# Patient Record
Sex: FEMALE | Race: WHITE | NOT HISPANIC OR LATINO | Employment: OTHER | URBAN - NONMETROPOLITAN AREA
[De-identification: names, ages, dates, MRNs, and addresses within clinical notes are randomized per-mention and may not be internally consistent; named-entity substitution may affect disease eponyms.]

---

## 2017-01-20 DIAGNOSIS — Z12.31 ENCOUNTER FOR SCREENING MAMMOGRAM FOR MALIGNANT NEOPLASM OF BREAST: Primary | ICD-10-CM

## 2017-02-02 ENCOUNTER — LAB (OUTPATIENT)
Dept: LAB | Facility: OTHER | Age: 62
End: 2017-02-02

## 2017-02-02 DIAGNOSIS — I10 ESSENTIAL HYPERTENSION: ICD-10-CM

## 2017-02-02 DIAGNOSIS — R73.01 IMPAIRED FASTING GLUCOSE: ICD-10-CM

## 2017-02-02 DIAGNOSIS — I10 ESSENTIAL HYPERTENSION: Primary | ICD-10-CM

## 2017-02-02 DIAGNOSIS — E78.5 HYPERLIPIDEMIA, UNSPECIFIED HYPERLIPIDEMIA TYPE: ICD-10-CM

## 2017-02-02 LAB
ALBUMIN SERPL-MCNC: 4.2 G/DL (ref 3.2–5.5)
ALBUMIN/GLOB SERPL: 1.9 G/DL (ref 1–3)
ALP SERPL-CCNC: 68 U/L (ref 15–121)
ALT SERPL W P-5'-P-CCNC: 12 U/L (ref 10–60)
ANION GAP SERPL CALCULATED.3IONS-SCNC: 4 MMOL/L (ref 5–15)
ARTICHOKE IGE QN: 85 MG/DL (ref 0–129)
AST SERPL-CCNC: 16 U/L (ref 10–60)
BASOPHILS # BLD AUTO: 0.02 10*3/MM3 (ref 0–0.2)
BASOPHILS NFR BLD AUTO: 0.4 % (ref 0–2)
BILIRUB SERPL-MCNC: 0.7 MG/DL (ref 0.2–1)
BUN BLD-MCNC: 19 MG/DL (ref 8–25)
BUN/CREAT SERPL: 23.8 (ref 7–25)
CALCIUM SPEC-SCNC: 9.9 MG/DL (ref 8.4–10.8)
CHLORIDE SERPL-SCNC: 105 MMOL/L (ref 100–112)
CO2 SERPL-SCNC: 33 MMOL/L (ref 20–32)
CREAT BLD-MCNC: 0.8 MG/DL (ref 0.4–1.3)
DEPRECATED RDW RBC AUTO: 41.5 FL (ref 36.4–46.3)
EOSINOPHIL # BLD AUTO: 0.13 10*3/MM3 (ref 0–0.7)
EOSINOPHIL NFR BLD AUTO: 2.4 % (ref 0–7)
ERYTHROCYTE [DISTWIDTH] IN BLOOD BY AUTOMATED COUNT: 13.4 % (ref 11.5–14.5)
GFR SERPL CREATININE-BSD FRML MDRD: 73 ML/MIN/1.73 (ref 45–104)
GLOBULIN UR ELPH-MCNC: 2.2 GM/DL (ref 2.5–4.6)
GLUCOSE BLD-MCNC: 98 MG/DL (ref 70–100)
HCT VFR BLD AUTO: 39.5 % (ref 35–45)
HGB BLD-MCNC: 12.8 G/DL (ref 12–15.5)
LYMPHOCYTES # BLD AUTO: 1.48 10*3/MM3 (ref 0.6–4.2)
LYMPHOCYTES NFR BLD AUTO: 27.9 % (ref 10–50)
MCH RBC QN AUTO: 28 PG (ref 26.5–34)
MCHC RBC AUTO-ENTMCNC: 32.4 G/DL (ref 31.4–36)
MCV RBC AUTO: 86.4 FL (ref 80–98)
MONOCYTES # BLD AUTO: 0.34 10*3/MM3 (ref 0–0.9)
MONOCYTES NFR BLD AUTO: 6.4 % (ref 0–12)
NEUTROPHILS # BLD AUTO: 3.34 10*3/MM3 (ref 2–8.6)
NEUTROPHILS NFR BLD AUTO: 62.9 % (ref 37–80)
PLATELET # BLD AUTO: 166 10*3/MM3 (ref 150–450)
PMV BLD AUTO: 12.1 FL (ref 8–12)
POTASSIUM BLD-SCNC: 4.7 MMOL/L (ref 3.4–5.4)
PROT SERPL-MCNC: 6.4 G/DL (ref 6.7–8.2)
RBC # BLD AUTO: 4.57 10*6/MM3 (ref 3.77–5.16)
SODIUM BLD-SCNC: 142 MMOL/L (ref 134–146)
WBC NRBC COR # BLD: 5.31 10*3/MM3 (ref 3.2–9.8)

## 2017-02-02 PROCEDURE — 83721 ASSAY OF BLOOD LIPOPROTEIN: CPT | Performed by: INTERNAL MEDICINE

## 2017-02-02 PROCEDURE — 83036 HEMOGLOBIN GLYCOSYLATED A1C: CPT | Performed by: INTERNAL MEDICINE

## 2017-02-02 PROCEDURE — 36415 COLL VENOUS BLD VENIPUNCTURE: CPT | Performed by: INTERNAL MEDICINE

## 2017-02-02 PROCEDURE — 80053 COMPREHEN METABOLIC PANEL: CPT | Performed by: INTERNAL MEDICINE

## 2017-02-02 PROCEDURE — 85025 COMPLETE CBC W/AUTO DIFF WBC: CPT | Performed by: INTERNAL MEDICINE

## 2017-02-03 LAB — HBA1C MFR BLD: 5.49 % (ref 4–5.6)

## 2017-02-09 ENCOUNTER — OFFICE VISIT (OUTPATIENT)
Dept: FAMILY MEDICINE CLINIC | Facility: CLINIC | Age: 62
End: 2017-02-09

## 2017-02-09 VITALS
BODY MASS INDEX: 33.57 KG/M2 | SYSTOLIC BLOOD PRESSURE: 130 MMHG | HEIGHT: 60 IN | TEMPERATURE: 97.3 F | DIASTOLIC BLOOD PRESSURE: 80 MMHG | WEIGHT: 171 LBS | HEART RATE: 68 BPM

## 2017-02-09 DIAGNOSIS — F02.818 EARLY ONSET ALZHEIMER'S DISEASE WITH BEHAVIORAL DISTURBANCE (HCC): ICD-10-CM

## 2017-02-09 DIAGNOSIS — R19.7 DIARRHEA, UNSPECIFIED TYPE: ICD-10-CM

## 2017-02-09 DIAGNOSIS — G30.0 EARLY ONSET ALZHEIMER'S DISEASE WITH BEHAVIORAL DISTURBANCE (HCC): ICD-10-CM

## 2017-02-09 DIAGNOSIS — I10 ESSENTIAL HYPERTENSION: ICD-10-CM

## 2017-02-09 DIAGNOSIS — E78.5 HYPERLIPIDEMIA, UNSPECIFIED HYPERLIPIDEMIA TYPE: Primary | ICD-10-CM

## 2017-02-09 DIAGNOSIS — D53.1 MEGALOBLASTIC ANEMIA DUE TO VITAMIN B12 DEFICIENCY: ICD-10-CM

## 2017-02-09 DIAGNOSIS — F32.A DEPRESSIVE DISORDER: ICD-10-CM

## 2017-02-09 DIAGNOSIS — R73.01 IMPAIRED FASTING GLUCOSE: ICD-10-CM

## 2017-02-09 PROCEDURE — 99214 OFFICE O/P EST MOD 30 MIN: CPT | Performed by: INTERNAL MEDICINE

## 2017-02-09 RX ORDER — DONEPEZIL HYDROCHLORIDE 5 MG/1
5 TABLET, FILM COATED ORAL NIGHTLY
Qty: 30 TABLET | Refills: 6 | Status: SHIPPED | OUTPATIENT
Start: 2017-02-09 | End: 2018-09-25

## 2017-02-09 NOTE — PROGRESS NOTES
Subjective     Andria Goldstein is a 61 y.o. female.     History of Present Illness   Andria Goldstein is here for 6-month follow up on hyperlipidemia, hypertension, cerebrovascular disease, apparent Alzheimer's dementia, chronic diarrhea of undetermined etiology, impaired fasting glucose and vitamin B-12 deficiency among other issues.      In Spring 2016, she was found to have positive C-diff and unexplained weight loss, for which she was seen by Ruben Lynn and underwent evaluation.  She was treated with Flagyl.  Symptoms seemed to improve.  Patient underwent colonoscopy on 06/20/16, which showed internal/external hemorrhoids and a diverticulum. Stool for C-diff was negative at that time. Repeat colonoscopy recommended in 5 years, 2021.  The weight loss and mental status changes resolved, but the patient reports she continues to have daily loose stool or diarrhea.  She estimates 5-8 bowel movements daily.  She reports this diarrhea has been present all of her adult life.  She had a cholecystectomy when her daughter was only 1 years old.  She reports Dr. Vargas tried cholestyramine, but it did not help.  I offered referral back to the GI service for further evaluation, but she declines.     We started Aricept 05/2016 for early onset of symptoms consistent with Alzheimer's disease. However, after her MMSE score improved to 29/30, she decided against the Aricept. She feels like her symptoms have improved/stablized.  Noncontrast CT scan of the head in 2016 was unremarkable.  The patient's daughter has noticed a decline in memory and related issues through the past several months.  Discussed this and restarted Aricept today.  I explained that Aricept can have GI side effects including diarrhea.  They will notify me if her diarrhea worsens.  I recommend a referral to Dr. Dee at [facility] for neuropsychiatric testing.    Her labs are all reviewed with results listed below.  Everything looks  "good.      Review of Systems   Constitutional: Negative for chills, fatigue and fever.   HENT: Negative for congestion, ear pain, postnasal drip, sinus pressure and sore throat.    Respiratory: Negative for cough, shortness of breath and wheezing.    Cardiovascular: Negative for chest pain, palpitations and leg swelling.   Gastrointestinal: Positive for diarrhea. Negative for abdominal pain, blood in stool, constipation, nausea and vomiting.   Endocrine: Negative for cold intolerance, heat intolerance, polydipsia and polyuria.   Genitourinary: Negative for dysuria, frequency, hematuria and urgency.   Skin: Negative for rash.   Neurological: Negative for syncope and weakness.        Memory loss   Psychiatric/Behavioral: Positive for decreased concentration.        Becomes irritable more easily.       Objective     Visit Vitals   • /80   • Pulse 68   • Temp 97.3 °F (36.3 °C) (Oral)   • Ht 60\" (152.4 cm)   • Wt 171 lb (77.6 kg)   • BMI 33.4 kg/m2       Physical Exam   Constitutional: She is oriented to person, place, and time. She appears well-developed and well-nourished. No distress.   Accompanied by her daughter.  Some mild cognitive deficits are noted.  3 minute recall is 2 of 3 items   HENT:   Nose: Right sinus exhibits no maxillary sinus tenderness and no frontal sinus tenderness. Left sinus exhibits no maxillary sinus tenderness and no frontal sinus tenderness.   Mouth/Throat: Uvula is midline, oropharynx is clear and moist and mucous membranes are normal. No oral lesions. No tonsillar exudate.   Eyes: Conjunctivae and EOM are normal. Pupils are equal, round, and reactive to light.   Neck: Trachea normal. Neck supple. No JVD present. Carotid bruit is not present. No tracheal deviation present. No thyroid mass and no thyromegaly present.   Cardiovascular: Normal rate, regular rhythm and normal heart sounds.   No extrasystoles are present. PMI is not displaced.    No murmur heard.  Pulmonary/Chest: Effort " normal and breath sounds normal. No accessory muscle usage. No respiratory distress. She has no decreased breath sounds. She has no wheezes. She has no rhonchi. She has no rales.   Abdominal: Soft. Bowel sounds are normal. She exhibits no distension. There is no hepatosplenomegaly. There is no tenderness.       Vascular Status -  Her exam exhibits right foot vasculature abnormal and no right foot edema. Her exam exhibits left foot vasculature abnormal and no left foot edema.  Lymphadenopathy:     She has no cervical adenopathy.   Neurological: She is alert and oriented to person, place, and time. No cranial nerve deficit. Coordination normal.   Skin: Skin is warm, dry and intact. No rash noted. No cyanosis. Nails show no clubbing.   Psychiatric: She has a normal mood and affect. Her speech is normal and behavior is normal.   Vitals reviewed.      Assessment/Plan   Restart Aricept 5 mg daily at bedtime and reevaluate in 6 weeks.  We will increase the Aricept at that time if that has not aggravated the diarrhea or produced other GI symptoms.  Repeat a MMSE with her visit in 6 weeks.  Refer to Dr. Dee for neuropsychiatric evaluation.  Continue the Celexa 20 mg daily.    Continue to monitor the diarrhea issues.  She denies fecal incontinence.  That her amount of loose stool/diarrhea is at baseline and has been present all of her adult life.  She declines further evaluation at this time.  She failed cholestyramine, which had been attempted by Dr. Vargas.  If her symptoms worsen we would want to repeat a stool for C. difficile toxin    I her other current medications.  I recommended calorie reduction and weight loss.    Continue the home exercise program for her arthritic knees and lumbar DJD.    I will see her back in 6 weeks as above and also back in 6 months with fasting labs prior.      Diagnoses and all orders for this visit:    Hyperlipidemia, unspecified hyperlipidemia type  -     Lipid Panel;  Future    Essential hypertension  -     CBC Auto Differential; Future  -     Comprehensive Metabolic Panel; Future    Impaired fasting glucose  -     Hemoglobin A1c; Future    Early onset Alzheimer's disease with behavioral disturbance    Megaloblastic anemia due to vitamin B12 deficiency  -     Vitamin B12; Future    Depressive disorder    Other orders  -     donepezil (ARICEPT) 5 MG tablet; Take 1 tablet by mouth Every Night.        Lab on 02/02/2017   Component Date Value Ref Range Status   • WBC 02/02/2017 5.31  3.20 - 9.80 10*3/mm3 Final   • RBC 02/02/2017 4.57  3.77 - 5.16 10*6/mm3 Final   • Hemoglobin 02/02/2017 12.8  12.0 - 15.5 g/dL Final   • Hematocrit 02/02/2017 39.5  35.0 - 45.0 % Final   • MCV 02/02/2017 86.4  80.0 - 98.0 fL Final   • MCH 02/02/2017 28.0  26.5 - 34.0 pg Final   • MCHC 02/02/2017 32.4  31.4 - 36.0 g/dL Final   • RDW 02/02/2017 13.4  11.5 - 14.5 % Final   • RDW-SD 02/02/2017 41.5  36.4 - 46.3 fl Final   • MPV 02/02/2017 12.1* 8.0 - 12.0 fL Final   • Platelets 02/02/2017 166  150 - 450 10*3/mm3 Final   • Neutrophil % 02/02/2017 62.9  37.0 - 80.0 % Final   • Lymphocyte % 02/02/2017 27.9  10.0 - 50.0 % Final   • Monocyte % 02/02/2017 6.4  0.0 - 12.0 % Final   • Eosinophil % 02/02/2017 2.4  0.0 - 7.0 % Final   • Basophil % 02/02/2017 0.4  0.0 - 2.0 % Final   • Neutrophils, Absolute 02/02/2017 3.34  2.00 - 8.60 10*3/mm3 Final   • Lymphocytes, Absolute 02/02/2017 1.48  0.60 - 4.20 10*3/mm3 Final   • Monocytes, Absolute 02/02/2017 0.34  0.00 - 0.90 10*3/mm3 Final   • Eosinophils, Absolute 02/02/2017 0.13  0.00 - 0.70 10*3/mm3 Final   • Basophils, Absolute 02/02/2017 0.02  0.00 - 0.20 10*3/mm3 Final   • LDL Cholesterol  02/02/2017 85  0 - 129 mg/dL Final   • Hemoglobin A1C 02/02/2017 5.49  4 - 5.6 % Final   • Glucose 02/02/2017 98  70 - 100 mg/dL Final   • BUN 02/02/2017 19  8 - 25 mg/dL Final   • Creatinine 02/02/2017 0.80  0.40 - 1.30 mg/dL Final   • Sodium 02/02/2017 142  134 - 146 mmol/L  Final   • Potassium 02/02/2017 4.7  3.4 - 5.4 mmol/L Final   • Chloride 02/02/2017 105  100 - 112 mmol/L Final   • CO2 02/02/2017 33.0* 20.0 - 32.0 mmol/L Final   • Calcium 02/02/2017 9.9  8.4 - 10.8 mg/dL Final   • Total Protein 02/02/2017 6.4* 6.7 - 8.2 g/dL Final   • Albumin 02/02/2017 4.20  3.20 - 5.50 g/dL Final   • ALT (SGPT) 02/02/2017 12  10 - 60 U/L Final   • AST (SGOT) 02/02/2017 16  10 - 60 U/L Final   • Alkaline Phosphatase 02/02/2017 68  15 - 121 U/L Final   • Total Bilirubin 02/02/2017 0.7  0.2 - 1.0 mg/dL Final   • eGFR Non African Amer 02/02/2017 73  45 - 104 mL/min/1.73 Final   • Globulin 02/02/2017 2.2* 2.5 - 4.6 gm/dL Final   • A/G Ratio 02/02/2017 1.9  1.0 - 3.0 g/dL Final   • BUN/Creatinine Ratio 02/02/2017 23.8  7.0 - 25.0 Final   • Anion Gap 02/02/2017 4.0* 5.0 - 15.0 mmol/L Final   ]

## 2017-02-21 RX ORDER — TIZANIDINE 4 MG/1
TABLET ORAL
Qty: 30 TABLET | Refills: 2 | Status: SHIPPED | OUTPATIENT
Start: 2017-02-21 | End: 2017-05-30 | Stop reason: SDUPTHER

## 2017-03-07 ENCOUNTER — OFFICE VISIT (OUTPATIENT)
Dept: FAMILY MEDICINE CLINIC | Facility: CLINIC | Age: 62
End: 2017-03-07

## 2017-03-07 VITALS
HEIGHT: 60 IN | TEMPERATURE: 97.6 F | BODY MASS INDEX: 33.85 KG/M2 | SYSTOLIC BLOOD PRESSURE: 120 MMHG | HEART RATE: 60 BPM | DIASTOLIC BLOOD PRESSURE: 70 MMHG | WEIGHT: 172.4 LBS

## 2017-03-07 DIAGNOSIS — I10 ESSENTIAL HYPERTENSION: Primary | ICD-10-CM

## 2017-03-07 DIAGNOSIS — G47.62 NOCTURNAL LEG CRAMPS: ICD-10-CM

## 2017-03-07 DIAGNOSIS — R51.9 HEADACHE, UNSPECIFIED HEADACHE TYPE: ICD-10-CM

## 2017-03-07 PROCEDURE — 99213 OFFICE O/P EST LOW 20 MIN: CPT | Performed by: INTERNAL MEDICINE

## 2017-03-07 RX ORDER — LISINOPRIL 10 MG/1
10 TABLET ORAL 2 TIMES DAILY
Qty: 60 TABLET | Refills: 11 | Status: SHIPPED | OUTPATIENT
Start: 2017-03-07 | End: 2017-03-23

## 2017-03-07 NOTE — PROGRESS NOTES
"Subjective     History of Present Illness     Andria Goldstein is a 61 y.o. female here reporting elevated blood pressure with accompanying constant headache on her current lisinopril 10 mg daily.  She brings in a blood pressure diary with systolic range 140-160 with elevations approximately 50% of the time.  She has been taking about six Excedrin daily.  I recommended she add Tylenol to the Excedrin for the headaches.  I am going to increase her lisinopril to twice daily dosing, but discussed the need to monitor for orthostatic symptoms.      She has been having some nighttime muscle cramps in the lower extremities for the past few nights.  Review of her labs last month reveal normal electrolytes.  I recommended she hold her Zocor for a week.      She is also reporting bilateral earache, right greater than left.  Exam of the ear reveals no abnormality.  We discussed symptoms could be related to a viral illness.      Blood pressure 120/70, pulse 60, temperature 97.6 °F (36.4 °C), temperature source Oral, height 60\" (152.4 cm), weight 172 lb 6.4 oz (78.2 kg).       Review of Systems   Constitutional: Negative for chills, fatigue and fever.   HENT: Negative for congestion, ear pain, postnasal drip, sinus pressure and sore throat.    Respiratory: Negative for cough, shortness of breath and wheezing.    Cardiovascular: Negative for chest pain, palpitations and leg swelling.   Gastrointestinal: Negative for abdominal pain, blood in stool, constipation, diarrhea, nausea and vomiting.   Endocrine: Negative for cold intolerance, heat intolerance, polydipsia and polyuria.   Genitourinary: Negative for dysuria, frequency, hematuria and urgency.   Musculoskeletal: Positive for myalgias.   Skin: Negative for rash.   Neurological: Positive for headaches. Negative for syncope and weakness.        Objective     Physical Exam   Constitutional: She is oriented to person, place, and time. She appears well-developed and " well-nourished. No distress.   HENT:   Head: Normocephalic and atraumatic.   Right Ear: External ear normal.   Left Ear: External ear normal.   Nose: Right sinus exhibits no maxillary sinus tenderness and no frontal sinus tenderness. Left sinus exhibits no maxillary sinus tenderness and no frontal sinus tenderness.   Mouth/Throat: Uvula is midline, oropharynx is clear and moist and mucous membranes are normal. No oral lesions. No tonsillar exudate.   Eyes: Conjunctivae and EOM are normal. Pupils are equal, round, and reactive to light.   Neck: Trachea normal. Neck supple. No JVD present. Carotid bruit is not present. No tracheal deviation present. No thyroid mass and no thyromegaly present.   Cardiovascular: Normal rate, regular rhythm and normal heart sounds.   No extrasystoles are present. PMI is not displaced.    No murmur heard.  Pulmonary/Chest: Effort normal and breath sounds normal. No accessory muscle usage. No respiratory distress. She has no decreased breath sounds. She has no wheezes. She has no rhonchi. She has no rales.   Abdominal: Soft. Bowel sounds are normal. She exhibits no distension. There is no hepatosplenomegaly. There is no tenderness.       Vascular Status -  Her exam exhibits right foot vasculature normal. Her exam exhibits no right foot edema. Her exam exhibits left foot vasculature normal. Her exam exhibits no left foot edema.  Lymphadenopathy:     She has no cervical adenopathy.   Neurological: She is alert and oriented to person, place, and time. No cranial nerve deficit. Coordination normal.   Skin: Skin is warm, dry and intact. No rash noted. No cyanosis. Nails show no clubbing.   Psychiatric: She has a normal mood and affect. Her speech is normal and behavior is normal. Thought content normal.   Vitals reviewed.     Future Appointments  Date Time Provider Department Center   3/23/2017 9:45 AM Christofer Mckeon MD [facility] None   8/9/2017 10:00 AM Christofer Mckeon MD [facility] None        Assessment/Plan      Increase lisinopril to 10 mg b.i.d.  Continue to monitor blood pressure.  She may have to hold the second dose if she experiences orthostatic symptoms or blood pressure below goal.      Continue with the Excedrin and add Tylenol for breakthrough pain.       She will hold the Zocor for a week and restart.  Notify me if the muscle cramps do not resolve.      Scribed for Dr. Mckeon by Cesilia Gutierrez [facility].     Diagnoses and all orders for this visit:    Essential hypertension    Headache, unspecified headache type    Nocturnal leg cramps    Other orders  -     lisinopril (PRINIVIL,ZESTRIL) 10 MG tablet; Take 1 tablet by mouth 2 (Two) Times a Day.      No visits with results within 3 Week(s) from this visit.  Latest known visit with results is:    Lab on 02/02/2017   Component Date Value Ref Range Status   • WBC 02/02/2017 5.31  3.20 - 9.80 10*3/mm3 Final   • RBC 02/02/2017 4.57  3.77 - 5.16 10*6/mm3 Final   • Hemoglobin 02/02/2017 12.8  12.0 - 15.5 g/dL Final   • Hematocrit 02/02/2017 39.5  35.0 - 45.0 % Final   • MCV 02/02/2017 86.4  80.0 - 98.0 fL Final   • MCH 02/02/2017 28.0  26.5 - 34.0 pg Final   • MCHC 02/02/2017 32.4  31.4 - 36.0 g/dL Final   • RDW 02/02/2017 13.4  11.5 - 14.5 % Final   • RDW-SD 02/02/2017 41.5  36.4 - 46.3 fl Final   • MPV 02/02/2017 12.1* 8.0 - 12.0 fL Final   • Platelets 02/02/2017 166  150 - 450 10*3/mm3 Final   • Neutrophil % 02/02/2017 62.9  37.0 - 80.0 % Final   • Lymphocyte % 02/02/2017 27.9  10.0 - 50.0 % Final   • Monocyte % 02/02/2017 6.4  0.0 - 12.0 % Final   • Eosinophil % 02/02/2017 2.4  0.0 - 7.0 % Final   • Basophil % 02/02/2017 0.4  0.0 - 2.0 % Final   • Neutrophils, Absolute 02/02/2017 3.34  2.00 - 8.60 10*3/mm3 Final   • Lymphocytes, Absolute 02/02/2017 1.48  0.60 - 4.20 10*3/mm3 Final   • Monocytes, Absolute 02/02/2017 0.34  0.00 - 0.90 10*3/mm3 Final   • Eosinophils, Absolute 02/02/2017 0.13  0.00 - 0.70 10*3/mm3 Final   • Basophils, Absolute  02/02/2017 0.02  0.00 - 0.20 10*3/mm3 Final   • LDL Cholesterol  02/02/2017 85  0 - 129 mg/dL Final   • Hemoglobin A1C 02/02/2017 5.49  4 - 5.6 % Final   • Glucose 02/02/2017 98  70 - 100 mg/dL Final   • BUN 02/02/2017 19  8 - 25 mg/dL Final   • Creatinine 02/02/2017 0.80  0.40 - 1.30 mg/dL Final   • Sodium 02/02/2017 142  134 - 146 mmol/L Final   • Potassium 02/02/2017 4.7  3.4 - 5.4 mmol/L Final   • Chloride 02/02/2017 105  100 - 112 mmol/L Final   • CO2 02/02/2017 33.0* 20.0 - 32.0 mmol/L Final   • Calcium 02/02/2017 9.9  8.4 - 10.8 mg/dL Final   • Total Protein 02/02/2017 6.4* 6.7 - 8.2 g/dL Final   • Albumin 02/02/2017 4.20  3.20 - 5.50 g/dL Final   • ALT (SGPT) 02/02/2017 12  10 - 60 U/L Final   • AST (SGOT) 02/02/2017 16  10 - 60 U/L Final   • Alkaline Phosphatase 02/02/2017 68  15 - 121 U/L Final   • Total Bilirubin 02/02/2017 0.7  0.2 - 1.0 mg/dL Final   • eGFR Non African Amer 02/02/2017 73  45 - 104 mL/min/1.73 Final   • Globulin 02/02/2017 2.2* 2.5 - 4.6 gm/dL Final   • A/G Ratio 02/02/2017 1.9  1.0 - 3.0 g/dL Final   • BUN/Creatinine Ratio 02/02/2017 23.8  7.0 - 25.0 Final   • Anion Gap 02/02/2017 4.0* 5.0 - 15.0 mmol/L Final   ]

## 2017-03-23 ENCOUNTER — OFFICE VISIT (OUTPATIENT)
Dept: FAMILY MEDICINE CLINIC | Facility: CLINIC | Age: 62
End: 2017-03-23

## 2017-03-23 VITALS
WEIGHT: 175.4 LBS | SYSTOLIC BLOOD PRESSURE: 160 MMHG | HEART RATE: 52 BPM | DIASTOLIC BLOOD PRESSURE: 90 MMHG | BODY MASS INDEX: 34.44 KG/M2 | TEMPERATURE: 97.3 F | HEIGHT: 60 IN

## 2017-03-23 DIAGNOSIS — F02.80 EARLY ONSET ALZHEIMER'S DEMENTIA WITHOUT BEHAVIORAL DISTURBANCE (HCC): ICD-10-CM

## 2017-03-23 DIAGNOSIS — I10 ESSENTIAL HYPERTENSION: Primary | ICD-10-CM

## 2017-03-23 DIAGNOSIS — R19.7 DIARRHEA, UNSPECIFIED TYPE: ICD-10-CM

## 2017-03-23 DIAGNOSIS — G30.0 EARLY ONSET ALZHEIMER'S DEMENTIA WITHOUT BEHAVIORAL DISTURBANCE (HCC): ICD-10-CM

## 2017-03-23 DIAGNOSIS — G47.62 NOCTURNAL LEG CRAMPS: ICD-10-CM

## 2017-03-23 PROCEDURE — 99214 OFFICE O/P EST MOD 30 MIN: CPT | Performed by: INTERNAL MEDICINE

## 2017-03-23 RX ORDER — LISINOPRIL AND HYDROCHLOROTHIAZIDE 20; 12.5 MG/1; MG/1
1 TABLET ORAL EVERY MORNING
Qty: 30 TABLET | Refills: 11 | Status: SHIPPED | OUTPATIENT
Start: 2017-03-23 | End: 2018-04-05 | Stop reason: SDUPTHER

## 2017-03-23 RX ORDER — LISINOPRIL 10 MG/1
10 TABLET ORAL 2 TIMES DAILY
COMMUNITY
End: 2017-03-23 | Stop reason: DRUGHIGH

## 2017-03-23 NOTE — PROGRESS NOTES
Subjective     Andria Goldstein is a 61 y.o. female.     History of Present Illness   Andria is here for follow-up of hypertension, dementia, chronic diarrhea, and nocturnal leg cramps.    I increased her lisinopril to 20 mg daily with her last visit, but blood pressure is still above goal today.  She reports compliance with the lisinopril.  I recommend changing to lisinopril HCT.  The review of her blood pressure diary reveals systolics usually in the 150s.    She continues Aricept 5 mg daily at bedtime.  Her MMSE score today is 26/30, which is decreased from prior score of 29/30.  She scored poorly last year when she was struggling a great deal with anxiety, but then her score improved as we treated her anxiety and depression symptoms.  She has an appointment with Dr. Dee for neuropsychiatric testing in May 2017 at [facility].  I had hoped to increase her Aricept and/or start Namenda today.  She is still reporting experiencing chronic diarrhea.  She reports at least 4 loose stools or diarrhea daily.  See prior notes.  She has undergone prior GI workup.  C. difficile toxin was positive on one occasion and treated with Flagyl, but negative with colonoscopy.  She denies any strong odor to her stool.  She does not feel that the low-dose Aricept further aggravated her chronic diarrhea.  The patient is demonstrating cognitive slowing today and I don't believe she can comprehend the necessary instructions to start Namenda today.  We have called her daughter and discussed today's changes.  We have asked her to come with her mother with her next appointment in 1 month.  I hope to start Namenda at that time.    She reports that the recent struggle with nocturnal leg cramps has essentially resolved.  We had her take a short break from statin therapy, but she is back on her simvastatin now.      Review of Systems   Constitutional: Negative for chills, fatigue and fever.   HENT: Negative for congestion, ear pain,  "postnasal drip, sinus pressure and sore throat.    Respiratory: Negative for cough, shortness of breath and wheezing.    Cardiovascular: Negative for chest pain, palpitations and leg swelling.   Gastrointestinal: Negative for abdominal pain, blood in stool, constipation, diarrhea, nausea and vomiting.   Endocrine: Negative for cold intolerance, heat intolerance, polydipsia and polyuria.   Genitourinary: Negative for dysuria, frequency, hematuria and urgency.   Skin: Negative for rash.   Neurological: Negative for syncope and weakness.   Psychiatric/Behavioral: Positive for decreased concentration. Negative for suicidal ideas. The patient is not nervous/anxious.        Objective     /90  Pulse 52  Temp 97.3 °F (36.3 °C) (Oral)   Ht 60\" (152.4 cm)  Wt 175 lb 6.4 oz (79.6 kg)  BMI 34.26 kg/m2    Physical Exam   Constitutional: She is oriented to person, place, and time. She appears well-developed and well-nourished. No distress.   Dementia without agitation.   HENT:   Head: Normocephalic and atraumatic.   Right Ear: External ear normal.   Left Ear: External ear normal.   Nose: Right sinus exhibits no maxillary sinus tenderness and no frontal sinus tenderness. Left sinus exhibits no maxillary sinus tenderness and no frontal sinus tenderness.   Mouth/Throat: Uvula is midline, oropharynx is clear and moist and mucous membranes are normal. No oral lesions. No tonsillar exudate.   Eyes: Conjunctivae and EOM are normal. Pupils are equal, round, and reactive to light.   Neck: Trachea normal. Neck supple. No JVD present. Carotid bruit is not present. No tracheal deviation present. No thyroid mass and no thyromegaly present.   Cardiovascular: Normal rate, regular rhythm and normal heart sounds.   No extrasystoles are present. PMI is not displaced.    No murmur heard.  Pulmonary/Chest: Effort normal and breath sounds normal. No accessory muscle usage. No respiratory distress. She has no decreased breath sounds. She " has no wheezes. She has no rhonchi. She has no rales.   Abdominal: Soft. Bowel sounds are normal. She exhibits no distension. There is no hepatosplenomegaly. There is no tenderness.   Obese abdomen       Vascular Status -  Her exam exhibits right foot vasculature normal. Her exam exhibits no right foot edema. Her exam exhibits left foot vasculature normal. Her exam exhibits no left foot edema.  Lymphadenopathy:     She has no cervical adenopathy.   Neurological: She is alert and oriented to person, place, and time. No cranial nerve deficit. Coordination normal.   MMSE score is 26/30.  Responses to questions are slow and uncertain.   Skin: Skin is warm, dry and intact. No rash noted. No cyanosis. Nails show no clubbing.   Psychiatric: She has a normal mood and affect. Her speech is normal and behavior is normal. Thought content normal.   Vitals reviewed.      PHQ-9 Depression Screening 3/23/2017   Little interest or pleasure in doing things 2   Feeling down, depressed, or hopeless 2   Trouble falling or staying asleep, or sleeping too much 0   Feeling tired or having little energy 2   Poor appetite or overeating 3   Feeling bad about yourself - or that you are a failure or have let yourself or your family down 2   Trouble concentrating on things, such as reading the newspaper or watching television 3   Moving or speaking so slowly that other people could have noticed. Or the opposite - being so fidgety or restless that you have been moving around a lot more than usual 2   Thoughts that you would be better off dead, or of hurting yourself in some way 0   PHQ-9 Total Score 16   If you checked off any problems, how difficult have these problems made it for you to do your work, take care of things at home, or get along with other people? Somewhat difficult       Assessment/Plan   Change lisinopril to lisinopril HCT 20/12.5 every morning.  And I monitoring blood pressure at home and keep a blood pressure diary.  Bring  that in with next appointment.    Continue Aricept 5 mg daily at bedtime for now.  He did not increase the Aricept due to her chronic diarrhea, which she feels is not any worse since we started Aricept.  Keep the referral with Dr. Dee at [facility] in May.  Return in 1 month a family member and we will likely start Namenda.    Her score on depression screening is elevated despite Celexa.  I may want to intensify antidepressant therapy soon, but would like to hear input from family.    The chronic diarrhea has been a problem since gallbladder was removed by Dr. Vargas many years ago.  She reports that he tried cholestyramine without any improvement.  She declines a repeat evaluation by the GI service, as she had extensive evaluation by them last year.    Return to clinic in 1 month to reevaluate these issues.    Diagnoses and all orders for this visit:    Essential hypertension    Diarrhea, unspecified type    Early onset Alzheimer's dementia without behavioral disturbance    Other orders  -     Discontinue: lisinopril (PRINIVIL,ZESTRIL) 10 MG tablet; Take 10 mg by mouth 2 (Two) Times a Day.  -     lisinopril-hydrochlorothiazide (PRINZIDE,ZESTORETIC) 20-12.5 MG per tablet; Take 1 tablet by mouth Every Morning. For blood pressure.        No visits with results within 3 Week(s) from this visit.  Latest known visit with results is:    Lab on 02/02/2017   Component Date Value Ref Range Status   • WBC 02/02/2017 5.31  3.20 - 9.80 10*3/mm3 Final   • RBC 02/02/2017 4.57  3.77 - 5.16 10*6/mm3 Final   • Hemoglobin 02/02/2017 12.8  12.0 - 15.5 g/dL Final   • Hematocrit 02/02/2017 39.5  35.0 - 45.0 % Final   • MCV 02/02/2017 86.4  80.0 - 98.0 fL Final   • MCH 02/02/2017 28.0  26.5 - 34.0 pg Final   • MCHC 02/02/2017 32.4  31.4 - 36.0 g/dL Final   • RDW 02/02/2017 13.4  11.5 - 14.5 % Final   • RDW-SD 02/02/2017 41.5  36.4 - 46.3 fl Final   • MPV 02/02/2017 12.1* 8.0 - 12.0 fL Final   • Platelets 02/02/2017 166  150 -  450 10*3/mm3 Final   • Neutrophil % 02/02/2017 62.9  37.0 - 80.0 % Final   • Lymphocyte % 02/02/2017 27.9  10.0 - 50.0 % Final   • Monocyte % 02/02/2017 6.4  0.0 - 12.0 % Final   • Eosinophil % 02/02/2017 2.4  0.0 - 7.0 % Final   • Basophil % 02/02/2017 0.4  0.0 - 2.0 % Final   • Neutrophils, Absolute 02/02/2017 3.34  2.00 - 8.60 10*3/mm3 Final   • Lymphocytes, Absolute 02/02/2017 1.48  0.60 - 4.20 10*3/mm3 Final   • Monocytes, Absolute 02/02/2017 0.34  0.00 - 0.90 10*3/mm3 Final   • Eosinophils, Absolute 02/02/2017 0.13  0.00 - 0.70 10*3/mm3 Final   • Basophils, Absolute 02/02/2017 0.02  0.00 - 0.20 10*3/mm3 Final   • LDL Cholesterol  02/02/2017 85  0 - 129 mg/dL Final   • Hemoglobin A1C 02/02/2017 5.49  4 - 5.6 % Final   • Glucose 02/02/2017 98  70 - 100 mg/dL Final   • BUN 02/02/2017 19  8 - 25 mg/dL Final   • Creatinine 02/02/2017 0.80  0.40 - 1.30 mg/dL Final   • Sodium 02/02/2017 142  134 - 146 mmol/L Final   • Potassium 02/02/2017 4.7  3.4 - 5.4 mmol/L Final   • Chloride 02/02/2017 105  100 - 112 mmol/L Final   • CO2 02/02/2017 33.0* 20.0 - 32.0 mmol/L Final   • Calcium 02/02/2017 9.9  8.4 - 10.8 mg/dL Final   • Total Protein 02/02/2017 6.4* 6.7 - 8.2 g/dL Final   • Albumin 02/02/2017 4.20  3.20 - 5.50 g/dL Final   • ALT (SGPT) 02/02/2017 12  10 - 60 U/L Final   • AST (SGOT) 02/02/2017 16  10 - 60 U/L Final   • Alkaline Phosphatase 02/02/2017 68  15 - 121 U/L Final   • Total Bilirubin 02/02/2017 0.7  0.2 - 1.0 mg/dL Final   • eGFR Non African Amer 02/02/2017 73  45 - 104 mL/min/1.73 Final   • Globulin 02/02/2017 2.2* 2.5 - 4.6 gm/dL Final   • A/G Ratio 02/02/2017 1.9  1.0 - 3.0 g/dL Final   • BUN/Creatinine Ratio 02/02/2017 23.8  7.0 - 25.0 Final   • Anion Gap 02/02/2017 4.0* 5.0 - 15.0 mmol/L Final   ]

## 2017-04-18 RX ORDER — OMEPRAZOLE 40 MG/1
CAPSULE, DELAYED RELEASE ORAL
Qty: 30 CAPSULE | Refills: 12 | Status: SHIPPED | OUTPATIENT
Start: 2017-04-18 | End: 2018-04-23 | Stop reason: SDUPTHER

## 2017-04-26 ENCOUNTER — OFFICE VISIT (OUTPATIENT)
Dept: FAMILY MEDICINE CLINIC | Facility: CLINIC | Age: 62
End: 2017-04-26

## 2017-04-26 VITALS
DIASTOLIC BLOOD PRESSURE: 68 MMHG | BODY MASS INDEX: 34.08 KG/M2 | HEIGHT: 60 IN | HEART RATE: 64 BPM | TEMPERATURE: 97.7 F | SYSTOLIC BLOOD PRESSURE: 110 MMHG | WEIGHT: 173.6 LBS

## 2017-04-26 DIAGNOSIS — K91.89 POSTCHOLECYSTECTOMY DIARRHEA: ICD-10-CM

## 2017-04-26 DIAGNOSIS — R19.7 DIARRHEA, UNSPECIFIED TYPE: ICD-10-CM

## 2017-04-26 DIAGNOSIS — F41.1 GENERALIZED ANXIETY DISORDER: ICD-10-CM

## 2017-04-26 DIAGNOSIS — F02.818 EARLY ONSET ALZHEIMER'S DISEASE WITH BEHAVIORAL DISTURBANCE (HCC): Primary | ICD-10-CM

## 2017-04-26 DIAGNOSIS — R19.7 POSTCHOLECYSTECTOMY DIARRHEA: ICD-10-CM

## 2017-04-26 DIAGNOSIS — A04.72 CLOSTRIDIUM DIFFICILE DIARRHEA: ICD-10-CM

## 2017-04-26 DIAGNOSIS — F33.41 RECURRENT MAJOR DEPRESSIVE DISORDER, IN PARTIAL REMISSION (HCC): ICD-10-CM

## 2017-04-26 DIAGNOSIS — G30.0 EARLY ONSET ALZHEIMER'S DISEASE WITH BEHAVIORAL DISTURBANCE (HCC): Primary | ICD-10-CM

## 2017-04-26 DIAGNOSIS — M65.311 TRIGGER FINGER OF RIGHT THUMB: ICD-10-CM

## 2017-04-26 PROCEDURE — 99214 OFFICE O/P EST MOD 30 MIN: CPT | Performed by: INTERNAL MEDICINE

## 2017-04-26 RX ORDER — MEMANTINE HYDROCHLORIDE 10 MG/1
10 TABLET ORAL 2 TIMES DAILY
Qty: 60 TABLET | Refills: 11 | Status: SHIPPED | OUTPATIENT
Start: 2017-04-26 | End: 2017-10-18 | Stop reason: SDUPTHER

## 2017-04-26 NOTE — PROGRESS NOTES
Subjective     Andria Goldstein is a 62 y.o. female.     History of Present Illness   Andria is here for one-month follow-up of multiple issues.    Her dementia symptoms and short-term memory worsened this year.  See last note for more details.  He continues to take Aricept 5 mg daily at bedtime.  She struggles with chronic diarrhea, but feels that the current dose of Aricept has not worsened those symptoms.  Her most recent MMSE score was 26/30.  Her sister is with her today.  We have described how to start Namenda and titrate upward over the next 3 weeks.  They voice understanding.  I provided written instructions.  She will have extensive neuropsychiatric evaluation by Dr. Dee in [place] next month.    See last notes for more details on her chronic diarrhea of undetermined etiology.  At one time, she tested positive for Clostridium difficile and was treated with Flagyl with apparent temporary improvement of her symptoms.  She declines repeat evaluation by the GI service at this time.  I recommend checking a C. difficile toxin as she leaves today.  She averages for loose diarrhea stools daily.  She sometimes expresses some fecal incontinence due to the diarrhea.  She denies abdominal pain or any fevers or chills.  She denies any blood or mucus in the stool.  She reports that her diarrhea symptoms started after cholecystectomy performed by Dr. Vargas.  She reports that Dr. Vargas tried cholestyramine without any improvement in symptoms, but I'm considering trying that again if she would be willing.    She is experiencing spring allergy symptoms including rhinitis, itching, and sneezing.  I recommended that she start Claritin.    She fell 2 weeks ago and injured her right thumb.  Exam reveals evidence of a trigger thumb on the right.      Review of Systems   Constitutional: Negative for chills, fatigue and fever.   HENT: Positive for postnasal drip, rhinorrhea and sneezing. Negative for  "congestion, ear pain, sinus pressure and sore throat.    Respiratory: Negative for cough, shortness of breath and wheezing.    Cardiovascular: Negative for chest pain, palpitations and leg swelling.   Gastrointestinal: Negative for abdominal pain, blood in stool, constipation, diarrhea, nausea and vomiting.   Endocrine: Negative for cold intolerance, heat intolerance, polydipsia and polyuria.   Genitourinary: Negative for dysuria, frequency, hematuria and urgency.   Skin: Negative for rash.   Neurological: Negative for syncope and weakness.   Psychiatric/Behavioral: Positive for decreased concentration. Negative for suicidal ideas. The patient is not nervous/anxious.        Objective     /68  Pulse 64  Temp 97.7 °F (36.5 °C) (Oral)   Ht 60\" (152.4 cm)  Wt 173 lb 9.6 oz (78.7 kg)  BMI 33.9 kg/m2    Physical Exam   Constitutional: She is oriented to person, place, and time. She appears well-developed and well-nourished. No distress.   Accompanied by her sister today.  Dementia without agitation.   HENT:   Head: Normocephalic and atraumatic.   Right Ear: External ear normal.   Left Ear: External ear normal.   Nose: Right sinus exhibits no maxillary sinus tenderness and no frontal sinus tenderness. Left sinus exhibits no maxillary sinus tenderness and no frontal sinus tenderness.   Mouth/Throat: Uvula is midline, oropharynx is clear and moist and mucous membranes are normal. No oral lesions. No tonsillar exudate.   Eyes: Conjunctivae and EOM are normal. Pupils are equal, round, and reactive to light.   Neck: Trachea normal. Neck supple. No JVD present. Carotid bruit is not present. No tracheal deviation present. No thyroid mass and no thyromegaly present.   Cardiovascular: Normal rate, regular rhythm and normal heart sounds.   No extrasystoles are present. PMI is not displaced.    No murmur heard.  Pulmonary/Chest: Effort normal and breath sounds normal. No accessory muscle usage. No respiratory distress. She " has no decreased breath sounds. She has no wheezes. She has no rhonchi. She has no rales.   Abdominal: Soft. Bowel sounds are normal. She exhibits no distension. There is no hepatosplenomegaly. There is no tenderness.   Obese abdomen   Musculoskeletal:   Exam of the right thumb reveals trigger finger       Vascular Status -  Her exam exhibits right foot vasculature normal. Her exam exhibits no right foot edema. Her exam exhibits left foot vasculature normal. Her exam exhibits no left foot edema.  Lymphadenopathy:     She has no cervical adenopathy.   Neurological: She is alert and oriented to person, place, and time. No cranial nerve deficit. Coordination normal.   Skin: Skin is warm, dry and intact. No rash noted. No cyanosis. Nails show no clubbing.   Psychiatric: She has a normal mood and affect. Her speech is normal and behavior is normal. Thought content normal.   Vitals reviewed.      PHQ-9 Depression Screening 3/23/2017   Little interest or pleasure in doing things 2   Feeling down, depressed, or hopeless 2   Trouble falling or staying asleep, or sleeping too much 0   Feeling tired or having little energy 2   Poor appetite or overeating 3   Feeling bad about yourself - or that you are a failure or have let yourself or your family down 2   Trouble concentrating on things, such as reading the newspaper or watching television 3   Moving or speaking so slowly that other people could have noticed. Or the opposite - being so fidgety or restless that you have been moving around a lot more than usual 2   Thoughts that you would be better off dead, or of hurting yourself in some way 0   PHQ-9 Total Score 16   If you checked off any problems, how difficult have these problems made it for you to do your work, take care of things at home, or get along with other people? Somewhat difficult       Assessment/Plan   Continue Aricept 5 mg daily at bedtime.  I'm reluctant to increase Aricept until her diarrhea improves.  Start  Namenda, gradually working up to 10 mg twice a day.  Keep the appointment with Dr. Dee next month.  Notify me of any tolerability issues.  Continue current precautions.    Collect a C. difficile toxin.  I want to consider a repeat trial of cholestyramine in the future if the patient would be willing.  I will be happy to refer her back to the GI service again when/if she is willing.    Use Claritin 10 mg daily when necessary.    We will give the trigger finger of the right thumb a few weeks to see if it improves without intervention.  Notify me if it does not.    Continue the current dose of Celexa.  It is possible that she would benefit from stronger antidepressant therapy, but he seems to be doing fairly well currently.  Her depression screening was rather high at 16.    Return to clinic this summer as previously scheduled, sooner if needed.          Diagnoses and all orders for this visit:    Early onset Alzheimer's disease with behavioral disturbance    Diarrhea, unspecified type  -     Clostridium Difficile Toxin; Future    Generalized anxiety disorder    Postcholecystectomy diarrhea    Clostridium difficile diarrhea- h/o    Trigger finger of right thumb    Recurrent major depressive disorder, in partial remission    Other orders  -     memantine (NAMENDA) 10 MG tablet; Take 1 tablet by mouth 2 (Two) Times a Day.        No visits with results within 3 Week(s) from this visit.  Latest known visit with results is:    Lab on 02/02/2017   Component Date Value Ref Range Status   • WBC 02/02/2017 5.31  3.20 - 9.80 10*3/mm3 Final   • RBC 02/02/2017 4.57  3.77 - 5.16 10*6/mm3 Final   • Hemoglobin 02/02/2017 12.8  12.0 - 15.5 g/dL Final   • Hematocrit 02/02/2017 39.5  35.0 - 45.0 % Final   • MCV 02/02/2017 86.4  80.0 - 98.0 fL Final   • MCH 02/02/2017 28.0  26.5 - 34.0 pg Final   • MCHC 02/02/2017 32.4  31.4 - 36.0 g/dL Final   • RDW 02/02/2017 13.4  11.5 - 14.5 % Final   • RDW-SD 02/02/2017 41.5  36.4 - 46.3 fl  Final   • MPV 02/02/2017 12.1* 8.0 - 12.0 fL Final   • Platelets 02/02/2017 166  150 - 450 10*3/mm3 Final   • Neutrophil % 02/02/2017 62.9  37.0 - 80.0 % Final   • Lymphocyte % 02/02/2017 27.9  10.0 - 50.0 % Final   • Monocyte % 02/02/2017 6.4  0.0 - 12.0 % Final   • Eosinophil % 02/02/2017 2.4  0.0 - 7.0 % Final   • Basophil % 02/02/2017 0.4  0.0 - 2.0 % Final   • Neutrophils, Absolute 02/02/2017 3.34  2.00 - 8.60 10*3/mm3 Final   • Lymphocytes, Absolute 02/02/2017 1.48  0.60 - 4.20 10*3/mm3 Final   • Monocytes, Absolute 02/02/2017 0.34  0.00 - 0.90 10*3/mm3 Final   • Eosinophils, Absolute 02/02/2017 0.13  0.00 - 0.70 10*3/mm3 Final   • Basophils, Absolute 02/02/2017 0.02  0.00 - 0.20 10*3/mm3 Final   • LDL Cholesterol  02/02/2017 85  0 - 129 mg/dL Final   • Hemoglobin A1C 02/02/2017 5.49  4 - 5.6 % Final   • Glucose 02/02/2017 98  70 - 100 mg/dL Final   • BUN 02/02/2017 19  8 - 25 mg/dL Final   • Creatinine 02/02/2017 0.80  0.40 - 1.30 mg/dL Final   • Sodium 02/02/2017 142  134 - 146 mmol/L Final   • Potassium 02/02/2017 4.7  3.4 - 5.4 mmol/L Final   • Chloride 02/02/2017 105  100 - 112 mmol/L Final   • CO2 02/02/2017 33.0* 20.0 - 32.0 mmol/L Final   • Calcium 02/02/2017 9.9  8.4 - 10.8 mg/dL Final   • Total Protein 02/02/2017 6.4* 6.7 - 8.2 g/dL Final   • Albumin 02/02/2017 4.20  3.20 - 5.50 g/dL Final   • ALT (SGPT) 02/02/2017 12  10 - 60 U/L Final   • AST (SGOT) 02/02/2017 16  10 - 60 U/L Final   • Alkaline Phosphatase 02/02/2017 68  15 - 121 U/L Final   • Total Bilirubin 02/02/2017 0.7  0.2 - 1.0 mg/dL Final   • eGFR Non African Amer 02/02/2017 73  45 - 104 mL/min/1.73 Final   • Globulin 02/02/2017 2.2* 2.5 - 4.6 gm/dL Final   • A/G Ratio 02/02/2017 1.9  1.0 - 3.0 g/dL Final   • BUN/Creatinine Ratio 02/02/2017 23.8  7.0 - 25.0 Final   • Anion Gap 02/02/2017 4.0* 5.0 - 15.0 mmol/L Final   ]

## 2017-05-01 ENCOUNTER — LAB (OUTPATIENT)
Dept: LAB | Facility: OTHER | Age: 62
End: 2017-05-01

## 2017-05-01 DIAGNOSIS — R19.7 DIARRHEA, UNSPECIFIED TYPE: ICD-10-CM

## 2017-05-01 PROCEDURE — 87493 C DIFF AMPLIFIED PROBE: CPT | Performed by: INTERNAL MEDICINE

## 2017-05-02 LAB — C DIFF TOX GENS STL QL NAA+PROBE: NEGATIVE

## 2017-05-16 ENCOUNTER — OFFICE VISIT (OUTPATIENT)
Dept: FAMILY MEDICINE CLINIC | Facility: CLINIC | Age: 62
End: 2017-05-16

## 2017-05-16 VITALS
HEART RATE: 72 BPM | BODY MASS INDEX: 34.16 KG/M2 | WEIGHT: 174 LBS | TEMPERATURE: 97.9 F | HEIGHT: 60 IN | SYSTOLIC BLOOD PRESSURE: 120 MMHG | DIASTOLIC BLOOD PRESSURE: 74 MMHG

## 2017-05-16 DIAGNOSIS — R19.7 POSTCHOLECYSTECTOMY DIARRHEA: ICD-10-CM

## 2017-05-16 DIAGNOSIS — F02.818 EARLY ONSET ALZHEIMER'S DISEASE WITH BEHAVIORAL DISTURBANCE (HCC): ICD-10-CM

## 2017-05-16 DIAGNOSIS — G30.0 EARLY ONSET ALZHEIMER'S DISEASE WITH BEHAVIORAL DISTURBANCE (HCC): ICD-10-CM

## 2017-05-16 DIAGNOSIS — F33.41 RECURRENT MAJOR DEPRESSIVE DISORDER, IN PARTIAL REMISSION (HCC): Primary | ICD-10-CM

## 2017-05-16 DIAGNOSIS — K91.89 POSTCHOLECYSTECTOMY DIARRHEA: ICD-10-CM

## 2017-05-16 DIAGNOSIS — G47.9 SLEEP DISORDER: ICD-10-CM

## 2017-05-16 DIAGNOSIS — F41.1 GENERALIZED ANXIETY DISORDER: ICD-10-CM

## 2017-05-16 PROCEDURE — 99214 OFFICE O/P EST MOD 30 MIN: CPT | Performed by: INTERNAL MEDICINE

## 2017-05-16 RX ORDER — VENLAFAXINE HYDROCHLORIDE 150 MG/1
150 CAPSULE, EXTENDED RELEASE ORAL DAILY
Qty: 30 CAPSULE | Refills: 11 | Status: SHIPPED | OUTPATIENT
Start: 2017-05-16 | End: 2017-11-16 | Stop reason: SDUPTHER

## 2017-05-31 RX ORDER — TIZANIDINE 4 MG/1
TABLET ORAL
Qty: 30 TABLET | Refills: 2 | Status: SHIPPED | OUTPATIENT
Start: 2017-05-31 | End: 2017-09-11 | Stop reason: SDUPTHER

## 2017-06-02 ENCOUNTER — OFFICE VISIT (OUTPATIENT)
Dept: FAMILY MEDICINE CLINIC | Facility: CLINIC | Age: 62
End: 2017-06-02

## 2017-06-02 VITALS
WEIGHT: 175.2 LBS | BODY MASS INDEX: 34.4 KG/M2 | HEIGHT: 60 IN | DIASTOLIC BLOOD PRESSURE: 88 MMHG | TEMPERATURE: 98.2 F | HEART RATE: 64 BPM | SYSTOLIC BLOOD PRESSURE: 126 MMHG

## 2017-06-02 DIAGNOSIS — F33.41 RECURRENT MAJOR DEPRESSIVE DISORDER, IN PARTIAL REMISSION (HCC): ICD-10-CM

## 2017-06-02 DIAGNOSIS — K58.0 IRRITABLE BOWEL SYNDROME WITH DIARRHEA: ICD-10-CM

## 2017-06-02 DIAGNOSIS — K62.5 RECTAL BLEEDING: Primary | ICD-10-CM

## 2017-06-02 DIAGNOSIS — K21.9 GASTROESOPHAGEAL REFLUX DISEASE WITHOUT ESOPHAGITIS: ICD-10-CM

## 2017-06-02 DIAGNOSIS — F02.80 EARLY ONSET ALZHEIMER'S DEMENTIA WITHOUT BEHAVIORAL DISTURBANCE (HCC): ICD-10-CM

## 2017-06-02 DIAGNOSIS — G30.0 EARLY ONSET ALZHEIMER'S DEMENTIA WITHOUT BEHAVIORAL DISTURBANCE (HCC): ICD-10-CM

## 2017-06-02 DIAGNOSIS — F41.1 GENERALIZED ANXIETY DISORDER: ICD-10-CM

## 2017-06-02 DIAGNOSIS — R19.7 DIARRHEA, UNSPECIFIED TYPE: ICD-10-CM

## 2017-06-02 PROCEDURE — 99214 OFFICE O/P EST MOD 30 MIN: CPT | Performed by: INTERNAL MEDICINE

## 2017-06-02 NOTE — PROGRESS NOTES
Subjective        History of Present Illness     Andria Goldstein is a 62 y.o. female here today reporting rectal bleeding.  She reports a 1-2 week history of bright red blood after a bowel movement.  She denies melena.  She saw blood on the toilet tissue yesterday, but none today.  She was here a couple of weeks ago with continued complaints of chronic diarrhea.  She denies constipation.  GERD symptoms adequately controlled with Prevacid.  She reports she has been experiencing diarrhea approximately every other day.  See prior notes for more details on her chronic diarrhea of undetermined etiology. At one time, she tested positive for Clostridium difficile and was treated with Flagyl with apparent temporary improvement of her symptoms. We checked a C. difficile toxin, which was negative. She sometimes expresses some fecal incontinence due to the diarrhea.  Most recent colonoscopy was 06/2016.  I recommended referral to Dr. Perkins, GI     Results of MRI obtained 05/25/17 due to concerns of memory loss revealed no evidence intracranial abnormality, but did reveal small vessel ischemic changes, which could explain some of her recent concerns with mild memory loss.  I referred her to Dr. Jack Dee for concerns of memory loss.  He felt she met criteria for dementia, he was not convinced this was Alzheimer's as the pattern noted by testing raisied the possibility for cerebrovascular disease with associated depression.        Review of Systems   Constitutional: Negative for chills, fatigue and fever.   HENT: Negative for congestion, ear pain, postnasal drip, sinus pressure and sore throat.    Respiratory: Negative for cough, shortness of breath and wheezing.    Cardiovascular: Negative for chest pain, palpitations and leg swelling.   Gastrointestinal: Positive for diarrhea. Negative for abdominal pain, blood in stool, constipation, nausea and vomiting.        Rectal bleeding   Endocrine: Negative for cold  "intolerance, heat intolerance, polydipsia and polyuria.   Genitourinary: Negative for dysuria, frequency, hematuria and urgency.   Skin: Negative for rash.   Neurological: Negative for syncope and weakness.      PHQ-9 Depression Screening 6/2/2017   Little interest or pleasure in doing things 0   Feeling down, depressed, or hopeless 0   Trouble falling or staying asleep, or sleeping too much 1   Feeling tired or having little energy 0   Poor appetite or overeating 0   Feeling bad about yourself - or that you are a failure or have let yourself or your family down 0   Trouble concentrating on things, such as reading the newspaper or watching television 0   Moving or speaking so slowly that other people could have noticed. Or the opposite - being so fidgety or restless that you have been moving around a lot more than usual 0   Thoughts that you would be better off dead, or of hurting yourself in some way 0   PHQ-9 Total Score 1   If you checked off any problems, how difficult have these problems made it for you to do your work, take care of things at home, or get along with other people? Not difficult at all         Objective     Vitals:    06/02/17 0849   BP: 126/88   Pulse: 64   Temp: 98.2 °F (36.8 °C)   TempSrc: Oral   Weight: 175 lb 3.2 oz (79.5 kg)   Height: 60\" (152.4 cm)       Physical Exam   Constitutional: She is oriented to person, place, and time. She appears well-developed and well-nourished. No distress.   HENT:   Head: Normocephalic and atraumatic.   Nose: Right sinus exhibits no maxillary sinus tenderness and no frontal sinus tenderness. Left sinus exhibits no maxillary sinus tenderness and no frontal sinus tenderness.   Mouth/Throat: Uvula is midline, oropharynx is clear and moist and mucous membranes are normal. No oral lesions. No tonsillar exudate.   Eyes: Conjunctivae and EOM are normal. Pupils are equal, round, and reactive to light.   Neck: Trachea normal. Neck supple. No JVD present. Carotid " bruit is not present. No tracheal deviation present. No thyroid mass and no thyromegaly present.   Cardiovascular: Normal rate, regular rhythm and normal heart sounds.   No extrasystoles are present. PMI is not displaced.    No murmur heard.  Pulmonary/Chest: Effort normal and breath sounds normal. No accessory muscle usage. No respiratory distress. She has no decreased breath sounds. She has no wheezes. She has no rhonchi. She has no rales.   Abdominal: Soft. Bowel sounds are normal. She exhibits no distension. There is no hepatosplenomegaly. There is no tenderness.       Vascular Status -  Her exam exhibits right foot vasculature normal. Her exam exhibits no right foot edema. Her exam exhibits left foot vasculature normal. Her exam exhibits no left foot edema.  Lymphadenopathy:     She has no cervical adenopathy.   Neurological: She is alert and oriented to person, place, and time. No cranial nerve deficit. Coordination normal.   Skin: Skin is warm, dry and intact. No rash noted. No cyanosis. Nails show no clubbing.   Psychiatric: She has a normal mood and affect. Her speech is normal and behavior is normal. Thought content normal.   Vitals reviewed.      Future Appointments  Date Time Provider Department Center   8/9/2017 10:00 AM Christofer Mckeon MD [facility] None       Assessment/Plan      Refer to Dr. Perkins, GI, for chronic diarrhea with new onset of rectal bleeding.       She was given reassurance on the MRI results. Continue the Aricept and Namenda.  Adequately managing her anxiety and depression is an essential part of managing her dementia issues.  Continue the current dose of Effexor XR.  Continue daily aspirin and statin therapy.  Continue current blood pressure meds.    Scribed for Dr. Mckeon by Cesilia Gutierrez [facility].     Diagnoses and all orders for this visit:    Rectal bleeding  -     Ambulatory Referral to Gastroenterology    Irritable bowel syndrome with diarrhea    Gastroesophageal reflux  disease without esophagitis    Diarrhea, unspecified type  -     Ambulatory Referral to Gastroenterology    Early onset Alzheimer's dementia without behavioral disturbance    Recurrent major depressive disorder, in partial remission    Generalized anxiety disorder        No visits with results within 3 Week(s) from this visit.  Latest known visit with results is:    Lab on 05/01/2017   Component Date Value Ref Range Status   • C. Difficile Toxins by PCR 05/01/2017 Negative  Negative Final   ]

## 2017-06-09 RX ORDER — GABAPENTIN 800 MG/1
TABLET ORAL
Qty: 60 TABLET | Refills: 2 | Status: SHIPPED | OUTPATIENT
Start: 2017-06-09 | End: 2017-10-18 | Stop reason: SDUPTHER

## 2017-06-16 ENCOUNTER — OFFICE VISIT (OUTPATIENT)
Dept: FAMILY MEDICINE CLINIC | Facility: CLINIC | Age: 62
End: 2017-06-16

## 2017-06-16 VITALS
BODY MASS INDEX: 34.36 KG/M2 | WEIGHT: 175 LBS | SYSTOLIC BLOOD PRESSURE: 128 MMHG | HEART RATE: 80 BPM | TEMPERATURE: 97.9 F | HEIGHT: 60 IN | DIASTOLIC BLOOD PRESSURE: 80 MMHG

## 2017-06-16 DIAGNOSIS — K21.9 GASTROESOPHAGEAL REFLUX DISEASE WITHOUT ESOPHAGITIS: Chronic | ICD-10-CM

## 2017-06-16 DIAGNOSIS — M25.362 INSTABILITY OF LEFT KNEE JOINT: ICD-10-CM

## 2017-06-16 DIAGNOSIS — G89.29 CHRONIC PAIN OF LEFT KNEE: Primary | ICD-10-CM

## 2017-06-16 DIAGNOSIS — M25.562 CHRONIC PAIN OF LEFT KNEE: Primary | ICD-10-CM

## 2017-06-16 DIAGNOSIS — E66.09 NON MORBID OBESITY DUE TO EXCESS CALORIES: ICD-10-CM

## 2017-06-16 DIAGNOSIS — M12.562 TRAUMATIC ARTHROPATHY, LEFT KNEE: ICD-10-CM

## 2017-06-16 DIAGNOSIS — M25.462 EFFUSION, LEFT KNEE: ICD-10-CM

## 2017-06-16 DIAGNOSIS — K29.30 CHRONIC SUPERFICIAL GASTRITIS WITHOUT BLEEDING: Chronic | ICD-10-CM

## 2017-06-16 PROBLEM — G30.0 EARLY ONSET ALZHEIMER'S DEMENTIA WITHOUT BEHAVIORAL DISTURBANCE: Chronic | Status: ACTIVE | Noted: 2017-03-23

## 2017-06-16 PROBLEM — F02.80 EARLY ONSET ALZHEIMER'S DEMENTIA WITHOUT BEHAVIORAL DISTURBANCE: Chronic | Status: ACTIVE | Noted: 2017-03-23

## 2017-06-16 PROCEDURE — 99213 OFFICE O/P EST LOW 20 MIN: CPT | Performed by: INTERNAL MEDICINE

## 2017-06-16 RX ORDER — MELOXICAM 15 MG/1
15 TABLET ORAL DAILY PRN
Qty: 30 TABLET | Refills: 0 | Status: SHIPPED | OUTPATIENT
Start: 2017-06-16 | End: 2019-12-11

## 2017-06-16 NOTE — PROGRESS NOTES
Subjective        History of Present Illness     Andria Goldstein is a 62 y.o. female here today reporting onset of left medial knee pain about three months ago. She reports a couple of falls outside on her steps landing on her knees causing trauma to the left knee prior to the onset of knee pain.  She reports instability of the left knee causing the knee to give away at times due to pain.   She has limited range of motion.  She takes Excedrin as well as Ultram she has at home for chronic back pain.  We will get x-rays of the left knee today and I recommended getting an MRI as soon as possible and referral to orthopedist.  She wishes to be referred to Dr. Pritchard.  She normally ambulates with a cane.  I recommended she avoid weight bearing on the left knee.      Review of Systems   Constitutional: Negative for chills, fatigue and fever.   HENT: Negative for congestion, ear pain, postnasal drip, sinus pressure and sore throat.    Respiratory: Negative for cough, shortness of breath and wheezing.    Cardiovascular: Negative for chest pain, palpitations and leg swelling.   Gastrointestinal: Negative for abdominal pain, blood in stool, constipation, diarrhea, nausea and vomiting.   Endocrine: Negative for cold intolerance, heat intolerance, polydipsia and polyuria.   Genitourinary: Negative for dysuria, frequency, hematuria and urgency.   Musculoskeletal:        Left knee pain.    Skin: Negative for rash.   Neurological: Negative for syncope and weakness.      PHQ-9 Depression Screening 6/2/2017   Little interest or pleasure in doing things 0   Feeling down, depressed, or hopeless 0   Trouble falling or staying asleep, or sleeping too much 1   Feeling tired or having little energy 0   Poor appetite or overeating 0   Feeling bad about yourself - or that you are a failure or have let yourself or your family down 0   Trouble concentrating on things, such as reading the newspaper or watching television 0   Moving or  "speaking so slowly that other people could have noticed. Or the opposite - being so fidgety or restless that you have been moving around a lot more than usual 0   Thoughts that you would be better off dead, or of hurting yourself in some way 0   PHQ-9 Total Score 1   If you checked off any problems, how difficult have these problems made it for you to do your work, take care of things at home, or get along with other people? Not difficult at all       Objective      Vitals:    06/16/17 1417   BP: 128/80   Pulse: 80   Temp: 97.9 °F (36.6 °C)   TempSrc: Oral   Weight: 175 lb (79.4 kg)   Height: 60\" (152.4 cm)         Physical Exam   Constitutional: She is oriented to person, place, and time. She appears well-developed and well-nourished. No distress.   Neck: Neck supple. No JVD present. No thyromegaly present.   Cardiovascular: Normal rate, regular rhythm and normal heart sounds.    Pulmonary/Chest: Effort normal and breath sounds normal. No accessory muscle usage. No respiratory distress. She has no wheezes. She has no rales.   Abdominal: Soft. Bowel sounds are normal. She exhibits no distension. There is no tenderness.   Musculoskeletal: She exhibits no edema.   Exam of left knee pain reveals range of motion is 15 degrees away from full extension and 10 degrees away from full flexion due to pain.  Small effusion anterior around patella, but no evidence of patella fracture. Quadriceps weakness.  Medial instability.       Lymphadenopathy:     She has no cervical adenopathy.   Neurological: She is alert and oriented to person, place, and time. No cranial nerve deficit.   Psychiatric: She has a normal mood and affect. Her speech is normal and behavior is normal.     Future Appointments  Date Time Provider Department Center   8/1/2017 9:15 AM Jaylen Cota MD [facility] None   8/9/2017 10:00 AM Christofer Mckeon MD [facility] None       Assessment/Plan      Prescription is given for a knee brace.  I recommended she avoid " weight bearingAs much as possible, using the cane for ambulation.  Prescription is sent for Mobic 15 mg q.d. with food, #30.  Continue with omeprazole and monitor closely for GI upset.  She can continue with Ultram for pain.  Weight loss will ultimately be helpful.    X-ray of the left knee today.  Refer for MRI of the left knee without contrast and refer to Dr. Pritchard, orthopedist, for evaluation and treatment.       Scribed for Dr. Mckeon by Cesilia Gutierrez [facility].     Diagnoses and all orders for this visit:    Chronic pain of left knee  -     XR Knee 3 View Left  -     MRI Knee Left Without Contrast; Future  -     Ambulatory Referral to Orthopedic Surgery    Instability of left knee joint  -     XR Knee 3 View Left  -     MRI Knee Left Without Contrast; Future  -     Ambulatory Referral to Orthopedic Surgery    Effusion, left knee  -     XR Knee 3 View Left  -     MRI Knee Left Without Contrast; Future  -     Ambulatory Referral to Orthopedic Surgery    Traumatic arthropathy, left knee  -     XR Knee 3 View Left  -     MRI Knee Left Without Contrast; Future  -     Ambulatory Referral to Orthopedic Surgery    Non morbid obesity due to excess calories    Other orders  -     meloxicam (MOBIC) 15 MG tablet; Take 1 tablet by mouth Daily As Needed (leg, back, or knee pain). Take with food      No visits with results within 3 Week(s) from this visit.  Latest known visit with results is:    Lab on 05/01/2017   Component Date Value Ref Range Status   • C. Difficile Toxins by PCR 05/01/2017 Negative  Negative Final   ]

## 2017-08-01 ENCOUNTER — OFFICE VISIT (OUTPATIENT)
Dept: GASTROENTEROLOGY | Facility: CLINIC | Age: 62
End: 2017-08-01

## 2017-08-01 VITALS
BODY MASS INDEX: 34.95 KG/M2 | WEIGHT: 178 LBS | HEIGHT: 60 IN | DIASTOLIC BLOOD PRESSURE: 82 MMHG | HEART RATE: 75 BPM | SYSTOLIC BLOOD PRESSURE: 130 MMHG

## 2017-08-01 DIAGNOSIS — K58.0 IRRITABLE BOWEL SYNDROME WITH DIARRHEA: Primary | ICD-10-CM

## 2017-08-01 PROCEDURE — 99214 OFFICE O/P EST MOD 30 MIN: CPT | Performed by: INTERNAL MEDICINE

## 2017-08-01 NOTE — PROGRESS NOTES
[facility]      Chief Complaint:   Chief Complaint   Patient presents with   • Diarrhea   • Rectal Bleeding     Ref Linda       Subjective     HPI:   She with irritable bowel syndrome with diarrhea.  Patient states that she continues to have diarrhea and occasionally some small amounts of blood in stool.  Patient a colonoscopy last year which didn't show any anatomical problems in the colon.    Plan; we'll start patient on Xifaxan 550 mg 3 times a day for 2 weeks the patient follow up in 2 months see if any improvement in symptoms.    Past Medical History:   Past Medical History:   Diagnosis Date   • Actinic keratosis     h/o, Bilateral temples   • Cerebrovascular disease     S/P lacunar infarct 2012. Sm. vessel ischemic maldonado      • Chronic headache disorder     Chronic daily headache. Started Topamax, 1/2016. Stopped Topamax, 4/2016      • Degeneration of cervical intervertebral disc     Mild. Left upper extremity radiculopathy      • Degeneration of thoracic intervertebral disc     Osteophytes most predominant T9/T10      • Depressive disorder     Started Celexa 8/2014   • Diarrhea     H/O C. difficle diarrhea Spring 2016   • Disc disorder of lumbar region    • Disorder of lumbar spine     Lumbar DJD. Prior lumbar fusion with hardware 2008    • Essential hypertension     Changed to lisinopril HCT to lower dose lisinopril, 5/2016      • Gastritis     Symptoms aggravated by NSAIDs      • Gastroesophageal reflux disease    • History of colon polyps     X 3  1/2010   • History of colonoscopy 06/20/2016    (90498). External and internal hemorrhoids. Several biopsies obtained in the entire colon.   • History of mammogram 01/25/2016    Encounter for screening mammogram for malignant neoplasm of breast   • Hyperlipidemia     On Zocor   • Impaired fasting glucose    • Irritable bowel syndrome     diarrhea predominant    • Low back pain    • Lumbar radiculopathy     Left lower extremity   • Malaise  and fatigue    • Megaloblastic anemia due to vitamin B12 deficiency     On oral B-12 500 µg twice weekly      • Melena     h/o. Dark stools reported and referral to GI service, 3/2016      • Mild intermittent asthma with acute exacerbation    • Obesity    • Osteoarthritis    • Osteoarthritis of knees, bilateral     right more than left. Referred to Dr. Harman, 1/2016   • Spasm of back muscles     Predominantly lumbar   • Umbilical hernia without obstruction or gangrene     fat only   • Vaginitis and vulvovaginitis     Unspecified       Family History:  Family History   Problem Relation Age of Onset   • Cancer Cousin    • Colon cancer Cousin      Colorectal cancer   • Diabetes Cousin    • Heart disease Cousin    • Hypertension Cousin    • Breast cancer Cousin        Social History:   reports that she has never smoked. She has never used smokeless tobacco. She reports that she does not drink alcohol or use illicit drugs.    Medications:   Current Outpatient Prescriptions   Medication Sig Dispense Refill   • aspirin 81 MG tablet Take 81 mg by mouth daily.     • donepezil (ARICEPT) 5 MG tablet Take 1 tablet by mouth Every Night. 30 tablet 6   • gabapentin (NEURONTIN) 800 MG tablet TAKE 1/2 TABLET IN THE MORNING AND AT NOON AND 1 BY MOUTH EVERY NIGHT AT BEDTIME FOR CHRONIC BACK PAIN 60 tablet 2   • lansoprazole (PREVACID) 30 MG capsule Take  by mouth Daily.     • lisinopril-hydrochlorothiazide (PRINZIDE,ZESTORETIC) 20-12.5 MG per tablet Take 1 tablet by mouth Every Morning. For blood pressure. 30 tablet 11   • meloxicam (MOBIC) 15 MG tablet Take 1 tablet by mouth Daily As Needed (leg, back, or knee pain). Take with food 30 tablet 0   • memantine (NAMENDA) 10 MG tablet Take 1 tablet by mouth 2 (Two) Times a Day. 60 tablet 11   • omeprazole (priLOSEC) 40 MG capsule TAKE 1 CAPSULE BY MOUTH EVERY MORNING 30 capsule 12   • PROAIR  (90 BASE) MCG/ACT inhaler 2 PUFF(S) 4 TIMES PER DAY AS NEEDED 8.5 g 0   • simvastatin  "(ZOCOR) 20 MG tablet TAKE ONE TABLET BY MOUTH EVERY NIGHT AT BEDTIME FOR CHOLESTEROL 30 tablet 11   • tiZANidine (ZANAFLEX) 4 MG tablet TAKE ONE TABLET BY MOUTH EVERY NIGHT AT BEDTIME FOR MUSCLE SPASM 30 tablet 2   • traMADol (ULTRAM) 50 MG tablet Take  by mouth. Take 1 tablet 3 times a day as needed.     • venlafaxine XR (EFFEXOR-XR) 150 MG 24 hr capsule Take 1 capsule by mouth Daily. 30 capsule 11   • vitamin B-12 (CYANOCOBALAMIN) 500 MCG tablet Take  by mouth. Take 1 tablet every other day.     • rifaximin (XIFAXAN) 550 MG tablet Take 1 tablet by mouth Every 8 (Eight) Hours. 42 tablet 0     No current facility-administered medications for this visit.        Allergies:  Iodinated diagnostic agents and Other    ROS:    Review of Systems   Constitutional: Negative for activity change, appetite change, chills, diaphoresis, fatigue, fever and unexpected weight change.   HENT: Negative for sore throat and trouble swallowing.    Respiratory: Negative for shortness of breath.    Gastrointestinal: Negative for abdominal distention, abdominal pain, anal bleeding, blood in stool, constipation, diarrhea, nausea, rectal pain and vomiting.   Musculoskeletal: Negative for arthralgias.   Skin: Negative for pallor.   Neurological: Negative for light-headedness.     Objective     Blood pressure 130/82, pulse 75, height 60\" (152.4 cm), weight 178 lb (80.7 kg).    Physical Exam   Constitutional: She is oriented to person, place, and time. She appears well-developed and well-nourished. No distress.   HENT:   Head: Normocephalic and atraumatic.   Cardiovascular: Normal rate, regular rhythm, normal heart sounds and intact distal pulses.  Exam reveals no gallop and no friction rub.    No murmur heard.  Pulmonary/Chest: Breath sounds normal. No respiratory distress. She has no wheezes. She has no rales. She exhibits no tenderness.   Abdominal: Soft. Bowel sounds are normal. She exhibits no distension and no mass. There is no tenderness. " There is no rebound and no guarding. No hernia.   Musculoskeletal: Normal range of motion. She exhibits no edema.   Neurological: She is alert and oriented to person, place, and time.   Skin: Skin is warm and dry. No rash noted. She is not diaphoretic. No erythema. No pallor.   Psychiatric: She has a normal mood and affect. Her behavior is normal. Judgment and thought content normal.        Assessment/Plan   Andria was seen today for diarrhea and rectal bleeding.    Diagnoses and all orders for this visit:    Irritable bowel syndrome with diarrhea    Other orders  -     rifaximin (XIFAXAN) 550 MG tablet; Take 1 tablet by mouth Every 8 (Eight) Hours.        * Surgery not found *     Diagnosis Plan   1. Irritable bowel syndrome with diarrhea         Anticipated Surgical Procedure:  No orders of the defined types were placed in this encounter.      The risks, benefits, and alternatives of this procedure have been discussed with the patient or the responsible party- the patient understands and agrees to proceed.

## 2017-08-02 ENCOUNTER — LAB (OUTPATIENT)
Dept: LAB | Facility: OTHER | Age: 62
End: 2017-08-02

## 2017-08-02 DIAGNOSIS — D53.1 MEGALOBLASTIC ANEMIA DUE TO VITAMIN B12 DEFICIENCY: ICD-10-CM

## 2017-08-02 DIAGNOSIS — R73.01 IMPAIRED FASTING GLUCOSE: ICD-10-CM

## 2017-08-02 DIAGNOSIS — E78.5 HYPERLIPIDEMIA, UNSPECIFIED HYPERLIPIDEMIA TYPE: ICD-10-CM

## 2017-08-02 DIAGNOSIS — I10 ESSENTIAL HYPERTENSION: ICD-10-CM

## 2017-08-02 LAB
ALBUMIN SERPL-MCNC: 4.3 G/DL (ref 3.2–5.5)
ALBUMIN/GLOB SERPL: 1.7 G/DL (ref 1–3)
ALP SERPL-CCNC: 77 U/L (ref 15–121)
ALT SERPL W P-5'-P-CCNC: 15 U/L (ref 10–60)
ANION GAP SERPL CALCULATED.3IONS-SCNC: 10 MMOL/L (ref 5–15)
AST SERPL-CCNC: 19 U/L (ref 10–60)
BASOPHILS # BLD AUTO: 0.02 10*3/MM3 (ref 0–0.2)
BASOPHILS NFR BLD AUTO: 0.3 % (ref 0–2)
BILIRUB SERPL-MCNC: 0.6 MG/DL (ref 0.2–1)
BUN BLD-MCNC: 23 MG/DL (ref 8–25)
BUN/CREAT SERPL: 28.8 (ref 7–25)
CALCIUM SPEC-SCNC: 9.8 MG/DL (ref 8.4–10.8)
CHLORIDE SERPL-SCNC: 102 MMOL/L (ref 100–112)
CHOLEST SERPL-MCNC: 192 MG/DL (ref 150–200)
CO2 SERPL-SCNC: 31 MMOL/L (ref 20–32)
CREAT BLD-MCNC: 0.8 MG/DL (ref 0.4–1.3)
DEPRECATED RDW RBC AUTO: 41.9 FL (ref 36.4–46.3)
EOSINOPHIL # BLD AUTO: 0.18 10*3/MM3 (ref 0–0.7)
EOSINOPHIL NFR BLD AUTO: 2.6 % (ref 0–7)
ERYTHROCYTE [DISTWIDTH] IN BLOOD BY AUTOMATED COUNT: 13 % (ref 11.5–14.5)
GFR SERPL CREATININE-BSD FRML MDRD: 72 ML/MIN/1.73 (ref 45–104)
GLOBULIN UR ELPH-MCNC: 2.6 GM/DL (ref 2.5–4.6)
GLUCOSE BLD-MCNC: 116 MG/DL (ref 70–100)
HCT VFR BLD AUTO: 42.8 % (ref 35–45)
HDLC SERPL-MCNC: 73 MG/DL (ref 35–100)
HGB BLD-MCNC: 13.9 G/DL (ref 12–15.5)
LDLC SERPL CALC-MCNC: 98 MG/DL
LDLC/HDLC SERPL: 1.35 {RATIO}
LYMPHOCYTES # BLD AUTO: 1.7 10*3/MM3 (ref 0.6–4.2)
LYMPHOCYTES NFR BLD AUTO: 24.9 % (ref 10–50)
MCH RBC QN AUTO: 29.3 PG (ref 26.5–34)
MCHC RBC AUTO-ENTMCNC: 32.5 G/DL (ref 31.4–36)
MCV RBC AUTO: 90.1 FL (ref 80–98)
MONOCYTES # BLD AUTO: 0.46 10*3/MM3 (ref 0–0.9)
MONOCYTES NFR BLD AUTO: 6.7 % (ref 0–12)
NEUTROPHILS # BLD AUTO: 4.46 10*3/MM3 (ref 2–8.6)
NEUTROPHILS NFR BLD AUTO: 65.5 % (ref 37–80)
PLATELET # BLD AUTO: 177 10*3/MM3 (ref 150–450)
PMV BLD AUTO: 13 FL (ref 8–12)
POTASSIUM BLD-SCNC: 4.4 MMOL/L (ref 3.4–5.4)
PROT SERPL-MCNC: 6.9 G/DL (ref 6.7–8.2)
RBC # BLD AUTO: 4.75 10*6/MM3 (ref 3.77–5.16)
SODIUM BLD-SCNC: 143 MMOL/L (ref 134–146)
TRIGL SERPL-MCNC: 103 MG/DL (ref 35–160)
VLDLC SERPL-MCNC: 20.6 MG/DL
WBC NRBC COR # BLD: 6.82 10*3/MM3 (ref 3.2–9.8)

## 2017-08-02 PROCEDURE — 85025 COMPLETE CBC W/AUTO DIFF WBC: CPT | Performed by: INTERNAL MEDICINE

## 2017-08-02 PROCEDURE — 83036 HEMOGLOBIN GLYCOSYLATED A1C: CPT | Performed by: INTERNAL MEDICINE

## 2017-08-02 PROCEDURE — 80061 LIPID PANEL: CPT | Performed by: INTERNAL MEDICINE

## 2017-08-02 PROCEDURE — 80053 COMPREHEN METABOLIC PANEL: CPT | Performed by: INTERNAL MEDICINE

## 2017-08-02 PROCEDURE — 82607 VITAMIN B-12: CPT | Performed by: INTERNAL MEDICINE

## 2017-08-02 PROCEDURE — 36415 COLL VENOUS BLD VENIPUNCTURE: CPT | Performed by: INTERNAL MEDICINE

## 2017-08-03 LAB
HBA1C MFR BLD: 5.3 % (ref 4–5.6)
VIT B12 BLD-MCNC: 897 PG/ML (ref 239–931)

## 2017-08-09 ENCOUNTER — OFFICE VISIT (OUTPATIENT)
Dept: FAMILY MEDICINE CLINIC | Facility: CLINIC | Age: 62
End: 2017-08-09

## 2017-08-09 VITALS
WEIGHT: 178 LBS | SYSTOLIC BLOOD PRESSURE: 122 MMHG | TEMPERATURE: 97.2 F | HEIGHT: 60 IN | BODY MASS INDEX: 34.95 KG/M2 | HEART RATE: 78 BPM | OXYGEN SATURATION: 96 % | DIASTOLIC BLOOD PRESSURE: 78 MMHG

## 2017-08-09 DIAGNOSIS — K91.89 POSTCHOLECYSTECTOMY DIARRHEA: Chronic | ICD-10-CM

## 2017-08-09 DIAGNOSIS — K29.30 CHRONIC SUPERFICIAL GASTRITIS WITHOUT BLEEDING: Chronic | ICD-10-CM

## 2017-08-09 DIAGNOSIS — K21.9 GASTROESOPHAGEAL REFLUX DISEASE WITHOUT ESOPHAGITIS: Chronic | ICD-10-CM

## 2017-08-09 DIAGNOSIS — I10 ESSENTIAL HYPERTENSION: Chronic | ICD-10-CM

## 2017-08-09 DIAGNOSIS — G30.0 EARLY ONSET ALZHEIMER'S DEMENTIA WITHOUT BEHAVIORAL DISTURBANCE (HCC): Chronic | ICD-10-CM

## 2017-08-09 DIAGNOSIS — E78.2 MIXED HYPERLIPIDEMIA: Primary | Chronic | ICD-10-CM

## 2017-08-09 DIAGNOSIS — E66.09 NON MORBID OBESITY DUE TO EXCESS CALORIES: Chronic | ICD-10-CM

## 2017-08-09 DIAGNOSIS — I67.9 CEREBROVASCULAR DISEASE: Chronic | ICD-10-CM

## 2017-08-09 DIAGNOSIS — R19.7 POSTCHOLECYSTECTOMY DIARRHEA: Chronic | ICD-10-CM

## 2017-08-09 DIAGNOSIS — F02.80 EARLY ONSET ALZHEIMER'S DEMENTIA WITHOUT BEHAVIORAL DISTURBANCE (HCC): Chronic | ICD-10-CM

## 2017-08-09 DIAGNOSIS — D53.1 MEGALOBLASTIC ANEMIA DUE TO VITAMIN B12 DEFICIENCY: Chronic | ICD-10-CM

## 2017-08-09 PROBLEM — F33.41 RECURRENT MAJOR DEPRESSIVE DISORDER, IN PARTIAL REMISSION (HCC): Chronic | Status: ACTIVE | Noted: 2017-04-26

## 2017-08-09 PROCEDURE — 99214 OFFICE O/P EST MOD 30 MIN: CPT | Performed by: INTERNAL MEDICINE

## 2017-08-09 RX ORDER — CHOLESTYRAMINE 4 G/9G
1 POWDER, FOR SUSPENSION ORAL
Qty: 90 PACKET | Refills: 11 | Status: SHIPPED | OUTPATIENT
Start: 2017-08-09 | End: 2018-02-13

## 2017-08-09 NOTE — PATIENT INSTRUCTIONS
Calorie Counting for Weight Loss  Calories are energy you get from the things you eat and drink. Your body uses this energy to keep you going throughout the day. The number of calories you eat affects your weight. When you eat more calories than your body needs, your body stores the extra calories as fat. When you eat fewer calories than your body needs, your body burns fat to get the energy it needs.  Calorie counting means keeping track of how many calories you eat and drink each day. If you make sure to eat fewer calories than your body needs, you should lose weight. In order for calorie counting to work, you will need to eat the number of calories that are right for you in a day to lose a healthy amount of weight per week. A healthy amount of weight to lose per week is usually 1-2 lb (0.5-0.9 kg). A dietitian can determine how many calories you need in a day and give you suggestions on how to reach your calorie goal.   WHAT IS MY MY PLAN?  My goal is to have __________ calories per day.   If I have this many calories per day, I should lose around __________ pounds per week.  WHAT DO I NEED TO KNOW ABOUT CALORIE COUNTING?  In order to meet your daily calorie goal, you will need to:  · Find out how many calories are in each food you would like to eat. Try to do this before you eat.  · Decide how much of the food you can eat.  · Write down what you ate and how many calories it had. Doing this is called keeping a food log.  WHERE DO I FIND CALORIE INFORMATION?  The number of calories in a food can be found on a Nutrition Facts label. Note that all the information on a label is based on a specific serving of the food. If a food does not have a Nutrition Facts label, try to look up the calories online or ask your dietitian for help.  HOW DO I DECIDE HOW MUCH TO EAT?  To decide how much of the food you can eat, you will need to consider both the number of calories in one serving and the size of one serving. This  information can be found on the Nutrition Facts label. If a food does not have a Nutrition Facts label, look up the information online or ask your dietitian for help.  Remember that calories are listed per serving. If you choose to have more than one serving of a food, you will have to multiply the calories per serving by the amount of servings you plan to eat. For example, the label on a package of bread might say that a serving size is 1 slice and that there are 90 calories in a serving. If you eat 1 slice, you will have eaten 90 calories. If you eat 2 slices, you will have eaten 180 calories.  HOW DO I KEEP A FOOD LOG?  After each meal, record the following information in your food log:  · What you ate.  · How much of it you ate.  · How many calories it had.  · Then, add up your calories.  Keep your food log near you, such as in a small notebook in your pocket. Another option is to use a mobile allegra or website. Some programs will calculate calories for you and show you how many calories you have left each time you add an item to the log.  WHAT ARE SOME CALORIE COUNTING TIPS?  · Use your calories on foods and drinks that will fill you up and not leave you hungry. Some examples of this include foods like nuts and nut butters, vegetables, lean proteins, and high-fiber foods (more than 5 g fiber per serving).  · Eat nutritious foods and avoid empty calories. Empty calories are calories you get from foods or beverages that do not have many nutrients, such as candy and soda. It is better to have a nutritious high-calorie food (such as an avocado) than a food with few nutrients (such as a bag of chips).  · Know how many calories are in the foods you eat most often. This way, you do not have to look up how many calories they have each time you eat them.  · Look out for foods that may seem like low-calorie foods but are really high-calorie foods, such as baked goods, soda, and fat-free candy.  · Pay attention to calories  in drinks. Drinks such as sodas, specialty coffee drinks, alcohol, and juices have a lot of calories yet do not fill you up. Choose low-calorie drinks like water and diet drinks.  · Focus your calorie counting efforts on higher calorie items. Logging the calories in a garden salad that contains only vegetables is less important than calculating the calories in a milk shake.  · Find a way of tracking calories that works for you. Get creative. Most people who are successful find ways to keep track of how much they eat in a day, even if they do not count every calorie.  WHAT ARE SOME PORTION CONTROL TIPS?  · Know how many calories are in a serving. This will help you know how many servings of a certain food you can have.  · Use a measuring cup to measure serving sizes. This is helpful when you start out. With time, you will be able to estimate serving sizes for some foods.  · Take some time to put servings of different foods on your favorite plates, bowls, and cups so you know what a serving looks like.  · Try not to eat straight from a bag or box. Doing this can lead to overeating. Put the amount you would like to eat in a cup or on a plate to make sure you are eating the right portion.  · Use smaller plates, glasses, and bowls to prevent overeating. This is a quick and easy way to practice portion control. If your plate is smaller, less food can fit on it.  · Try not to multitask while eating, such as watching TV or using your computer. If it is time to eat, sit down at a table and enjoy your food. Doing this will help you to start recognizing when you are full. It will also make you more aware of what and how much you are eating.  HOW CAN I CALORIE COUNT WHEN EATING OUT?  · Ask for smaller portion sizes or child-sized portions.  · Consider sharing an entree and sides instead of getting your own entree.  · If you get your own entree, eat only half. Ask for a box at the beginning of your meal and put the rest of your  "entree in it so you are not tempted to eat it.  · Look for the calories on the menu. If calories are listed, choose the lower calorie options.  · Choose dishes that include vegetables, fruits, whole grains, low-fat dairy products, and lean protein. Focusing on smart food choices from each of the 5 food groups can help you stay on track at restaurants.  · Choose items that are boiled, broiled, grilled, or steamed.  · Choose water, milk, unsweetened iced tea, or other drinks without added sugars. If you want an alcoholic beverage, choose a lower calorie option. For example, a regular nino can have up to 700 calories and a glass of wine has around 150.  · Stay away from items that are buttered, battered, fried, or served with cream sauce. Items labeled \"crispy\" are usually fried, unless stated otherwise.  · Ask for dressings, sauces, and syrups on the side. These are usually very high in calories, so do not eat much of them.  · Watch out for salads. Many people think salads are a healthy option, but this is often not the case. Many salads come with saleem, fried chicken, lots of cheese, fried chips, and dressing. All of these items have a lot of calories. If you want a salad, choose a garden salad and ask for grilled meats or steak. Ask for the dressing on the side, or ask for olive oil and vinegar or lemon to use as dressing.  · Estimate how many servings of a food you are given. For example, a serving of cooked rice is ½ cup or about the size of half a tennis ball or one cupcake wrapper. Knowing serving sizes will help you be aware of how much food you are eating at restaurants. The list below tells you how big or small some common portion sizes are based on everyday objects.    1 oz--4 stacked dice.    3 oz--1 deck of cards.    1 tsp--1 dice.    1 Tbsp--½ a Ping-Pong ball.    2 Tbsp--1 Ping-Pong ball.    ½ cup--1 tennis ball or 1 cupcake wrapper.    1 cup--1 baseball.     This information is not intended to " replace advice given to you by your health care provider. Make sure you discuss any questions you have with your health care provider.     Document Released: 12/18/2006 Document Revised: 01/08/2016 Document Reviewed: 10/23/2014  [employer] Interactive Patient Education ©2017 [employer]      Exercising to Lose Weight  Exercising can help you to lose weight. In order to lose weight through exercise, you need to do vigorous-intensity exercise. You can tell that you are exercising with vigorous intensity if you are breathing very hard and fast and cannot hold a conversation while exercising.  Moderate-intensity exercise helps to maintain your current weight. You can tell that you are exercising at a moderate level if you have a higher heart rate and faster breathing, but you are still able to hold a conversation.  HOW OFTEN SHOULD I EXERCISE?  Choose an activity that you enjoy and set realistic goals. Your health care provider can help you to make an activity plan that works for you. Exercise regularly as directed by your health care provider. This may include:  · Doing resistance training twice each week, such as:    Push-ups.    Sit-ups.    Lifting weights.    Using resistance bands.  · Doing a given intensity of exercise for a given amount of time. Choose from these options:    150 minutes of moderate-intensity exercise every week.    75 minutes of vigorous-intensity exercise every week.    A mix of moderate-intensity and vigorous-intensity exercise every week.  Children, pregnant women, people who are out of shape, people who are overweight, and older adults may need to consult a health care provider for individual recommendations. If you have any sort of medical condition, be sure to consult your health care provider before starting a new exercise program.  WHAT ARE SOME ACTIVITIES THAT CAN HELP ME TO LOSE WEIGHT?   · Walking at a rate of at least 4.5 miles an hour.  · Jogging or running at a rate of 5 miles per  hour.  · Biking at a rate of at least 10 miles per hour.  · Lap swimming.  · Roller-skating or in-line skating.  · Cross-country skiing.  · Vigorous competitive sports, such as football, basketball, and soccer.  · Jumping rope.  · Aerobic dancing.  HOW CAN I BE MORE ACTIVE IN MY DAY-TO-DAY ACTIVITIES?  · Use the stairs instead of the elevator.  · Take a walk during your lunch break.  · If you drive, park your car farther away from work or school.  · If you take public transportation, get off one stop early and walk the rest of the way.  · Make all of your phone calls while standing up and walking around.  · Get up, stretch, and walk around every 30 minutes throughout the day.  WHAT GUIDELINES SHOULD I FOLLOW WHILE EXERCISING?  · Do not exercise so much that you hurt yourself, feel dizzy, or get very short of breath.  · Consult your health care provider prior to starting a new exercise program.  · Wear comfortable clothes and shoes with good support.  · Drink plenty of water while you exercise to prevent dehydration or heat stroke. Body water is lost during exercise and must be replaced.  · Work out until you breathe faster and your heart beats faster.     This information is not intended to replace advice given to you by your health care provider. Make sure you discuss any questions you have with your health care provider.     Document Released: 01/20/2012 Document Revised: 01/08/2016 Document Reviewed: 05/21/2015  [employer] Interactive Patient Education ©2017 [employer]

## 2017-08-09 NOTE — PROGRESS NOTES
Subjective        History of Present Illness     Andria Goldstein is a 62 y.o. Female here for a 6-month follow up on hyperlipidemia, hypertension, cerebrovascular disease, apparent Alzheimer's dementia, chronic diarrhea of undetermined etiology, impaired fasting glucose and vitamin B-12 deficiency among other issues.     Six months ago, I restarted Aricept 5 mg daily and referred her to Dr. Dee for neuropsychiatric evaluation. I added Namenda in April.  Dr. Dee felt she met criteria for dementia, but he was not convinced this was Alzheimer's as the pattern noted by testing raisied the possibility for cerebrovascular disease with associated depression. Results of MRI obtained 05/25/17 due to concerns of memory loss revealed no evidence intracranial abnormality, but did reveal small vessel ischemic changes, which could explain some of her recent concerns with mild memory loss.     She has been struggling with chronic diarrhea, for which Dr. Perkins started Xifaxan 550 mg t.i.d.  She reports she could not afford the $200 co-pay and did not start the medication.  She has not heard back from his office regarding alternative options.  She continues to have 3-4 episodes of loose stool daily.  I recommended she contact his office and she is given the office number today.  She tried cholestyramine in the past and I have recommended we try this again and she is in agreement.      She reports her left medial knee pain has improved.      Weight is up 7 pounds from six months ago.  Blood pressure is at goal.          The patient's relevant past medical, surgical, and social history was reviewed in Epic.   Lab results are reviewed with the patient today.  CBC unremarkable.  Electrolytes normal.  Renal and liver function normal.  LDL is 78 with simvastatin.  B-12 is at goal with current oral supplement.      Review of Systems   Constitutional: Negative for chills, fatigue and fever.   HENT: Negative for congestion,  "ear pain, postnasal drip, sinus pressure and sore throat.    Respiratory: Negative for cough, shortness of breath and wheezing.    Cardiovascular: Negative for chest pain, palpitations and leg swelling.   Gastrointestinal: Negative for abdominal pain, blood in stool, constipation, diarrhea, nausea and vomiting.   Endocrine: Negative for cold intolerance, heat intolerance, polydipsia and polyuria.   Genitourinary: Negative for dysuria, frequency, hematuria and urgency.   Skin: Negative for rash.   Neurological: Negative for syncope and weakness.      PHQ-9 Depression Screening 8/9/2017   Little interest or pleasure in doing things 1   Feeling down, depressed, or hopeless 0   Trouble falling or staying asleep, or sleeping too much 2   Feeling tired or having little energy 2   Poor appetite or overeating 0   Feeling bad about yourself - or that you are a failure or have let yourself or your family down 0   Trouble concentrating on things, such as reading the newspaper or watching television 2   Moving or speaking so slowly that other people could have noticed. Or the opposite - being so fidgety or restless that you have been moving around a lot more than usual 0   Thoughts that you would be better off dead, or of hurting yourself in some way 0   PHQ-9 Total Score 7   If you checked off any problems, how difficult have these problems made it for you to do your work, take care of things at home, or get along with other people? Not difficult at all         Objective     Vitals:    08/09/17 1006   BP: 122/78   Pulse: 78   Temp: 97.2 °F (36.2 °C)   TempSrc: Oral   SpO2: 96%   Weight: 178 lb (80.7 kg)   Height: 60\" (152.4 cm)     Physical Exam   Constitutional: She is oriented to person, place, and time. She appears well-developed and well-nourished. No distress.   Accompanied by her granddaughter.  Evidence of dementia, but no agitation   HENT:   Head: Normocephalic and atraumatic.   Nose: Right sinus exhibits no maxillary " sinus tenderness and no frontal sinus tenderness. Left sinus exhibits no maxillary sinus tenderness and no frontal sinus tenderness.   Mouth/Throat: Uvula is midline, oropharynx is clear and moist and mucous membranes are normal. No oral lesions. No tonsillar exudate.   Eyes: Conjunctivae and EOM are normal. Pupils are equal, round, and reactive to light.   Neck: Trachea normal. Neck supple. No JVD present. Carotid bruit is not present. No tracheal deviation present. No thyroid mass and no thyromegaly present.   Cardiovascular: Normal rate, regular rhythm and normal heart sounds.   No extrasystoles are present. PMI is not displaced.    No murmur heard.  Pulmonary/Chest: Effort normal and breath sounds normal. No accessory muscle usage. No respiratory distress. She has no decreased breath sounds. She has no wheezes. She has no rhonchi. She has no rales.   Abdominal: Soft. Bowel sounds are normal. She exhibits no distension. There is no hepatosplenomegaly. There is no tenderness.       Vascular Status -  Her exam exhibits right foot vasculature normal. Her exam exhibits no right foot edema. Her exam exhibits left foot vasculature normal. Her exam exhibits no left foot edema.  Lymphadenopathy:     She has no cervical adenopathy.   Neurological: She is alert and oriented to person, place, and time. No cranial nerve deficit. Coordination normal.   Skin: Skin is warm, dry and intact. No rash noted. No cyanosis. Nails show no clubbing.   Psychiatric: She has a normal mood and affect. Her speech is normal and behavior is normal. Thought content normal.   Vitals reviewed.    Future Appointments  Date Time Provider Department Center   10/10/2017 9:15 AM Jaylen Cota MD [facility] None   2/13/2018 9:30 AM Christofer Mckeon MD [facility] None       Assessment/Plan      Start cholestyramine 4 g to take 1 packet by mouth 3 (Three) Times a Day With Meals for the loose stool. She could not afford the  $200 co-pay on the Xifaxan.       Pursue diet, exercise, and weight loss efforts.  Written literature regarding diet and exercise are included in patient's AVS today.       Continue with combination of Aricept and Namenda.  Continue other medications and vitamin and mineral supplements to treat additional medical problems which we addressed today.  She will return in six months for follow up with fasting labs one week prior.      Scribed for Dr. Mckeon by Cesilia Gutierrez [facility].     Diagnoses and all orders for this visit:    Mixed hyperlipidemia    Essential hypertension    Cerebrovascular disease    Non morbid obesity due to excess calories    Gastroesophageal reflux disease without esophagitis    Chronic superficial gastritis without bleeding    Postcholecystectomy diarrhea    Megaloblastic anemia due to vitamin B12 deficiency    Early onset Alzheimer's dementia without behavioral disturbance- possible vascular dementia    Other orders  -     cholestyramine (QUESTRAN) 4 G packet; Take 1 packet by mouth 3 (Three) Times a Day With Meals.      Lab on 08/02/2017   Component Date Value Ref Range Status   • WBC 08/02/2017 6.82  3.20 - 9.80 10*3/mm3 Final   • RBC 08/02/2017 4.75  3.77 - 5.16 10*6/mm3 Final   • Hemoglobin 08/02/2017 13.9  12.0 - 15.5 g/dL Final   • Hematocrit 08/02/2017 42.8  35.0 - 45.0 % Final   • MCV 08/02/2017 90.1  80.0 - 98.0 fL Final   • MCH 08/02/2017 29.3  26.5 - 34.0 pg Final   • MCHC 08/02/2017 32.5  31.4 - 36.0 g/dL Final   • RDW 08/02/2017 13.0  11.5 - 14.5 % Final   • RDW-SD 08/02/2017 41.9  36.4 - 46.3 fl Final   • MPV 08/02/2017 13.0* 8.0 - 12.0 fL Final   • Platelets 08/02/2017 177  150 - 450 10*3/mm3 Final   • Neutrophil % 08/02/2017 65.5  37.0 - 80.0 % Final   • Lymphocyte % 08/02/2017 24.9  10.0 - 50.0 % Final   • Monocyte % 08/02/2017 6.7  0.0 - 12.0 % Final   • Eosinophil % 08/02/2017 2.6  0.0 - 7.0 % Final   • Basophil % 08/02/2017 0.3  0.0 - 2.0 % Final   • Neutrophils, Absolute 08/02/2017 4.46  2.00 - 8.60  10*3/mm3 Final   • Lymphocytes, Absolute 08/02/2017 1.70  0.60 - 4.20 10*3/mm3 Final   • Monocytes, Absolute 08/02/2017 0.46  0.00 - 0.90 10*3/mm3 Final   • Eosinophils, Absolute 08/02/2017 0.18  0.00 - 0.70 10*3/mm3 Final   • Basophils, Absolute 08/02/2017 0.02  0.00 - 0.20 10*3/mm3 Final   • Glucose 08/02/2017 116* 70 - 100 mg/dL Final   • BUN 08/02/2017 23  8 - 25 mg/dL Final   • Creatinine 08/02/2017 0.80  0.40 - 1.30 mg/dL Final   • Sodium 08/02/2017 143  134 - 146 mmol/L Final   • Potassium 08/02/2017 4.4  3.4 - 5.4 mmol/L Final   • Chloride 08/02/2017 102  100 - 112 mmol/L Final   • CO2 08/02/2017 31.0  20.0 - 32.0 mmol/L Final   • Calcium 08/02/2017 9.8  8.4 - 10.8 mg/dL Final   • Total Protein 08/02/2017 6.9  6.7 - 8.2 g/dL Final   • Albumin 08/02/2017 4.30  3.20 - 5.50 g/dL Final   • ALT (SGPT) 08/02/2017 15  10 - 60 U/L Final   • AST (SGOT) 08/02/2017 19  10 - 60 U/L Final   • Alkaline Phosphatase 08/02/2017 77  15 - 121 U/L Final   • Total Bilirubin 08/02/2017 0.6  0.2 - 1.0 mg/dL Final   • eGFR Non  Amer 08/02/2017 72  45 - 104 mL/min/1.73 Final   • Globulin 08/02/2017 2.6  2.5 - 4.6 gm/dL Final   • A/G Ratio 08/02/2017 1.7  1.0 - 3.0 g/dL Final   • BUN/Creatinine Ratio 08/02/2017 28.8* 7.0 - 25.0 Final   • Anion Gap 08/02/2017 10.0  5.0 - 15.0 mmol/L Final   • Hemoglobin A1C 08/02/2017 5.3  4 - 5.6 % Final   • Total Cholesterol 08/02/2017 192  150 - 200 mg/dL Final   • Triglycerides 08/02/2017 103  35 - 160 mg/dL Final   • HDL Cholesterol 08/02/2017 73  35 - 100 mg/dL Final   • LDL Cholesterol  08/02/2017 98  mg/dL Final   • VLDL Cholesterol 08/02/2017 20.6  mg/dL Final   • LDL/HDL Ratio 08/02/2017 1.35   Final   • Vitamin B-12 08/02/2017 897  239 - 931 pg/mL Final   ]

## 2017-09-05 ENCOUNTER — OFFICE VISIT (OUTPATIENT)
Dept: FAMILY MEDICINE CLINIC | Facility: CLINIC | Age: 62
End: 2017-09-05

## 2017-09-05 VITALS
HEIGHT: 60 IN | BODY MASS INDEX: 35.38 KG/M2 | DIASTOLIC BLOOD PRESSURE: 98 MMHG | HEART RATE: 80 BPM | SYSTOLIC BLOOD PRESSURE: 180 MMHG | WEIGHT: 180.2 LBS | TEMPERATURE: 98.2 F

## 2017-09-05 DIAGNOSIS — I10 ESSENTIAL HYPERTENSION: Primary | Chronic | ICD-10-CM

## 2017-09-05 DIAGNOSIS — R51.9 ACUTE NONINTRACTABLE HEADACHE, UNSPECIFIED HEADACHE TYPE: ICD-10-CM

## 2017-09-05 DIAGNOSIS — M17.0 PRIMARY OSTEOARTHRITIS OF BOTH KNEES: Chronic | ICD-10-CM

## 2017-09-05 PROCEDURE — 99213 OFFICE O/P EST LOW 20 MIN: CPT | Performed by: INTERNAL MEDICINE

## 2017-09-05 RX ORDER — AMLODIPINE BESYLATE 5 MG/1
5 TABLET ORAL DAILY
Qty: 30 TABLET | Refills: 11 | Status: SHIPPED | OUTPATIENT
Start: 2017-09-05 | End: 2018-09-25 | Stop reason: SDUPTHER

## 2017-09-05 RX ORDER — ATORVASTATIN CALCIUM 10 MG/1
10 TABLET, FILM COATED ORAL DAILY
Qty: 30 TABLET | Refills: 5 | Status: SHIPPED | OUTPATIENT
Start: 2017-09-05 | End: 2018-04-27 | Stop reason: SDUPTHER

## 2017-09-05 RX ORDER — SIMVASTATIN 20 MG
TABLET ORAL
Qty: 30 TABLET | Refills: 11 | Status: SHIPPED | OUTPATIENT
Start: 2017-09-05 | End: 2017-09-05 | Stop reason: ALTCHOICE

## 2017-09-05 RX ORDER — SIMVASTATIN 20 MG
TABLET ORAL
Qty: 30 TABLET | Refills: 12 | Status: CANCELLED | OUTPATIENT
Start: 2017-09-05

## 2017-09-05 NOTE — PROGRESS NOTES
"Subjective     Andria Goldstein is a 62 y.o. female.     History of Present Illness   Andria walked up to the window with her  today.  They report that her low pressure has been quite elevated for the past 3 or 4 days, with systolic blood pressures occasionally greater than 200 and diastolics occasionally greater than 100.  Her blood pressure has been at goal with her last 2 visits, both within the past month.  She denies sudden excessive anxiety.  She denies uncontrolled pain, but she has been having some arthritic left knee pain.  She denies noncompliance with her lisinopril HCT.  She denies excessive sodium intake.  She is obese, but her weight is stable.    On one occasion 3 or 4 days ago, her  gave her an extra half tablet of lisinopril HCT, and the systolic blood pressure dropped by approximately 20 points in 1-2 hours.    She reports a generalized headache for the past few days.  The headache seems to coincide with the days of increased blood pressure.  She denies any unilateral numbness or weakness.  She denies any mental status changes or visual changes.      Review of Systems   Constitutional: Negative for chills, fatigue and fever.   HENT: Negative for congestion, ear pain, postnasal drip, sinus pressure and sore throat.    Respiratory: Negative for cough, shortness of breath and wheezing.    Cardiovascular: Negative for chest pain, palpitations and leg swelling.   Gastrointestinal: Negative for abdominal pain, blood in stool, constipation, diarrhea, nausea and vomiting.   Endocrine: Negative for cold intolerance, heat intolerance, polydipsia and polyuria.   Genitourinary: Negative for dysuria, frequency, hematuria and urgency.   Skin: Negative for rash.   Neurological: Positive for headaches. Negative for syncope, facial asymmetry, weakness and numbness.       Objective     /98  Pulse 80  Temp 98.2 °F (36.8 °C) (Oral)   Ht 60\" (152.4 cm)  Wt 180 lb 3.2 oz (81.7 kg)  BMI " 35.19 kg/m2    Physical Exam   Constitutional: She is oriented to person, place, and time. She appears well-developed and well-nourished. No distress.   Accompanied by her .  Evidence of dementia, but no agitation   HENT:   Head: Normocephalic and atraumatic.   Nose: Right sinus exhibits no maxillary sinus tenderness and no frontal sinus tenderness. Left sinus exhibits no maxillary sinus tenderness and no frontal sinus tenderness.   Mouth/Throat: Uvula is midline, oropharynx is clear and moist and mucous membranes are normal. No oral lesions. No tonsillar exudate.   Eyes: Conjunctivae and EOM are normal. Pupils are equal, round, and reactive to light.   Neck: Trachea normal. Neck supple. No JVD present. Carotid bruit is not present. No tracheal deviation present. No thyroid mass and no thyromegaly present.   Cardiovascular: Normal rate, regular rhythm and normal heart sounds.   No extrasystoles are present. PMI is not displaced.    No murmur heard.  Pulmonary/Chest: Effort normal and breath sounds normal. No accessory muscle usage. No respiratory distress. She has no decreased breath sounds. She has no wheezes. She has no rhonchi. She has no rales.   Abdominal: Soft. Bowel sounds are normal. She exhibits no distension. There is no hepatosplenomegaly. There is no tenderness.   Obese abdomen limits exam       Vascular Status -  Her exam exhibits right foot vasculature normal. Her exam exhibits no right foot edema. Her exam exhibits left foot vasculature normal. Her exam exhibits no left foot edema.  Lymphadenopathy:     She has no cervical adenopathy.   Neurological: She is alert and oriented to person, place, and time. No cranial nerve deficit. Coordination normal.   No facial droop or slurred speech.   Skin: Skin is warm, dry and intact. No rash noted. No cyanosis. Nails show no clubbing.   Psychiatric: She has a normal mood and affect. Her speech is normal and behavior is normal. Thought content normal.    Vitals reviewed.      PHQ-2/PHQ-9 Depression Screening 8/9/2017   Little interest or pleasure in doing things 1   Feeling down, depressed, or hopeless 0   Trouble falling or staying asleep, or sleeping too much 2   Feeling tired or having little energy 2   Poor appetite or overeating 0   Feeling bad about yourself - or that you are a failure or have let yourself or your family down 0   Trouble concentrating on things, such as reading the newspaper or watching television 2   Moving or speaking so slowly that other people could have noticed. Or the opposite - being so fidgety or restless that you have been moving around a lot more than usual 0   Thoughts that you would be better off dead, or of hurting yourself in some way 0   Total Score 7   If you checked off any problems, how difficult have these problems made it for you to do your work, take care of things at home, or get along with other people? Not difficult at all       Assessment/Plan   Continue the lisinopril HCT every morning.  Start Norvasc 5 mg daily.  Pursue sodium restriction and weight loss.  Monitor blood pressure at home and notify me if not consistently at goal.    She states she thinks her blood pressure is running higher because her  is home currently due to a recent work-related accident.  Not sure if she was joking with that comment.    When the blood pressure is elevated, I would only use Tylenol products for her arthritic knees, avoiding the Mobic or other NSAIDs.  She may use tramadol as needed.    Due to starting Norvasc, we will need to change Zocor to Lipitor 10 mg daily      Diagnoses and all orders for this visit:    Essential hypertension    Acute nonintractable headache, unspecified headache type    Primary osteoarthritis of both knees    Other orders  -     simvastatin (ZOCOR) 20 MG tablet; Take one tab at bedtime for cholesterol  -     amLODIPine (NORVASC) 5 MG tablet; Take 1 tablet by mouth Daily.        No visits with  results within 3 Week(s) from this visit.  Latest known visit with results is:    Lab on 08/02/2017   Component Date Value Ref Range Status   • WBC 08/02/2017 6.82  3.20 - 9.80 10*3/mm3 Final   • RBC 08/02/2017 4.75  3.77 - 5.16 10*6/mm3 Final   • Hemoglobin 08/02/2017 13.9  12.0 - 15.5 g/dL Final   • Hematocrit 08/02/2017 42.8  35.0 - 45.0 % Final   • MCV 08/02/2017 90.1  80.0 - 98.0 fL Final   • MCH 08/02/2017 29.3  26.5 - 34.0 pg Final   • MCHC 08/02/2017 32.5  31.4 - 36.0 g/dL Final   • RDW 08/02/2017 13.0  11.5 - 14.5 % Final   • RDW-SD 08/02/2017 41.9  36.4 - 46.3 fl Final   • MPV 08/02/2017 13.0* 8.0 - 12.0 fL Final   • Platelets 08/02/2017 177  150 - 450 10*3/mm3 Final   • Neutrophil % 08/02/2017 65.5  37.0 - 80.0 % Final   • Lymphocyte % 08/02/2017 24.9  10.0 - 50.0 % Final   • Monocyte % 08/02/2017 6.7  0.0 - 12.0 % Final   • Eosinophil % 08/02/2017 2.6  0.0 - 7.0 % Final   • Basophil % 08/02/2017 0.3  0.0 - 2.0 % Final   • Neutrophils, Absolute 08/02/2017 4.46  2.00 - 8.60 10*3/mm3 Final   • Lymphocytes, Absolute 08/02/2017 1.70  0.60 - 4.20 10*3/mm3 Final   • Monocytes, Absolute 08/02/2017 0.46  0.00 - 0.90 10*3/mm3 Final   • Eosinophils, Absolute 08/02/2017 0.18  0.00 - 0.70 10*3/mm3 Final   • Basophils, Absolute 08/02/2017 0.02  0.00 - 0.20 10*3/mm3 Final   • Glucose 08/02/2017 116* 70 - 100 mg/dL Final   • BUN 08/02/2017 23  8 - 25 mg/dL Final   • Creatinine 08/02/2017 0.80  0.40 - 1.30 mg/dL Final   • Sodium 08/02/2017 143  134 - 146 mmol/L Final   • Potassium 08/02/2017 4.4  3.4 - 5.4 mmol/L Final   • Chloride 08/02/2017 102  100 - 112 mmol/L Final   • CO2 08/02/2017 31.0  20.0 - 32.0 mmol/L Final   • Calcium 08/02/2017 9.8  8.4 - 10.8 mg/dL Final   • Total Protein 08/02/2017 6.9  6.7 - 8.2 g/dL Final   • Albumin 08/02/2017 4.30  3.20 - 5.50 g/dL Final   • ALT (SGPT) 08/02/2017 15  10 - 60 U/L Final   • AST (SGOT) 08/02/2017 19  10 - 60 U/L Final   • Alkaline Phosphatase 08/02/2017 77  15 - 121 U/L  Final   • Total Bilirubin 08/02/2017 0.6  0.2 - 1.0 mg/dL Final   • eGFR Non  Amer 08/02/2017 72  45 - 104 mL/min/1.73 Final   • Globulin 08/02/2017 2.6  2.5 - 4.6 gm/dL Final   • A/G Ratio 08/02/2017 1.7  1.0 - 3.0 g/dL Final   • BUN/Creatinine Ratio 08/02/2017 28.8* 7.0 - 25.0 Final   • Anion Gap 08/02/2017 10.0  5.0 - 15.0 mmol/L Final   • Hemoglobin A1C 08/02/2017 5.3  4 - 5.6 % Final   • Total Cholesterol 08/02/2017 192  150 - 200 mg/dL Final   • Triglycerides 08/02/2017 103  35 - 160 mg/dL Final   • HDL Cholesterol 08/02/2017 73  35 - 100 mg/dL Final   • LDL Cholesterol  08/02/2017 98  mg/dL Final   • VLDL Cholesterol 08/02/2017 20.6  mg/dL Final   • LDL/HDL Ratio 08/02/2017 1.35   Final   • Vitamin B-12 08/02/2017 897  239 - 931 pg/mL Final   ]

## 2017-09-11 RX ORDER — TIZANIDINE 4 MG/1
TABLET ORAL
Qty: 30 TABLET | Refills: 2 | Status: SHIPPED | OUTPATIENT
Start: 2017-09-11 | End: 2018-04-23 | Stop reason: SDUPTHER

## 2017-10-18 RX ORDER — MEMANTINE HYDROCHLORIDE 10 MG/1
10 TABLET ORAL 2 TIMES DAILY
Qty: 60 TABLET | Refills: 11 | Status: SHIPPED | OUTPATIENT
Start: 2017-10-18 | End: 2019-09-30 | Stop reason: SDUPTHER

## 2017-10-18 RX ORDER — GABAPENTIN 800 MG/1
TABLET ORAL
Qty: 60 TABLET | Refills: 0 | Status: SHIPPED | OUTPATIENT
Start: 2017-10-18 | End: 2017-11-14 | Stop reason: SDUPTHER

## 2017-10-18 NOTE — TELEPHONE ENCOUNTER
Refill 1 month supply Gabapentin and has apt this month for [facility] refills[facility] performed[facility]

## 2017-10-23 ENCOUNTER — OFFICE VISIT (OUTPATIENT)
Dept: FAMILY MEDICINE CLINIC | Facility: CLINIC | Age: 62
End: 2017-10-23

## 2017-10-23 VITALS
TEMPERATURE: 98.1 F | WEIGHT: 182.2 LBS | HEIGHT: 60 IN | DIASTOLIC BLOOD PRESSURE: 78 MMHG | BODY MASS INDEX: 35.77 KG/M2 | SYSTOLIC BLOOD PRESSURE: 128 MMHG | HEART RATE: 80 BPM

## 2017-10-23 DIAGNOSIS — M54.16 LUMBAR RADICULOPATHY: Chronic | ICD-10-CM

## 2017-10-23 DIAGNOSIS — I10 ESSENTIAL HYPERTENSION: Chronic | ICD-10-CM

## 2017-10-23 DIAGNOSIS — G89.29 CHRONIC BILATERAL LOW BACK PAIN WITH LEFT-SIDED SCIATICA: Primary | Chronic | ICD-10-CM

## 2017-10-23 DIAGNOSIS — M54.42 CHRONIC BILATERAL LOW BACK PAIN WITH LEFT-SIDED SCIATICA: Primary | Chronic | ICD-10-CM

## 2017-10-23 DIAGNOSIS — M65.311 TRIGGER THUMB OF RIGHT HAND: Chronic | ICD-10-CM

## 2017-10-23 PROCEDURE — 99213 OFFICE O/P EST LOW 20 MIN: CPT | Performed by: INTERNAL MEDICINE

## 2017-10-23 NOTE — PROGRESS NOTES
"Subjective        History of Present Illness     Andria Goldstein is a 62 y.o. female here for evaluation of chronic bilateral lower lumbar back pain with left-sided sciatica and lumbar radiculopathy for which she continues on Gabapentin.  She denies no significant side effects including excessive sedation.  She is tolerating the gabapentin well and  understands the risks associated with this controlled medication, including tolerance and addiction.     She continues to have symptoms of trigger finger of right thumb, for which I offered orthopedic referral.  She declines today.         Review of Systems   Constitutional: Negative for chills, fatigue and fever.   HENT: Negative for congestion, ear pain, postnasal drip, sinus pressure and sore throat.    Respiratory: Negative for cough, shortness of breath and wheezing.    Cardiovascular: Negative for chest pain, palpitations and leg swelling.   Gastrointestinal: Negative for abdominal pain, blood in stool, constipation, diarrhea, nausea and vomiting.   Endocrine: Negative for cold intolerance, heat intolerance, polydipsia and polyuria.   Genitourinary: Negative for dysuria, frequency, hematuria and urgency.   Musculoskeletal: Positive for back pain.   Skin: Negative for rash.   Neurological: Negative for syncope and weakness.        Objective     Vitals:    10/23/17 1506   BP: 128/78   Pulse: 80   Temp: 98.1 °F (36.7 °C)   TempSrc: Oral   Weight: 182 lb 3.2 oz (82.6 kg)   Height: 60\" (152.4 cm)     Physical Exam   Constitutional: She is oriented to person, place, and time. She appears well-developed and well-nourished. No distress.   HENT:   Head: Normocephalic and atraumatic.   Nose: Nose normal.   Mouth/Throat: Oropharynx is clear and moist. No oropharyngeal exudate.   Eyes: EOM are normal. Pupils are equal, round, and reactive to light.   Neck: Neck supple. No JVD present. No thyromegaly present.   Cardiovascular: Normal rate, regular rhythm and normal heart " sounds.    Pulmonary/Chest: Effort normal and breath sounds normal. No accessory muscle usage. No respiratory distress. She has no wheezes. She has no rales.   Abdominal: Soft. Bowel sounds are normal. She exhibits no distension. There is no tenderness.   Musculoskeletal: She exhibits no edema.   Right thumb is wrapped with tape/bandage   Lymphadenopathy:     She has no cervical adenopathy.   Neurological: She is alert and oriented to person, place, and time. No cranial nerve deficit.   Psychiatric: She has a normal mood and affect. Her speech is normal and behavior is normal.       Future Appointments  Date Time Provider Department Center   2/13/2018 9:30 AM Christofer Mckeon MD [facility] None     Assessment/Plan       Her gabapentin 800 mg is refilled.  Patient understands the risks associated with this controlled medication, including tolerance and addiction.  She also agrees to only obtain this medication from me, and not from a another provider, unless that provider is covering for me in my absence.  She also agrees to be compliant in dosing, and not self adjust the dose of medication.  A signed controlled substance agreement is on file, and she has received a controlled substance education sheet at this a previous visit.  She has also signed a consent for treatment with a controlled substance as per [facility] policy. KARO was obtained.    Her blood pressure control is much better than last month.  Continue the lisinopril HCT and Norvasc.    She declines referral for evaluation and management of the trigger thumb.  She will let me know if she changes her mind.      Scribed for Dr. Mckeon by Cesilia Gutierrez [facility].     Diagnoses and all orders for this visit:    Chronic bilateral low back pain with left-sided sciatica    Lumbar radiculopathy    Essential hypertension      No visits with results within 3 Week(s) from this visit.  Latest known visit with results is:    Lab on 08/02/2017   Component Date Value  Ref Range Status   • WBC 08/02/2017 6.82  3.20 - 9.80 10*3/mm3 Final   • RBC 08/02/2017 4.75  3.77 - 5.16 10*6/mm3 Final   • Hemoglobin 08/02/2017 13.9  12.0 - 15.5 g/dL Final   • Hematocrit 08/02/2017 42.8  35.0 - 45.0 % Final   • MCV 08/02/2017 90.1  80.0 - 98.0 fL Final   • MCH 08/02/2017 29.3  26.5 - 34.0 pg Final   • MCHC 08/02/2017 32.5  31.4 - 36.0 g/dL Final   • RDW 08/02/2017 13.0  11.5 - 14.5 % Final   • RDW-SD 08/02/2017 41.9  36.4 - 46.3 fl Final   • MPV 08/02/2017 13.0* 8.0 - 12.0 fL Final   • Platelets 08/02/2017 177  150 - 450 10*3/mm3 Final   • Neutrophil % 08/02/2017 65.5  37.0 - 80.0 % Final   • Lymphocyte % 08/02/2017 24.9  10.0 - 50.0 % Final   • Monocyte % 08/02/2017 6.7  0.0 - 12.0 % Final   • Eosinophil % 08/02/2017 2.6  0.0 - 7.0 % Final   • Basophil % 08/02/2017 0.3  0.0 - 2.0 % Final   • Neutrophils, Absolute 08/02/2017 4.46  2.00 - 8.60 10*3/mm3 Final   • Lymphocytes, Absolute 08/02/2017 1.70  0.60 - 4.20 10*3/mm3 Final   • Monocytes, Absolute 08/02/2017 0.46  0.00 - 0.90 10*3/mm3 Final   • Eosinophils, Absolute 08/02/2017 0.18  0.00 - 0.70 10*3/mm3 Final   • Basophils, Absolute 08/02/2017 0.02  0.00 - 0.20 10*3/mm3 Final   • Glucose 08/02/2017 116* 70 - 100 mg/dL Final   • BUN 08/02/2017 23  8 - 25 mg/dL Final   • Creatinine 08/02/2017 0.80  0.40 - 1.30 mg/dL Final   • Sodium 08/02/2017 143  134 - 146 mmol/L Final   • Potassium 08/02/2017 4.4  3.4 - 5.4 mmol/L Final   • Chloride 08/02/2017 102  100 - 112 mmol/L Final   • CO2 08/02/2017 31.0  20.0 - 32.0 mmol/L Final   • Calcium 08/02/2017 9.8  8.4 - 10.8 mg/dL Final   • Total Protein 08/02/2017 6.9  6.7 - 8.2 g/dL Final   • Albumin 08/02/2017 4.30  3.20 - 5.50 g/dL Final   • ALT (SGPT) 08/02/2017 15  10 - 60 U/L Final   • AST (SGOT) 08/02/2017 19  10 - 60 U/L Final   • Alkaline Phosphatase 08/02/2017 77  15 - 121 U/L Final   • Total Bilirubin 08/02/2017 0.6  0.2 - 1.0 mg/dL Final   • eGFR Non  Amer 08/02/2017 72  45 - 104  mL/min/1.73 Final   • Globulin 08/02/2017 2.6  2.5 - 4.6 gm/dL Final   • A/G Ratio 08/02/2017 1.7  1.0 - 3.0 g/dL Final   • BUN/Creatinine Ratio 08/02/2017 28.8* 7.0 - 25.0 Final   • Anion Gap 08/02/2017 10.0  5.0 - 15.0 mmol/L Final   • Hemoglobin A1C 08/02/2017 5.3  4 - 5.6 % Final   • Total Cholesterol 08/02/2017 192  150 - 200 mg/dL Final   • Triglycerides 08/02/2017 103  35 - 160 mg/dL Final   • HDL Cholesterol 08/02/2017 73  35 - 100 mg/dL Final   • LDL Cholesterol  08/02/2017 98  mg/dL Final   • VLDL Cholesterol 08/02/2017 20.6  mg/dL Final   • LDL/HDL Ratio 08/02/2017 1.35   Final   • Vitamin B-12 08/02/2017 897  239 - 931 pg/mL Final   ]

## 2017-11-14 RX ORDER — GABAPENTIN 800 MG/1
TABLET ORAL
Qty: 60 TABLET | Refills: 4 | Status: SHIPPED | OUTPATIENT
Start: 2017-11-14 | End: 2018-05-31 | Stop reason: SDUPTHER

## 2017-11-14 NOTE — TELEPHONE ENCOUNTER
Gabapentin and Zanaflex refill. She got a month supply last time so will get 4 refills.  Breezy performed. TP

## 2017-11-16 RX ORDER — VENLAFAXINE HYDROCHLORIDE 150 MG/1
150 CAPSULE, EXTENDED RELEASE ORAL DAILY
Qty: 30 CAPSULE | Refills: 11 | Status: SHIPPED | OUTPATIENT
Start: 2017-11-16 | End: 2018-05-30 | Stop reason: SDUPTHER

## 2018-01-15 RX ORDER — TIZANIDINE 4 MG/1
TABLET ORAL
Qty: 30 TABLET | Refills: 2 | Status: SHIPPED | OUTPATIENT
Start: 2018-01-15 | End: 2018-02-13 | Stop reason: SDUPTHER

## 2018-02-06 ENCOUNTER — LAB (OUTPATIENT)
Dept: LAB | Facility: OTHER | Age: 63
End: 2018-02-06

## 2018-02-06 DIAGNOSIS — D53.1 MEGALOBLASTIC ANEMIA DUE TO VITAMIN B12 DEFICIENCY: Primary | Chronic | ICD-10-CM

## 2018-02-06 DIAGNOSIS — R73.01 IMPAIRED FASTING GLUCOSE: ICD-10-CM

## 2018-02-06 DIAGNOSIS — I10 ESSENTIAL HYPERTENSION: Chronic | ICD-10-CM

## 2018-02-06 DIAGNOSIS — E78.2 MIXED HYPERLIPIDEMIA: Chronic | ICD-10-CM

## 2018-02-06 DIAGNOSIS — D53.1 MEGALOBLASTIC ANEMIA DUE TO VITAMIN B12 DEFICIENCY: ICD-10-CM

## 2018-02-06 LAB
ALBUMIN SERPL-MCNC: 4.1 G/DL (ref 3.2–5.5)
ALBUMIN/GLOB SERPL: 1.5 G/DL (ref 1–3)
ALP SERPL-CCNC: 81 U/L (ref 15–121)
ALT SERPL W P-5'-P-CCNC: 16 U/L (ref 10–60)
ANION GAP SERPL CALCULATED.3IONS-SCNC: 8 MMOL/L (ref 5–15)
AST SERPL-CCNC: 21 U/L (ref 10–60)
BASOPHILS # BLD AUTO: 0.03 10*3/MM3 (ref 0–0.2)
BASOPHILS NFR BLD AUTO: 0.5 % (ref 0–2)
BILIRUB SERPL-MCNC: 0.7 MG/DL (ref 0.2–1)
BUN BLD-MCNC: 18 MG/DL (ref 8–25)
BUN/CREAT SERPL: 22.5 (ref 7–25)
CALCIUM SPEC-SCNC: 9.5 MG/DL (ref 8.4–10.8)
CHLORIDE SERPL-SCNC: 107 MMOL/L (ref 100–112)
CHOLEST SERPL-MCNC: 211 MG/DL (ref 150–200)
CO2 SERPL-SCNC: 28 MMOL/L (ref 20–32)
CREAT BLD-MCNC: 0.8 MG/DL (ref 0.4–1.3)
DEPRECATED RDW RBC AUTO: 42 FL (ref 36.4–46.3)
EOSINOPHIL # BLD AUTO: 0.17 10*3/MM3 (ref 0–0.7)
EOSINOPHIL NFR BLD AUTO: 2.9 % (ref 0–7)
ERYTHROCYTE [DISTWIDTH] IN BLOOD BY AUTOMATED COUNT: 13.5 % (ref 11.5–14.5)
GFR SERPL CREATININE-BSD FRML MDRD: 72 ML/MIN/1.73 (ref 45–104)
GLOBULIN UR ELPH-MCNC: 2.7 GM/DL (ref 2.5–4.6)
GLUCOSE BLD-MCNC: 109 MG/DL (ref 70–100)
HBA1C MFR BLD: 5.4 % (ref 4–5.6)
HCT VFR BLD AUTO: 40.5 % (ref 35–45)
HDLC SERPL-MCNC: 73 MG/DL (ref 35–100)
HGB BLD-MCNC: 13.2 G/DL (ref 12–15.5)
LDLC SERPL CALC-MCNC: 111 MG/DL
LDLC/HDLC SERPL: 1.52 {RATIO}
LYMPHOCYTES # BLD AUTO: 1.11 10*3/MM3 (ref 0.6–4.2)
LYMPHOCYTES NFR BLD AUTO: 19.2 % (ref 10–50)
MCH RBC QN AUTO: 28.6 PG (ref 26.5–34)
MCHC RBC AUTO-ENTMCNC: 32.6 G/DL (ref 31.4–36)
MCV RBC AUTO: 87.7 FL (ref 80–98)
MONOCYTES # BLD AUTO: 0.4 10*3/MM3 (ref 0–0.9)
MONOCYTES NFR BLD AUTO: 6.9 % (ref 0–12)
NEUTROPHILS # BLD AUTO: 4.06 10*3/MM3 (ref 2–8.6)
NEUTROPHILS NFR BLD AUTO: 70.5 % (ref 37–80)
PLATELET # BLD AUTO: 187 10*3/MM3 (ref 150–450)
PMV BLD AUTO: 11.7 FL (ref 8–12)
POTASSIUM BLD-SCNC: 3.8 MMOL/L (ref 3.4–5.4)
PROT SERPL-MCNC: 6.8 G/DL (ref 6.7–8.2)
RBC # BLD AUTO: 4.62 10*6/MM3 (ref 3.77–5.16)
SODIUM BLD-SCNC: 143 MMOL/L (ref 134–146)
TRIGL SERPL-MCNC: 135 MG/DL (ref 35–160)
VIT B12 BLD-MCNC: 913 PG/ML (ref 239–931)
VLDLC SERPL-MCNC: 27 MG/DL
WBC NRBC COR # BLD: 5.77 10*3/MM3 (ref 3.2–9.8)

## 2018-02-06 PROCEDURE — 36415 COLL VENOUS BLD VENIPUNCTURE: CPT | Performed by: INTERNAL MEDICINE

## 2018-02-06 PROCEDURE — 80061 LIPID PANEL: CPT | Performed by: INTERNAL MEDICINE

## 2018-02-06 PROCEDURE — 85025 COMPLETE CBC W/AUTO DIFF WBC: CPT | Performed by: INTERNAL MEDICINE

## 2018-02-06 PROCEDURE — 80053 COMPREHEN METABOLIC PANEL: CPT | Performed by: INTERNAL MEDICINE

## 2018-02-06 PROCEDURE — 83036 HEMOGLOBIN GLYCOSYLATED A1C: CPT | Performed by: INTERNAL MEDICINE

## 2018-02-06 PROCEDURE — 82607 VITAMIN B-12: CPT | Performed by: INTERNAL MEDICINE

## 2018-02-12 NOTE — PROGRESS NOTES
Subjective        History of Present Illness     Andria Goldstein is a 62 y.o. female who presents for 6-month follow up on hyperlipidemia, hypertension, cerebrovascular disease, apparent Alzheimer's dementia, chronic diarrhea of undetermined etiology, impaired fasting glucose and vitamin B-12 deficiency among other issues.  She continues on Neurontin for chronic bilateral lower lumbar back pain with left-sided sciatica and lumbar radiculopathy.      Six months ago, I started her on cholestyramine for the frequent episodes of loose stool. She could not afford the  $200 co-pay on the Xifaxan recommended by Dr. Perkins.  She reports that the cholestyramine was not effective, but also reports that the diarrhea has dramatically improved for the past several months.  She now only averages diarrhea 1-2 times weekly, and feels that it is not a problem currently.  It seems that there may be a functional component, as her symptoms are better now that her nerves are better.        We added Norvasc to her lisinopril HCT last fall.  Due to starting Norvasc, we also changed Zocor to Lipitor 10 mg daily.  Her blood pressure is still above goal.  She is compliant with the medications.  We discussed adding Coreg.  Her lipids are a little higher on the low-dose Lipitor, but she reports dietary noncompliance and she has gained 10 pounds in the past 4 months.    Reports episodic right ear pain/pressure.  She has chronic postnasal drip.  She is describing some episodic symptoms of eustachian tube dysfunction, but not vertigo.    She continues on Aricept 5 mg daily and Namenda 10 mg for dementia.  I referred her to Dr. Dee last year for neuropsychiatric evaluation. Nicol Dee felt she met criteria for dementia, but he was not convinced this was Alzheimer's as the pattern noted by testing raisied the possibility for cerebrovascular disease with associated depression. Results of MRI obtained 05/25/17 due to concerns of memory  "loss revealed no evidence intracranial abnormality, but did reveal small vessel ischemic changes, which could explain some of her concerns with mild memory loss.  She and her  feel that her memory symptoms are stable.  They also feel that her anxiety and depression symptoms are stable with the current dose of Effexor XR.     DEXA 09/2016 revealed normal bone density.       The patient's relevant past medical, surgical, and social history was reviewed in Epic.   Lab results are reviewed with the patient today.  CBC unremarkable.   Fasting glucose 109. A1c is 5.4.  Liver and renal function normal.  Vitamin B-12 is 913.  Total cholesterol 211.  .  HDL 73.  Triglycerides 135.      Review of Systems   Constitutional: Negative for chills, fatigue and fever.   HENT: Positive for ear pain. Negative for congestion, postnasal drip, sinus pressure and sore throat.    Respiratory: Negative for cough, shortness of breath and wheezing.    Cardiovascular: Negative for chest pain, palpitations and leg swelling.   Gastrointestinal: Positive for diarrhea. Negative for abdominal pain, blood in stool, constipation, nausea and vomiting.   Endocrine: Negative for cold intolerance, heat intolerance, polydipsia and polyuria.   Genitourinary: Negative for dysuria, frequency, hematuria and urgency.   Skin: Negative for rash.   Neurological: Negative for syncope and weakness.        Objective     /88  Pulse 68  Temp 97.9 °F (36.6 °C) (Oral)   Ht 152.4 cm (60\")  Wt 86.5 kg (190 lb 12.8 oz)  BMI 37.26 kg/m2    Physical Exam   Constitutional: She is oriented to person, place, and time. She appears well-developed and well-nourished. No distress.   HENT:   Head: Normocephalic and atraumatic.   Nose: Right sinus exhibits no maxillary sinus tenderness and no frontal sinus tenderness. Left sinus exhibits no maxillary sinus tenderness and no frontal sinus tenderness.   Mouth/Throat: Uvula is midline, oropharynx is clear and " moist and mucous membranes are normal. No oral lesions. No tonsillar exudate.   Eyes: Conjunctivae and EOM are normal. Pupils are equal, round, and reactive to light.   Neck: Trachea normal. Neck supple. No JVD present. Carotid bruit is not present. No tracheal deviation present. No thyroid mass and no thyromegaly present.   Cardiovascular: Normal rate, regular rhythm, normal heart sounds and intact distal pulses.   No extrasystoles are present. PMI is not displaced.    No murmur heard.  Pulmonary/Chest: Effort normal and breath sounds normal. No accessory muscle usage. No respiratory distress. She has no decreased breath sounds. She has no wheezes. She has no rhonchi. She has no rales.   Abdominal: Soft. Bowel sounds are normal. She exhibits no distension. There is no hepatosplenomegaly. There is no tenderness.   Obese abdomen       Vascular Status -  Her exam exhibits right foot vasculature normal. Her exam exhibits no right foot edema. Her exam exhibits left foot vasculature normal. Her exam exhibits no left foot edema.  Lymphadenopathy:     She has no cervical adenopathy.   Neurological: She is alert and oriented to person, place, and time. No cranial nerve deficit. Coordination normal.   Skin: Skin is warm, dry and intact. No rash noted. No cyanosis. Nails show no clubbing.   Psychiatric: She has a normal mood and affect. Her speech is normal and behavior is normal. Judgment and thought content normal.   Vitals reviewed.          Assessment/Plan      Continue the Norvasc and lisinopril HCT.  Start Coreg 6.25 mg twice a day.  Pursue aggressive sodium restriction and weight loss.  Monitor blood pressure at home and notify me if not at goal.    Continue Lipitor 10 mg daily.  We will consider increasing the dose in the near future if her LDL does not get fully to goal.    Intensify efforts at a diabetic diet, exercise, and weight loss plan in this patient with impaired fasting glucose.    Continue the Namenda and  Aricept.  I have not maxed out the Aricept due to her GI issues with episodic diarrhea.    Continue the vitamin B12 supplement daily.    Continue the current vascular risk factor modifications and daily aspirin.    Pursue diet, exercise, and weight loss efforts.  Written literature regarding diet and exercise are included in patient's AVS today.        Continue with combination of Aricept and Namenda.      Continue other medications and vitamin and mineral supplements to treat additional medical problems which we addressed today.      She will return in six months for follow up with fasting labs one week prior.          Diagnoses and all orders for this visit:    Mixed hyperlipidemia  -     Comprehensive Metabolic Panel; Future  -     CBC Auto Differential; Future  -     Lipid Panel; Future  -     Vitamin B12; Future  -     TSH; Future    Essential hypertension  -     Comprehensive Metabolic Panel; Future  -     CBC Auto Differential; Future    Cerebrovascular disease    Class 2 obesity due to excess calories with serious comorbidity and body mass index (BMI) of 37.0 to 37.9 in adult    Postcholecystectomy diarrhea    Gastroesophageal reflux disease without esophagitis    Impaired fasting glucose  -     Hemoglobin A1c; Future    Early onset Alzheimer's dementia without behavioral disturbance    Megaloblastic anemia due to vitamin B12 deficiency  -     Vitamin B12; Future    Generalized anxiety disorder    Recurrent major depressive disorder, in partial remission    Dysfunction of right eustachian tube    Other orders  -     carvedilol (COREG) 6.25 MG tablet; Take 1 tablet by mouth 2 (Two) Times a Day With Meals.  -     fluticasone (FLONASE) 50 MCG/ACT nasal spray; 1 spray into each nostril 2 (Two) Times a Day. Administer 1 spray in each nostril twice daily.      Lab on 02/06/2018   Component Date Value Ref Range Status   • Glucose 02/06/2018 109* 70 - 100 mg/dL Final   • BUN 02/06/2018 18  8 - 25 mg/dL Final   •  Creatinine 02/06/2018 0.80  0.40 - 1.30 mg/dL Final   • Sodium 02/06/2018 143  134 - 146 mmol/L Final   • Potassium 02/06/2018 3.8  3.4 - 5.4 mmol/L Final   • Chloride 02/06/2018 107  100 - 112 mmol/L Final   • CO2 02/06/2018 28.0  20.0 - 32.0 mmol/L Final   • Calcium 02/06/2018 9.5  8.4 - 10.8 mg/dL Final   • Total Protein 02/06/2018 6.8  6.7 - 8.2 g/dL Final   • Albumin 02/06/2018 4.10  3.20 - 5.50 g/dL Final   • ALT (SGPT) 02/06/2018 16  10 - 60 U/L Final   • AST (SGOT) 02/06/2018 21  10 - 60 U/L Final   • Alkaline Phosphatase 02/06/2018 81  15 - 121 U/L Final   • Total Bilirubin 02/06/2018 0.7  0.2 - 1.0 mg/dL Final   • eGFR Non  Amer 02/06/2018 72  45 - 104 mL/min/1.73 Final   • Globulin 02/06/2018 2.7  2.5 - 4.6 gm/dL Final   • A/G Ratio 02/06/2018 1.5  1.0 - 3.0 g/dL Final   • BUN/Creatinine Ratio 02/06/2018 22.5  7.0 - 25.0 Final   • Anion Gap 02/06/2018 8.0  5.0 - 15.0 mmol/L Final   • Total Cholesterol 02/06/2018 211* 150 - 200 mg/dL Final   • Triglycerides 02/06/2018 135  35 - 160 mg/dL Final   • HDL Cholesterol 02/06/2018 73  35 - 100 mg/dL Final   • LDL Cholesterol  02/06/2018 111  mg/dL Final   • VLDL Cholesterol 02/06/2018 27  mg/dL Final   • LDL/HDL Ratio 02/06/2018 1.52   Final   • Hemoglobin A1C 02/06/2018 5.4  4 - 5.6 % Final   • Vitamin B-12 02/06/2018 913  239 - 931 pg/mL Final   • WBC 02/06/2018 5.77  3.20 - 9.80 10*3/mm3 Final   • RBC 02/06/2018 4.62  3.77 - 5.16 10*6/mm3 Final   • Hemoglobin 02/06/2018 13.2  12.0 - 15.5 g/dL Final   • Hematocrit 02/06/2018 40.5  35.0 - 45.0 % Final   • MCV 02/06/2018 87.7  80.0 - 98.0 fL Final   • MCH 02/06/2018 28.6  26.5 - 34.0 pg Final   • MCHC 02/06/2018 32.6  31.4 - 36.0 g/dL Final   • RDW 02/06/2018 13.5  11.5 - 14.5 % Final   • RDW-SD 02/06/2018 42.0  36.4 - 46.3 fl Final   • MPV 02/06/2018 11.7  8.0 - 12.0 fL Final   • Platelets 02/06/2018 187  150 - 450 10*3/mm3 Final   • Neutrophil % 02/06/2018 70.5  37.0 - 80.0 % Final   • Lymphocyte %  02/06/2018 19.2  10.0 - 50.0 % Final   • Monocyte % 02/06/2018 6.9  0.0 - 12.0 % Final   • Eosinophil % 02/06/2018 2.9  0.0 - 7.0 % Final   • Basophil % 02/06/2018 0.5  0.0 - 2.0 % Final   • Neutrophils, Absolute 02/06/2018 4.06  2.00 - 8.60 10*3/mm3 Final   • Lymphocytes, Absolute 02/06/2018 1.11  0.60 - 4.20 10*3/mm3 Final   • Monocytes, Absolute 02/06/2018 0.40  0.00 - 0.90 10*3/mm3 Final   • Eosinophils, Absolute 02/06/2018 0.17  0.00 - 0.70 10*3/mm3 Final   • Basophils, Absolute 02/06/2018 0.03  0.00 - 0.20 10*3/mm3 Final   ]

## 2018-02-13 ENCOUNTER — OFFICE VISIT (OUTPATIENT)
Dept: FAMILY MEDICINE CLINIC | Facility: CLINIC | Age: 63
End: 2018-02-13

## 2018-02-13 VITALS
BODY MASS INDEX: 37.46 KG/M2 | TEMPERATURE: 97.9 F | DIASTOLIC BLOOD PRESSURE: 88 MMHG | WEIGHT: 190.8 LBS | SYSTOLIC BLOOD PRESSURE: 154 MMHG | HEART RATE: 68 BPM | HEIGHT: 60 IN

## 2018-02-13 DIAGNOSIS — D53.1 MEGALOBLASTIC ANEMIA DUE TO VITAMIN B12 DEFICIENCY: Chronic | ICD-10-CM

## 2018-02-13 DIAGNOSIS — R19.7 POSTCHOLECYSTECTOMY DIARRHEA: Chronic | ICD-10-CM

## 2018-02-13 DIAGNOSIS — IMO0001 CLASS 2 OBESITY DUE TO EXCESS CALORIES WITH SERIOUS COMORBIDITY AND BODY MASS INDEX (BMI) OF 37.0 TO 37.9 IN ADULT: Chronic | ICD-10-CM

## 2018-02-13 DIAGNOSIS — I10 ESSENTIAL HYPERTENSION: Chronic | ICD-10-CM

## 2018-02-13 DIAGNOSIS — K21.9 GASTROESOPHAGEAL REFLUX DISEASE WITHOUT ESOPHAGITIS: Chronic | ICD-10-CM

## 2018-02-13 DIAGNOSIS — H69.81 DYSFUNCTION OF RIGHT EUSTACHIAN TUBE: Chronic | ICD-10-CM

## 2018-02-13 DIAGNOSIS — I67.9 CEREBROVASCULAR DISEASE: Chronic | ICD-10-CM

## 2018-02-13 DIAGNOSIS — G30.0 EARLY ONSET ALZHEIMER'S DEMENTIA WITHOUT BEHAVIORAL DISTURBANCE (HCC): Chronic | ICD-10-CM

## 2018-02-13 DIAGNOSIS — F41.1 GENERALIZED ANXIETY DISORDER: Chronic | ICD-10-CM

## 2018-02-13 DIAGNOSIS — E78.2 MIXED HYPERLIPIDEMIA: Primary | Chronic | ICD-10-CM

## 2018-02-13 DIAGNOSIS — K91.89 POSTCHOLECYSTECTOMY DIARRHEA: Chronic | ICD-10-CM

## 2018-02-13 DIAGNOSIS — R73.01 IMPAIRED FASTING GLUCOSE: Chronic | ICD-10-CM

## 2018-02-13 DIAGNOSIS — F33.41 RECURRENT MAJOR DEPRESSIVE DISORDER, IN PARTIAL REMISSION (HCC): Chronic | ICD-10-CM

## 2018-02-13 DIAGNOSIS — F02.80 EARLY ONSET ALZHEIMER'S DEMENTIA WITHOUT BEHAVIORAL DISTURBANCE (HCC): Chronic | ICD-10-CM

## 2018-02-13 PROCEDURE — 99214 OFFICE O/P EST MOD 30 MIN: CPT | Performed by: INTERNAL MEDICINE

## 2018-02-13 RX ORDER — CARVEDILOL 6.25 MG/1
6.25 TABLET ORAL 2 TIMES DAILY WITH MEALS
Qty: 60 TABLET | Refills: 11 | Status: SHIPPED | OUTPATIENT
Start: 2018-02-13 | End: 2019-02-21 | Stop reason: SDUPTHER

## 2018-02-13 RX ORDER — FLUTICASONE PROPIONATE 50 MCG
1 SPRAY, SUSPENSION (ML) NASAL 2 TIMES DAILY
Qty: 1 BOTTLE | Refills: 11 | Status: SHIPPED | OUTPATIENT
Start: 2018-02-13 | End: 2018-12-18 | Stop reason: SDUPTHER

## 2018-04-09 RX ORDER — LISINOPRIL AND HYDROCHLOROTHIAZIDE 20; 12.5 MG/1; MG/1
1 TABLET ORAL EVERY MORNING
Qty: 30 TABLET | Refills: 11 | Status: SHIPPED | OUTPATIENT
Start: 2018-04-09 | End: 2019-03-20 | Stop reason: SDUPTHER

## 2018-04-20 RX ORDER — OMEPRAZOLE 40 MG/1
CAPSULE, DELAYED RELEASE ORAL
Qty: 30 CAPSULE | Refills: 13 | Status: CANCELLED | OUTPATIENT
Start: 2018-04-20

## 2018-04-23 RX ORDER — OMEPRAZOLE 40 MG/1
40 CAPSULE, DELAYED RELEASE ORAL EVERY MORNING
Qty: 30 CAPSULE | Refills: 11 | Status: SHIPPED | OUTPATIENT
Start: 2018-04-23 | End: 2019-05-01 | Stop reason: SDUPTHER

## 2018-04-23 RX ORDER — TIZANIDINE 4 MG/1
TABLET ORAL
Qty: 30 TABLET | Refills: 2 | Status: SHIPPED | OUTPATIENT
Start: 2018-04-23 | End: 2018-08-02 | Stop reason: SDUPTHER

## 2018-04-23 RX ORDER — TIZANIDINE 4 MG/1
TABLET ORAL
Qty: 30 TABLET | Refills: 2 | OUTPATIENT
Start: 2018-04-23

## 2018-04-27 RX ORDER — ATORVASTATIN CALCIUM 10 MG/1
10 TABLET, FILM COATED ORAL DAILY
Qty: 30 TABLET | Refills: 5 | Status: SHIPPED | OUTPATIENT
Start: 2018-04-27 | End: 2018-07-25 | Stop reason: SDUPTHER

## 2018-05-30 RX ORDER — VENLAFAXINE HYDROCHLORIDE 150 MG/1
150 CAPSULE, EXTENDED RELEASE ORAL DAILY
Qty: 30 CAPSULE | Refills: 11 | Status: SHIPPED | OUTPATIENT
Start: 2018-05-30 | End: 2019-06-18 | Stop reason: SDUPTHER

## 2018-05-31 ENCOUNTER — OFFICE VISIT (OUTPATIENT)
Dept: FAMILY MEDICINE CLINIC | Facility: CLINIC | Age: 63
End: 2018-05-31

## 2018-05-31 VITALS
SYSTOLIC BLOOD PRESSURE: 130 MMHG | BODY MASS INDEX: 36.2 KG/M2 | HEART RATE: 72 BPM | WEIGHT: 184.4 LBS | TEMPERATURE: 97.6 F | DIASTOLIC BLOOD PRESSURE: 80 MMHG | HEIGHT: 60 IN

## 2018-05-31 DIAGNOSIS — F33.41 RECURRENT MAJOR DEPRESSIVE DISORDER, IN PARTIAL REMISSION (HCC): Chronic | ICD-10-CM

## 2018-05-31 DIAGNOSIS — IMO0001 CLASS 2 OBESITY DUE TO EXCESS CALORIES WITH SERIOUS COMORBIDITY AND BODY MASS INDEX (BMI) OF 37.0 TO 37.9 IN ADULT: Chronic | ICD-10-CM

## 2018-05-31 DIAGNOSIS — M54.42 CHRONIC BILATERAL LOW BACK PAIN WITH LEFT-SIDED SCIATICA: Chronic | ICD-10-CM

## 2018-05-31 DIAGNOSIS — G30.0 EARLY ONSET ALZHEIMER'S DEMENTIA WITHOUT BEHAVIORAL DISTURBANCE (HCC): Chronic | ICD-10-CM

## 2018-05-31 DIAGNOSIS — I10 ESSENTIAL HYPERTENSION: Chronic | ICD-10-CM

## 2018-05-31 DIAGNOSIS — F02.80 EARLY ONSET ALZHEIMER'S DEMENTIA WITHOUT BEHAVIORAL DISTURBANCE (HCC): Chronic | ICD-10-CM

## 2018-05-31 DIAGNOSIS — G89.29 CHRONIC BILATERAL LOW BACK PAIN WITH LEFT-SIDED SCIATICA: Chronic | ICD-10-CM

## 2018-05-31 DIAGNOSIS — M54.16 LUMBAR RADICULOPATHY: Primary | Chronic | ICD-10-CM

## 2018-05-31 DIAGNOSIS — M53.86 DISORDER OF LUMBAR SPINE: Chronic | ICD-10-CM

## 2018-05-31 PROCEDURE — 99214 OFFICE O/P EST MOD 30 MIN: CPT | Performed by: INTERNAL MEDICINE

## 2018-05-31 RX ORDER — GABAPENTIN 800 MG/1
TABLET ORAL
Qty: 60 TABLET | Refills: 5 | Status: SHIPPED | OUTPATIENT
Start: 2018-05-31 | End: 2018-12-18 | Stop reason: SDUPTHER

## 2018-05-31 NOTE — PROGRESS NOTES
Subjective     Andria Goldstein is a 63 y.o. female.     History of Present Illness     Andria is here for follow-up of chronic low back pain with left lower extremity radiculopathy, among other issues.  She has been taking Mobic and Neurontin for pain control.  She has been out of her Neurontin for a little while and the pain has been worse.  We discussed how to gradually get back on the full dose of Neurontin.  She reports reasonable pain control when she is taking these 2 medications regularly.  She had a bone density recently and she thought she understood the radiology technician to say she saw evidence of a loose pedicle screw in her lumbar spine.  They are wondering if this could be causing or aggravating her low back pain.  We are going to get x-rays today to further evaluate this.    Her blood pressure is well controlled with the current dose of Norvasc and lisinopril HCT.  Her weight is fairly stable, but still far above goal.      She is not meeting her diet, activity, or weight loss goals.  Back pain prevents lots of physical activity or regular exercise.    He continues on Aricept and Namenda for dementia.  He continues on Effexor XR for anxiety and depression.  Her  assists as her caregiver.  Her symptoms seem to be relatively stable.  There is some obvious slowing of cognitive function.    Review of Systems   Constitutional: Negative for chills, fatigue and fever.   HENT: Negative for congestion, ear pain, postnasal drip, sinus pressure and sore throat.    Respiratory: Negative for cough, shortness of breath and wheezing.    Cardiovascular: Negative for chest pain, palpitations and leg swelling.   Gastrointestinal: Negative for abdominal pain, blood in stool, constipation, diarrhea, nausea and vomiting.   Endocrine: Negative for cold intolerance, heat intolerance, polydipsia and polyuria.   Genitourinary: Negative for dysuria, frequency, hematuria and urgency.   Musculoskeletal: Positive  "for back pain.   Skin: Negative for rash.   Neurological: Negative for syncope and weakness.   Psychiatric/Behavioral: Positive for dysphoric mood.       Objective     /80   Pulse 72   Temp 97.6 °F (36.4 °C) (Oral)   Ht 152.4 cm (60\")   Wt 83.6 kg (184 lb 6.4 oz)   BMI 36.01 kg/m²     Physical Exam   Constitutional: She is oriented to person, place, and time. She appears well-developed and well-nourished. No distress.   HENT:   Head: Normocephalic and atraumatic.   Nose: Nose normal.   Mouth/Throat: Oropharynx is clear and moist. No oropharyngeal exudate.   Eyes: EOM are normal. Pupils are equal, round, and reactive to light.   Neck: Neck supple. No JVD present. No thyromegaly present.   Cardiovascular: Normal rate, regular rhythm and normal heart sounds.    Pulmonary/Chest: Effort normal and breath sounds normal. No accessory muscle usage. No respiratory distress. She has no wheezes. She has no rales.   Abdominal: Soft. Bowel sounds are normal. She exhibits no distension. There is no tenderness.   Musculoskeletal:   Exam of the lumbar spine reveals prior laminectomy scar.  No vertebral tenderness noted.  Paraspinal muscle tension and some tenderness noted.   Lymphadenopathy:     She has no cervical adenopathy.   Neurological: She is alert and oriented to person, place, and time. No cranial nerve deficit.   Psychiatric: She has a normal mood and affect. Her speech is normal and behavior is normal.       PHQ-2/PHQ-9 Depression Screening 2/13/2018   Little interest or pleasure in doing things 0   Feeling down, depressed, or hopeless 0   Trouble falling or staying asleep, or sleeping too much -   Feeling tired or having little energy -   Poor appetite or overeating -   Feeling bad about yourself - or that you are a failure or have let yourself or your family down -   Trouble concentrating on things, such as reading the newspaper or watching television -   Moving or speaking so slowly that other people could " have noticed. Or the opposite - being so fidgety or restless that you have been moving around a lot more than usual -   Thoughts that you would be better off dead, or of hurting yourself in some way -   Total Score 0   If you checked off any problems, how difficult have these problems made it for you to do your work, take care of things at home, or get along with other people? -       Assessment/Plan     Resume Neurontin, gradually working up to prior dose, which was 800 mg one half tablet in a.m., one half tablet midday, and 1 tablet daily at bedtime.  Continue the Mobic 15 mg daily as needed.    Continue the Norvasc and lisinopril HCT.  Pursue sodium restriction and calorie reduction for weight loss.  Monitor blood pressure at home.    Continue the Aricept and Namenda.  She might be able to tolerate higher doses of Namenda in the future, but we would want to check over notes to make sure we have already tried this previously.  Continue the Effexor XR.    Lumbar x-rays do not reveal a loose or misplaced lumbar pedicle screw.  We will share this information with the patient and her .    Weight loss to hopefully help reduce the chronic low back pain.    Return to clinic in a couple months as previously scheduled.    Diagnoses and all orders for this visit:    Lumbar radiculopathy  -     XR Spine Lumbar 2 or 3 View    Disorder of lumbar spine  -     XR Spine Lumbar 2 or 3 View    Chronic bilateral low back pain with left-sided sciatica  -     XR Spine Lumbar 2 or 3 View    Essential hypertension    Class 2 obesity due to excess calories with serious comorbidity and body mass index (BMI) of 37.0 to 37.9 in adult    Early onset Alzheimer's dementia without behavioral disturbance    Recurrent major depressive disorder, in partial remission    Other orders  -     gabapentin (NEURONTIN) 800 MG tablet; TAKE 1/2 TABLET IN THE MORNING AND AT NOON AND 1 BY MOUTH EVERY NIGHT AT BEDTIME FOR CHRONIC BACK PAIN        No  visits with results within 3 Week(s) from this visit.   Latest known visit with results is:   Admission on 03/27/2018, Discharged on 03/27/2018   Component Date Value Ref Range Status   • Color 03/27/2018 Yellow  Yellow, Straw, Dark Yellow, Farideh Final   • Clarity, UA 03/27/2018 Clear  Clear Final   • Glucose, UA 03/27/2018 Negative  Negative, 1000 mg/dL (3+) mg/dL Final   • Bilirubin 03/27/2018 Negative  Negative Final   • Ketones, UA 03/27/2018 Negative  Negative Final   • Specific Gravity  03/27/2018 1.015  1.005 - 1.030 Final   • Blood, UA 03/27/2018 Negative  Negative Final   • pH, Urine 03/27/2018 7.0  5.0 - 8.0 Final   • Protein, POC 03/27/2018 Negative  Negative mg/dL Final   • Urobilinogen, UA 03/27/2018 Normal  Normal Final   • Leukocytes 03/27/2018 Small (1+)* Negative Final   • Nitrite, UA 03/27/2018 Negative  Negative Final   • Rapid Influenza A Ag 03/27/2018 NEG   Final   • Rapid Influenza B Ag 03/27/2018 NEG   Final   • Internal Control 03/27/2018 Passed  Passed Final   • Lot Number 03/27/2018 [id number]   Final   • Expiration Date 03/27/2018 3/20   Final   • Glucose 03/27/2018 105* 70 - 100 mg/dL Final   • BUN 03/27/2018 18  8 - 25 mg/dL Final   • Creatinine 03/27/2018 1.00  0.40 - 1.30 mg/dL Final   • Sodium 03/27/2018 139  134 - 146 mmol/L Final   • Potassium 03/27/2018 4.6  3.4 - 5.4 mmol/L Final   • Chloride 03/27/2018 100  100 - 112 mmol/L Final   • CO2 03/27/2018 30.0  20.0 - 32.0 mmol/L Final   • Calcium 03/27/2018 9.8  8.4 - 10.8 mg/dL Final   • Total Protein 03/27/2018 7.1  6.7 - 8.2 g/dL Final   • Albumin 03/27/2018 4.40  3.20 - 5.50 g/dL Final   • ALT (SGPT) 03/27/2018 16  10 - 60 U/L Final   • AST (SGOT) 03/27/2018 17  10 - 60 U/L Final   • Alkaline Phosphatase 03/27/2018 101  15 - 121 U/L Final   • Total Bilirubin 03/27/2018 0.9  0.2 - 1.0 mg/dL Final   • eGFR Non African Amer 03/27/2018 56  45 - 104 mL/min/1.73 Final   • Globulin 03/27/2018 2.7  2.5 - 4.6 gm/dL Final   • A/G Ratio  03/27/2018 1.6  1.0 - 3.0 g/dL Final   • BUN/Creatinine Ratio 03/27/2018 18.0  7.0 - 25.0 Final   • Anion Gap 03/27/2018 9.0  5.0 - 15.0 mmol/L Final   • WBC 03/27/2018 7.55  3.20 - 9.80 10*3/mm3 Final   • RBC 03/27/2018 4.87  3.77 - 5.16 10*6/mm3 Final   • Hemoglobin 03/27/2018 14.0  12.0 - 15.5 g/dL Final   • Hematocrit 03/27/2018 42.1  35.0 - 45.0 % Final   • MCV 03/27/2018 86.4  80.0 - 98.0 fL Final   • MCH 03/27/2018 28.7  26.5 - 34.0 pg Final   • MCHC 03/27/2018 33.3  31.4 - 36.0 g/dL Final   • RDW 03/27/2018 13.3  11.5 - 14.5 % Final   • RDW-SD 03/27/2018 40.8  36.4 - 46.3 fl Final   • MPV 03/27/2018 11.6  8.0 - 12.0 fL Final   • Platelets 03/27/2018 232  150 - 450 10*3/mm3 Final   • Neutrophil % 03/27/2018 68.9  37.0 - 80.0 % Final   • Lymphocyte % 03/27/2018 21.3  10.0 - 50.0 % Final   • Monocyte % 03/27/2018 7.3  0.0 - 12.0 % Final   • Eosinophil % 03/27/2018 2.4  0.0 - 7.0 % Final   • Basophil % 03/27/2018 0.1  0.0 - 2.0 % Final   • Neutrophils, Absolute 03/27/2018 5.20  2.00 - 8.60 10*3/mm3 Final   • Lymphocytes, Absolute 03/27/2018 1.61  0.60 - 4.20 10*3/mm3 Final   • Monocytes, Absolute 03/27/2018 0.55  0.00 - 0.90 10*3/mm3 Final   • Eosinophils, Absolute 03/27/2018 0.18  0.00 - 0.70 10*3/mm3 Final   • Basophils, Absolute 03/27/2018 0.01  0.00 - 0.20 10*3/mm3 Final   ]

## 2018-07-25 RX ORDER — ATORVASTATIN CALCIUM 10 MG/1
10 TABLET, FILM COATED ORAL DAILY
Qty: 30 TABLET | Refills: 0 | Status: SHIPPED | OUTPATIENT
Start: 2018-07-25 | End: 2018-11-28 | Stop reason: SDUPTHER

## 2018-08-02 RX ORDER — TIZANIDINE 4 MG/1
TABLET ORAL
Qty: 14 TABLET | Refills: 0 | Status: SHIPPED | OUTPATIENT
Start: 2018-08-02 | End: 2018-11-02 | Stop reason: SDUPTHER

## 2018-08-10 ENCOUNTER — LAB (OUTPATIENT)
Dept: LAB | Facility: OTHER | Age: 63
End: 2018-08-10

## 2018-08-10 DIAGNOSIS — R73.01 IMPAIRED FASTING GLUCOSE: Chronic | ICD-10-CM

## 2018-08-10 DIAGNOSIS — I10 ESSENTIAL HYPERTENSION: Chronic | ICD-10-CM

## 2018-08-10 DIAGNOSIS — E78.2 MIXED HYPERLIPIDEMIA: Chronic | ICD-10-CM

## 2018-08-10 DIAGNOSIS — D53.1 MEGALOBLASTIC ANEMIA DUE TO VITAMIN B12 DEFICIENCY: Chronic | ICD-10-CM

## 2018-08-10 LAB
ALBUMIN SERPL-MCNC: 4.2 G/DL (ref 3.5–5)
ALBUMIN/GLOB SERPL: 1.5 G/DL (ref 1.1–1.8)
ALP SERPL-CCNC: 92 U/L (ref 38–126)
ALT SERPL W P-5'-P-CCNC: 19 U/L
ANION GAP SERPL CALCULATED.3IONS-SCNC: 8 MMOL/L (ref 5–15)
AST SERPL-CCNC: 23 U/L (ref 14–36)
BASOPHILS # BLD AUTO: 0.02 10*3/MM3 (ref 0–0.2)
BASOPHILS NFR BLD AUTO: 0.3 % (ref 0–2)
BILIRUB SERPL-MCNC: 0.6 MG/DL (ref 0.2–1.3)
BUN BLD-MCNC: 15 MG/DL (ref 7–17)
BUN/CREAT SERPL: 16.7 (ref 7–25)
CALCIUM SPEC-SCNC: 9.8 MG/DL (ref 8.4–10.2)
CHLORIDE SERPL-SCNC: 106 MMOL/L (ref 98–107)
CHOLEST SERPL-MCNC: 177 MG/DL (ref 150–200)
CO2 SERPL-SCNC: 31 MMOL/L (ref 22–30)
CREAT BLD-MCNC: 0.9 MG/DL (ref 0.52–1.04)
DEPRECATED RDW RBC AUTO: 42.4 FL (ref 36.4–46.3)
EOSINOPHIL # BLD AUTO: 0.18 10*3/MM3 (ref 0–0.7)
EOSINOPHIL NFR BLD AUTO: 3.1 % (ref 0–7)
ERYTHROCYTE [DISTWIDTH] IN BLOOD BY AUTOMATED COUNT: 13.3 % (ref 11.5–14.5)
GFR SERPL CREATININE-BSD FRML MDRD: 63 ML/MIN/1.73 (ref 45–104)
GLOBULIN UR ELPH-MCNC: 2.8 GM/DL (ref 2.3–3.5)
GLUCOSE BLD-MCNC: 108 MG/DL (ref 74–99)
HBA1C MFR BLD: 5.7 % (ref 4–5.6)
HCT VFR BLD AUTO: 41.2 % (ref 35–45)
HDLC SERPL-MCNC: 61 MG/DL (ref 40–59)
HGB BLD-MCNC: 13.4 G/DL (ref 12–15.5)
LDLC SERPL CALC-MCNC: 88 MG/DL
LDLC/HDLC SERPL: 1.44 {RATIO} (ref 0–3.22)
LYMPHOCYTES # BLD AUTO: 1.33 10*3/MM3 (ref 0.6–4.2)
LYMPHOCYTES NFR BLD AUTO: 22.9 % (ref 10–50)
MCH RBC QN AUTO: 29.1 PG (ref 26.5–34)
MCHC RBC AUTO-ENTMCNC: 32.5 G/DL (ref 31.4–36)
MCV RBC AUTO: 89.6 FL (ref 80–98)
MONOCYTES # BLD AUTO: 0.47 10*3/MM3 (ref 0–0.9)
MONOCYTES NFR BLD AUTO: 8.1 % (ref 0–12)
NEUTROPHILS # BLD AUTO: 3.81 10*3/MM3 (ref 2–8.6)
NEUTROPHILS NFR BLD AUTO: 65.6 % (ref 37–80)
PLATELET # BLD AUTO: 175 10*3/MM3 (ref 150–450)
PMV BLD AUTO: 12.2 FL (ref 8–12)
POTASSIUM BLD-SCNC: 4.4 MMOL/L (ref 3.4–5)
PROT SERPL-MCNC: 7 G/DL (ref 6.3–8.2)
RBC # BLD AUTO: 4.6 10*6/MM3 (ref 3.77–5.16)
SODIUM BLD-SCNC: 145 MMOL/L (ref 137–145)
TRIGL SERPL-MCNC: 142 MG/DL
TSH SERPL DL<=0.05 MIU/L-ACNC: 0.95 MIU/ML (ref 0.46–4.68)
VIT B12 BLD-MCNC: 621 PG/ML (ref 239–931)
VLDLC SERPL-MCNC: 28.4 MG/DL
WBC NRBC COR # BLD: 5.81 10*3/MM3 (ref 3.2–9.8)

## 2018-08-10 PROCEDURE — 36415 COLL VENOUS BLD VENIPUNCTURE: CPT | Performed by: INTERNAL MEDICINE

## 2018-08-10 PROCEDURE — 84443 ASSAY THYROID STIM HORMONE: CPT | Performed by: INTERNAL MEDICINE

## 2018-08-10 PROCEDURE — 82607 VITAMIN B-12: CPT | Performed by: INTERNAL MEDICINE

## 2018-08-10 PROCEDURE — 83036 HEMOGLOBIN GLYCOSYLATED A1C: CPT | Performed by: INTERNAL MEDICINE

## 2018-08-10 PROCEDURE — 85025 COMPLETE CBC W/AUTO DIFF WBC: CPT | Performed by: INTERNAL MEDICINE

## 2018-08-10 PROCEDURE — 80053 COMPREHEN METABOLIC PANEL: CPT | Performed by: INTERNAL MEDICINE

## 2018-08-10 PROCEDURE — 80061 LIPID PANEL: CPT | Performed by: INTERNAL MEDICINE

## 2018-08-17 ENCOUNTER — OFFICE VISIT (OUTPATIENT)
Dept: FAMILY MEDICINE CLINIC | Facility: CLINIC | Age: 63
End: 2018-08-17

## 2018-08-17 VITALS
TEMPERATURE: 98 F | SYSTOLIC BLOOD PRESSURE: 120 MMHG | WEIGHT: 185.4 LBS | BODY MASS INDEX: 36.4 KG/M2 | HEART RATE: 76 BPM | HEIGHT: 60 IN | DIASTOLIC BLOOD PRESSURE: 80 MMHG

## 2018-08-17 DIAGNOSIS — F33.41 RECURRENT MAJOR DEPRESSIVE DISORDER, IN PARTIAL REMISSION (HCC): Chronic | ICD-10-CM

## 2018-08-17 DIAGNOSIS — K42.9 UMBILICAL HERNIA WITHOUT OBSTRUCTION OR GANGRENE: ICD-10-CM

## 2018-08-17 DIAGNOSIS — F02.80 EARLY ONSET ALZHEIMER'S DEMENTIA WITHOUT BEHAVIORAL DISTURBANCE (HCC): Chronic | ICD-10-CM

## 2018-08-17 DIAGNOSIS — E78.2 MIXED HYPERLIPIDEMIA: Primary | Chronic | ICD-10-CM

## 2018-08-17 DIAGNOSIS — D53.1 MEGALOBLASTIC ANEMIA DUE TO VITAMIN B12 DEFICIENCY: Chronic | ICD-10-CM

## 2018-08-17 DIAGNOSIS — K29.30 CHRONIC SUPERFICIAL GASTRITIS WITHOUT BLEEDING: Chronic | ICD-10-CM

## 2018-08-17 DIAGNOSIS — F41.1 GENERALIZED ANXIETY DISORDER: Chronic | ICD-10-CM

## 2018-08-17 DIAGNOSIS — I67.9 CEREBROVASCULAR DISEASE: Chronic | ICD-10-CM

## 2018-08-17 DIAGNOSIS — R73.01 IMPAIRED FASTING GLUCOSE: Chronic | ICD-10-CM

## 2018-08-17 DIAGNOSIS — I10 ESSENTIAL HYPERTENSION: Chronic | ICD-10-CM

## 2018-08-17 DIAGNOSIS — G30.0 EARLY ONSET ALZHEIMER'S DEMENTIA WITHOUT BEHAVIORAL DISTURBANCE (HCC): Chronic | ICD-10-CM

## 2018-08-17 DIAGNOSIS — R29.6 FALL IN ELDERLY PATIENT: ICD-10-CM

## 2018-08-17 DIAGNOSIS — K91.89 POSTCHOLECYSTECTOMY DIARRHEA: Chronic | ICD-10-CM

## 2018-08-17 DIAGNOSIS — K21.9 GASTROESOPHAGEAL REFLUX DISEASE WITHOUT ESOPHAGITIS: Chronic | ICD-10-CM

## 2018-08-17 DIAGNOSIS — R19.7 POSTCHOLECYSTECTOMY DIARRHEA: Chronic | ICD-10-CM

## 2018-08-17 DIAGNOSIS — R19.05 PERIUMBILICAL MASS: ICD-10-CM

## 2018-08-17 PROCEDURE — 99214 OFFICE O/P EST MOD 30 MIN: CPT | Performed by: INTERNAL MEDICINE

## 2018-08-17 NOTE — PROGRESS NOTES
Subjective        History of Present Illness     Andria Goldstein is a 63 y.o. female who presents for 6-month follow up on hyperlipidemia, hypertension, cerebrovascular disease, apparent Alzheimer's dementia, chronic diarrhea of undetermined etiology, impaired fasting glucose and vitamin B-12 deficiency among other issues.  She continues on Neurontin for chronic bilateral lower lumbar back pain with left-sided sciatica and lumbar radiculopathy.  She denies any stroke-like symptoms.    She has struggled with chronic diarrhea and continues to have episodic diarrhea with occasional bowel incontinence, especially if she is going out away from home.  I recommend premedicating with Imodium.      Her  reports they have noticed her having decreased energy as well as mild myalgias.  We discussed the importance of regular physical activity to help stay conditioning.  She may need to take short breaks from her Lipitor for any myalgias.         She reports a fullness in the abdomen.  Exam reveals 3 to 4 cm smooth round nontender mass just about 4 cm to the left and below umbilicus.   She is up to date on colonoscopy.  Her last colonoscopy was 06/2016.  We will get a CT of the abdomen to evaluate.  She has an allergy to IV contrast dye.     She continues on Aricept and Namenda for dementia and Effexor XR, which is adequately controlling anxiety and depression.      DEXA 09/2016 revealed normal bone density.  She was seen at [facility] after a fall on 08/07/18 sustaining minor injuries to right wrist, knee, and shoulder.  X-rays were negative for fracture.  She has had no additional falls.      Weight is down 5 pounds in the past six months.  Blood pressure at goal.     B-12 has drifted down with daily oral B-12, but remains at goal at 621.      The patient's relevant past medical, surgical, and social history was reviewed in Epic.   Lab results are reviewed with the patient today. CBC unremarkable.  A1c is 5.7.  Fasting  "glucose 108.  Total cholesterol 177. HDL 61. LDL 88.  Triglycerides 144.      Review of Systems   Constitutional: Negative for chills, fatigue and fever.   HENT: Negative for congestion, ear pain, postnasal drip, sinus pressure and sore throat.    Respiratory: Negative for cough, shortness of breath and wheezing.    Cardiovascular: Negative for chest pain, palpitations and leg swelling.   Gastrointestinal: Negative for abdominal pain, blood in stool, constipation, diarrhea, nausea and vomiting.   Endocrine: Negative for cold intolerance, heat intolerance, polydipsia and polyuria.   Genitourinary: Negative for dysuria, frequency, hematuria and urgency.   Skin: Negative for rash.   Neurological: Negative for syncope and weakness.        Objective     Vitals:    08/17/18 1012   BP: 120/80   Pulse: 76   Temp: 98 °F (36.7 °C)   TempSrc: Oral   Weight: 84.1 kg (185 lb 6.4 oz)   Height: 152.4 cm (60\")     Physical Exam   Constitutional: She is oriented to person, place, and time. She appears well-developed and well-nourished. No distress.   Obese female accompanied by her .    HENT:   Head: Normocephalic and atraumatic.   Nose: Right sinus exhibits no maxillary sinus tenderness and no frontal sinus tenderness. Left sinus exhibits no maxillary sinus tenderness and no frontal sinus tenderness.   Mouth/Throat: Uvula is midline, oropharynx is clear and moist and mucous membranes are normal. No oral lesions. No tonsillar exudate.   Eyes: Pupils are equal, round, and reactive to light. Conjunctivae and EOM are normal.   Neck: Trachea normal. Neck supple. No JVD present. Carotid bruit is not present. No tracheal deviation present. No thyroid mass and no thyromegaly present.   Cardiovascular: Normal rate, regular rhythm, normal heart sounds and intact distal pulses.   No extrasystoles are present. PMI is not displaced.    No murmur heard.  Pulmonary/Chest: Effort normal and breath sounds normal. No accessory muscle usage. " No respiratory distress. She has no decreased breath sounds. She has no wheezes. She has no rhonchi. She has no rales.   Abdominal: Soft. Bowel sounds are normal. She exhibits no distension. There is no hepatosplenomegaly. There is no tenderness.   Exam reveals 3 to 4 cm smooth round nontender mass just about 4 cm to the left and below umbilicus.     Vascular Status -  Her right foot exhibits normal foot vasculature  and no edema. Her left foot exhibits normal foot vasculature  and no edema.  Lymphadenopathy:     She has no cervical adenopathy.   Neurological: She is alert and oriented to person, place, and time. No cranial nerve deficit. Coordination normal.   Skin: Skin is warm, dry and intact. No rash noted. No cyanosis. Nails show no clubbing.   Psychiatric: She has a normal mood and affect. Her speech is normal and behavior is normal. Judgment and thought content normal.   Vitals reviewed.    Assessment/Plan      An order is sent for her to schedule CT of the abdomen to evaluate nontender abdominal mass.     Continue the Norvasc, lisinopril HCT, and Coreg.  Pursue sodium restriction and calorie reduction for weight loss.  Monitor blood pressure at home.     Continue the Aricept and Namenda.  Continue the Effexor XR.     I recommended she try to increase physical activity to help with conditioning.  She may also need to take short break from her statin medication, Lipitor, for myalgias.  Otherwise, continue the current vascular risk factor modifications and daily aspirin.    Intensify diet, exercise, and weight loss efforts to delay progression of impaired fasting glucose.  Written literature regarding diet and exercise included in patient's AVS.      Recommended she premedicate with Imodium, especially when going out away from home.   Continue the omeprazole to manage GERD.      Continue oral B-12.      Continue other medications and vitamin and mineral supplements to treat additional medical problems which we  addressed today.  Return in six months for follow up with fasting labs one week prior.     Scribed for Dr. Mckeon by Cesilia Gutierrez [facility].      Diagnoses and all orders for this visit:    Mixed hyperlipidemia  -     Lipid Panel; Future    Essential hypertension  -     CBC Auto Differential; Future  -     Comprehensive Metabolic Panel; Future    Impaired fasting glucose    Cerebrovascular disease    Gastroesophageal reflux disease without esophagitis    Chronic superficial gastritis without bleeding    Postcholecystectomy diarrhea    Early onset Alzheimer's dementia without behavioral disturbance    Umbilical hernia without obstruction or gangrene  -     CT Abdomen Pelvis Without Contrast    Recurrent major depressive disorder, in partial remission (CMS/HCC)    Generalized anxiety disorder    Periumbilical mass  -     CT Abdomen Pelvis Without Contrast    Fall in elderly patient    Megaloblastic anemia due to vitamin B12 deficiency  -     Vitamin B12; Future        Lab on 08/10/2018   Component Date Value Ref Range Status   • Glucose 08/10/2018 108* 74 - 99 mg/dL Final   • BUN 08/10/2018 15  7 - 17 mg/dL Final   • Creatinine 08/10/2018 0.90  0.52 - 1.04 mg/dL Final   • Sodium 08/10/2018 145  137 - 145 mmol/L Final   • Potassium 08/10/2018 4.4  3.4 - 5.0 mmol/L Final   • Chloride 08/10/2018 106  98 - 107 mmol/L Final   • CO2 08/10/2018 31.0* 22.0 - 30.0 mmol/L Final   • Calcium 08/10/2018 9.8  8.4 - 10.2 mg/dL Final   • Total Protein 08/10/2018 7.0  6.3 - 8.2 g/dL Final   • Albumin 08/10/2018 4.20  3.50 - 5.00 g/dL Final   • ALT (SGPT) 08/10/2018 19  <=35 U/L Final   • AST (SGOT) 08/10/2018 23  14 - 36 U/L Final   • Alkaline Phosphatase 08/10/2018 92  38 - 126 U/L Final   • Total Bilirubin 08/10/2018 0.6  0.2 - 1.3 mg/dL Final   • eGFR Non African Amer 08/10/2018 63  45 - 104 mL/min/1.73 Final   • Globulin 08/10/2018 2.8  2.3 - 3.5 gm/dL Final   • A/G Ratio 08/10/2018 1.5  1.1 - 1.8 g/dL Final   • BUN/Creatinine  Ratio 08/10/2018 16.7  7.0 - 25.0 Final   • Anion Gap 08/10/2018 8.0  5.0 - 15.0 mmol/L Final   • WBC 08/10/2018 5.81  3.20 - 9.80 10*3/mm3 Final   • RBC 08/10/2018 4.60  3.77 - 5.16 10*6/mm3 Final   • Hemoglobin 08/10/2018 13.4  12.0 - 15.5 g/dL Final   • Hematocrit 08/10/2018 41.2  35.0 - 45.0 % Final   • MCV 08/10/2018 89.6  80.0 - 98.0 fL Final   • MCH 08/10/2018 29.1  26.5 - 34.0 pg Final   • MCHC 08/10/2018 32.5  31.4 - 36.0 g/dL Final   • RDW 08/10/2018 13.3  11.5 - 14.5 % Final   • RDW-SD 08/10/2018 42.4  36.4 - 46.3 fl Final   • MPV 08/10/2018 12.2* 8.0 - 12.0 fL Final   • Platelets 08/10/2018 175  150 - 450 10*3/mm3 Final   • Neutrophil % 08/10/2018 65.6  37.0 - 80.0 % Final   • Lymphocyte % 08/10/2018 22.9  10.0 - 50.0 % Final   • Monocyte % 08/10/2018 8.1  0.0 - 12.0 % Final   • Eosinophil % 08/10/2018 3.1  0.0 - 7.0 % Final   • Basophil % 08/10/2018 0.3  0.0 - 2.0 % Final   • Neutrophils, Absolute 08/10/2018 3.81  2.00 - 8.60 10*3/mm3 Final   • Lymphocytes, Absolute 08/10/2018 1.33  0.60 - 4.20 10*3/mm3 Final   • Monocytes, Absolute 08/10/2018 0.47  0.00 - 0.90 10*3/mm3 Final   • Eosinophils, Absolute 08/10/2018 0.18  0.00 - 0.70 10*3/mm3 Final   • Basophils, Absolute 08/10/2018 0.02  0.00 - 0.20 10*3/mm3 Final   • Hemoglobin A1C 08/10/2018 5.7* 4 - 5.6 % Final   • Total Cholesterol 08/10/2018 177  150 - 200 mg/dL Final   • Triglycerides 08/10/2018 142  <=150 mg/dL Final   • HDL Cholesterol 08/10/2018 61* 40 - 59 mg/dL Final   • LDL Cholesterol  08/10/2018 88  <=100 mg/dL Final   • VLDL Cholesterol 08/10/2018 28.4  mg/dL Final   • LDL/HDL Ratio 08/10/2018 1.44  0.00 - 3.22 Final   • Vitamin B-12 08/10/2018 621  239 - 931 pg/mL Final   • TSH 08/10/2018 0.950  0.460 - 4.680 mIU/mL Final   ]

## 2018-08-17 NOTE — PATIENT INSTRUCTIONS
Exercising to Lose Weight  Exercising can help you to lose weight. In order to lose weight through exercise, you need to do vigorous-intensity exercise. You can tell that you are exercising with vigorous intensity if you are breathing very hard and fast and cannot hold a conversation while exercising.  Moderate-intensity exercise helps to maintain your current weight. You can tell that you are exercising at a moderate level if you have a higher heart rate and faster breathing, but you are still able to hold a conversation.  How often should I exercise?  Choose an activity that you enjoy and set realistic goals. Your health care provider can help you to make an activity plan that works for you. Exercise regularly as directed by your health care provider. This may include:  · Doing resistance training twice each week, such as:  ? Push-ups.  ? Sit-ups.  ? Lifting weights.  ? Using resistance bands.  · Doing a given intensity of exercise for a given amount of time. Choose from these options:  ? 150 minutes of moderate-intensity exercise every week.  ? 75 minutes of vigorous-intensity exercise every week.  ? A mix of moderate-intensity and vigorous-intensity exercise every week.    Children, pregnant women, people who are out of shape, people who are overweight, and older adults may need to consult a health care provider for individual recommendations. If you have any sort of medical condition, be sure to consult your health care provider before starting a new exercise program.  What are some activities that can help me to lose weight?  · Walking at a rate of at least 4.5 miles an hour.  · Jogging or running at a rate of 5 miles per hour.  · Biking at a rate of at least 10 miles per hour.  · Lap swimming.  · Roller-skating or in-line skating.  · Cross-country skiing.  · Vigorous competitive sports, such as football, basketball, and soccer.  · Jumping rope.  · Aerobic dancing.  How can I be more active in my day-to-day  activities?  · Use the stairs instead of the elevator.  · Take a walk during your lunch break.  · If you drive, park your car farther away from work or school.  · If you take public transportation, get off one stop early and walk the rest of the way.  · Make all of your phone calls while standing up and walking around.  · Get up, stretch, and walk around every 30 minutes throughout the day.  What guidelines should I follow while exercising?  · Do not exercise so much that you hurt yourself, feel dizzy, or get very short of breath.  · Consult your health care provider prior to starting a new exercise program.  · Wear comfortable clothes and shoes with good support.  · Drink plenty of water while you exercise to prevent dehydration or heat stroke. Body water is lost during exercise and must be replaced.  · Work out until you breathe faster and your heart beats faster.  This information is not intended to replace advice given to you by your health care provider. Make sure you discuss any questions you have with your health care provider.  Document Released: 01/20/2012 Document Revised: 05/25/2017 Document Reviewed: 05/21/2015  [employer] Patient Education © 2018 [employer]      Calorie Counting for Weight Loss  Calories are units of energy. Your body needs a certain amount of calories from food to keep you going throughout the day. When you eat more calories than your body needs, your body stores the extra calories as fat. When you eat fewer calories than your body needs, your body burns fat to get the energy it needs.  Calorie counting means keeping track of how many calories you eat and drink each day. Calorie counting can be helpful if you need to lose weight. If you make sure to eat fewer calories than your body needs, you should lose weight. Ask your health care provider what a healthy weight is for you.  For calorie counting to work, you will need to eat the right number of calories in a day in order  to lose a healthy amount of weight per week. A dietitian can help you determine how many calories you need in a day and will give you suggestions on how to reach your calorie goal.  · A healthy amount of weight to lose per week is usually 1-2 lb (0.5-0.9 kg). This usually means that your daily calorie intake should be reduced by 500-750 calories.  · Eating 1,200 - 1,500 calories per day can help most women lose weight.  · Eating 1,500 - 1,800 calories per day can help most men lose weight.    What do I need to know about calorie counting?  In order to meet your daily calorie goal, you will need to:  · Find out how many calories are in each food you would like to eat. Try to do this before you eat.  · Decide how much of the food you plan to eat.  · Write down what you ate and how many calories it had. Doing this is called keeping a food log.    To successfully lose weight, it is important to balance calorie counting with a healthy lifestyle that includes regular activity. Aim for 150 minutes of moderate exercise (such as walking) or 75 minutes of vigorous exercise (such as running) each week.  Where do I find calorie information?    The number of calories in a food can be found on a Nutrition Facts label. If a food does not have a Nutrition Facts label, try to look up the calories online or ask your dietitian for help.  Remember that calories are listed per serving. If you choose to have more than one serving of a food, you will have to multiply the calories per serving by the amount of servings you plan to eat. For example, the label on a package of bread might say that a serving size is 1 slice and that there are 90 calories in a serving. If you eat 1 slice, you will have eaten 90 calories. If you eat 2 slices, you will have eaten 180 calories.  How do I keep a food log?  Immediately after each meal, record the following information in your food log:  · What you ate. Don't forget to include toppings, sauces, and  "other extras on the food.  · How much you ate. This can be measured in cups, ounces, or number of items.  · How many calories each food and drink had.  · The total number of calories in the meal.    Keep your food log near you, such as in a small notebook in your pocket, or use a mobile allegra or website. Some programs will calculate calories for you and show you how many calories you have left for the day to meet your goal.  What are some calorie counting tips?  · Use your calories on foods and drinks that will fill you up and not leave you hungry:  ? Some examples of foods that fill you up are nuts and nut butters, vegetables, lean proteins, and high-fiber foods like whole grains. High-fiber foods are foods with more than 5 g fiber per serving.  ? Drinks such as sodas, specialty coffee drinks, alcohol, and juices have a lot of calories, yet do not fill you up.  · Eat nutritious foods and avoid empty calories. Empty calories are calories you get from foods or beverages that do not have many vitamins or protein, such as candy, sweets, and soda. It is better to have a nutritious high-calorie food (such as an avocado) than a food with few nutrients (such as a bag of chips).  · Know how many calories are in the foods you eat most often. This will help you calculate calorie counts faster.  · Pay attention to calories in drinks. Low-calorie drinks include water and unsweetened drinks.  · Pay attention to nutrition labels for \"low fat\" or \"fat free\" foods. These foods sometimes have the same amount of calories or more calories than the full fat versions. They also often have added sugar, starch, or salt, to make up for flavor that was removed with the fat.  · Find a way of tracking calories that works for you. Get creative. Try different apps or programs if writing down calories does not work for you.  What are some portion control tips?  · Know how many calories are in a serving. This will help you know how many servings of " a certain food you can have.  · Use a measuring cup to measure serving sizes. You could also try weighing out portions on a kitchen scale. With time, you will be able to estimate serving sizes for some foods.  · Take some time to put servings of different foods on your favorite plates, bowls, and cups so you know what a serving looks like.  · Try not to eat straight from a bag or box. Doing this can lead to overeating. Put the amount you would like to eat in a cup or on a plate to make sure you are eating the right portion.  · Use smaller plates, glasses, and bowls to prevent overeating.  · Try not to multitask (for example, watch TV or use your computer) while eating. If it is time to eat, sit down at a table and enjoy your food. This will help you to know when you are full. It will also help you to be aware of what you are eating and how much you are eating.  What are tips for following this plan?  Reading food labels  · Check the calorie count compared to the serving size. The serving size may be smaller than what you are used to eating.  · Check the source of the calories. Make sure the food you are eating is high in vitamins and protein and low in saturated and trans fats.  Shopping  · Read nutrition labels while you shop. This will help you make healthy decisions before you decide to purchase your food.  · Make a grocery list and stick to it.  Cooking  · Try to cook your favorite foods in a healthier way. For example, try baking instead of frying.  · Use low-fat dairy products.  Meal planning  · Use more fruits and vegetables. Half of your plate should be fruits and vegetables.  · Include lean proteins like poultry and fish.  How do I count calories when eating out?  · Ask for smaller portion sizes.  · Consider sharing an entree and sides instead of getting your own entree.  · If you get your own entree, eat only half. Ask for a box at the beginning of your meal and put the rest of your entree in it so you are  not tempted to eat it.  · If calories are listed on the menu, choose the lower calorie options.  · Choose dishes that include vegetables, fruits, whole grains, low-fat dairy products, and lean protein.  · Choose items that are boiled, broiled, grilled, or steamed. Stay away from items that are buttered, battered, fried, or served with cream sauce. Items labeled “crispy” are usually fried, unless stated otherwise.  · Choose water, low-fat milk, unsweetened iced tea, or other drinks without added sugar. If you want an alcoholic beverage, choose a lower calorie option such as a glass of wine or light beer.  · Ask for dressings, sauces, and syrups on the side. These are usually high in calories, so you should limit the amount you eat.  · If you want a salad, choose a garden salad and ask for grilled meats. Avoid extra toppings like saleem, cheese, or fried items. Ask for the dressing on the side, or ask for olive oil and vinegar or lemon to use as dressing.  · Estimate how many servings of a food you are given. For example, a serving of cooked rice is ½ cup or about the size of half a baseball. Knowing serving sizes will help you be aware of how much food you are eating at restaurants. The list below tells you how big or small some common portion sizes are based on everyday objects:  ? 1 oz--4 stacked dice.  ? 3 oz--1 deck of cards.  ? 1 tsp--1 die.  ? 1 Tbsp--½ a ping-pong ball.  ? 2 Tbsp--1 ping-pong ball.  ? ½ cup--½ baseball.  ? 1 cup--1 baseball.  Summary  · Calorie counting means keeping track of how many calories you eat and drink each day. If you eat fewer calories than your body needs, you should lose weight.  · A healthy amount of weight to lose per week is usually 1-2 lb (0.5-0.9 kg). This usually means reducing your daily calorie intake by 500-750 calories.  · The number of calories in a food can be found on a Nutrition Facts label. If a food does not have a Nutrition Facts label, try to look up the calories  online or ask your dietitian for help.  · Use your calories on foods and drinks that will fill you up, and not on foods and drinks that will leave you hungry.  · Use smaller plates, glasses, and bowls to prevent overeating.  This information is not intended to replace advice given to you by your health care provider. Make sure you discuss any questions you have with your health care provider.  Document Released: 12/18/2006 Document Revised: 11/17/2017 Document Reviewed: 11/17/2017  [employer] Interactive Patient Education © 2018 [employer]

## 2018-08-24 ENCOUNTER — TELEPHONE (OUTPATIENT)
Dept: FAMILY MEDICINE CLINIC | Facility: CLINIC | Age: 63
End: 2018-08-24

## 2018-08-24 DIAGNOSIS — R93.89 ABNORMAL CHEST X-RAY: Primary | ICD-10-CM

## 2018-08-24 NOTE — TELEPHONE ENCOUNTER
08/24/2018 relayed results and instructions      ----- Message from Christofer Mckeon MD sent at 8/23/2018  4:52 PM CDT -----  Notify the patient that she was right regarding what I felt in her abdomen, near her umbilicus.  It was her umbilical hernia.  However, the CAT scan revealed some hazy abnormalities of the lower portions of both of her lungs.  The upper portions were not imaged in this CT scan of the abdomen.  The radiologist recommends a noncontrast CT scan of the chest.  Schedule that.  We will call her with the results.  EB

## 2018-08-29 ENCOUNTER — TELEPHONE (OUTPATIENT)
Dept: FAMILY MEDICINE CLINIC | Facility: CLINIC | Age: 63
End: 2018-08-29

## 2018-08-29 DIAGNOSIS — R93.89 ABNORMAL CHEST CT: Primary | ICD-10-CM

## 2018-08-29 NOTE — TELEPHONE ENCOUNTER
08/29/2018 relayed results and instructions for repeat cat scan in 6 months. TP      ----- Message from Christofer Mckeon MD sent at 8/28/2018  5:56 PM CDT -----  Notify the patient/ that the CT scan of the chest reveals no evidence of cancer or other serious problem.  It does reveal some haziness in portions of the lung.  The radiologist recommends a repeat CT scan of the chest without contrast in 6 months.  Schedule that.  No IV contrast in this patient due to allergy.  EB

## 2018-09-25 ENCOUNTER — OFFICE VISIT (OUTPATIENT)
Dept: FAMILY MEDICINE CLINIC | Facility: CLINIC | Age: 63
End: 2018-09-25

## 2018-09-25 VITALS
SYSTOLIC BLOOD PRESSURE: 124 MMHG | HEIGHT: 60 IN | BODY MASS INDEX: 36.44 KG/M2 | WEIGHT: 185.6 LBS | DIASTOLIC BLOOD PRESSURE: 80 MMHG | TEMPERATURE: 98.3 F | HEART RATE: 80 BPM

## 2018-09-25 DIAGNOSIS — F02.80 EARLY ONSET ALZHEIMER'S DEMENTIA WITHOUT BEHAVIORAL DISTURBANCE (HCC): Primary | Chronic | ICD-10-CM

## 2018-09-25 DIAGNOSIS — G30.0 EARLY ONSET ALZHEIMER'S DEMENTIA WITHOUT BEHAVIORAL DISTURBANCE (HCC): Primary | Chronic | ICD-10-CM

## 2018-09-25 DIAGNOSIS — F33.41 RECURRENT MAJOR DEPRESSIVE DISORDER, IN PARTIAL REMISSION (HCC): Chronic | ICD-10-CM

## 2018-09-25 DIAGNOSIS — F41.1 GENERALIZED ANXIETY DISORDER: Chronic | ICD-10-CM

## 2018-09-25 DIAGNOSIS — IMO0001 CLASS 2 OBESITY DUE TO EXCESS CALORIES WITH SERIOUS COMORBIDITY AND BODY MASS INDEX (BMI) OF 36.0 TO 36.9 IN ADULT: Chronic | ICD-10-CM

## 2018-09-25 PROCEDURE — 99214 OFFICE O/P EST MOD 30 MIN: CPT | Performed by: INTERNAL MEDICINE

## 2018-09-25 RX ORDER — DONEPEZIL HYDROCHLORIDE 10 MG/1
10 TABLET, FILM COATED ORAL NIGHTLY
Qty: 30 TABLET | Refills: 11 | Status: SHIPPED | OUTPATIENT
Start: 2018-09-25 | End: 2018-09-25 | Stop reason: SDUPTHER

## 2018-09-25 RX ORDER — AMLODIPINE BESYLATE 5 MG/1
5 TABLET ORAL DAILY
Qty: 30 TABLET | Refills: 11 | Status: SHIPPED | OUTPATIENT
Start: 2018-09-25 | End: 2018-10-05 | Stop reason: SDUPTHER

## 2018-09-25 RX ORDER — DONEPEZIL HYDROCHLORIDE 10 MG/1
10 TABLET, FILM COATED ORAL NIGHTLY
Qty: 30 TABLET | Refills: 11 | Status: SHIPPED | OUTPATIENT
Start: 2018-09-25 | End: 2019-09-30 | Stop reason: SDUPTHER

## 2018-09-25 NOTE — PROGRESS NOTES
Subjective     Andria Goldstein is a 63 y.o. female.     History of Present Illness      Andria has come to the office today with her  and daughter.  They are reporting a progression of her dementia symptoms and memory loss.  She is occasionally getting agitated, but that is not at a significant level of frequency yet.  They have gradually been noticing cognitive decline throughout the summer.  They deny any sudden/acute mental status changes.  The patient is reporting no signs or symptoms to suggest UTI or other acute infection.  She is starting to stare blankly more frequently and she is interacting less with family members.  She is developing some hoarding tendencies, stacking things around the home and not wanting to get rid of anything.  No recent symptoms of TIA or stroke.    She continues on Namenda twice daily and Aricept 5 mg daily at bedtime.  We discussed trying a higher dose Aricept.  When I mentioned the fact that higher dose Aricept sometimes produces GI symptoms including appetite suppression, they voiced some concern, reporting that she is eating very little.  Her weight 7 months ago was 190 pounds, but her weight about the past 4 months has been stable at 185 pounds.  She is more than 50 pounds above her ideal body weight.  I encouraged them to make sure that she is hydrated well, but discouraged them from trying to get her to eat enough to maintain her current level of obesity.  Gradual weight loss would be beneficial in many ways for this patient with hypertension, hyperlipidemia, impaired fasting glucose, and obesity.  They voice understanding.    Review of Systems   Constitutional: Positive for activity change and appetite change. Negative for chills, fatigue, fever and unexpected weight change.   HENT: Negative for congestion, ear pain, postnasal drip, sinus pressure and sore throat.    Respiratory: Negative for cough, shortness of breath and wheezing.    Cardiovascular: Negative  "for chest pain, palpitations and leg swelling.   Gastrointestinal: Negative for abdominal pain, blood in stool, constipation, diarrhea, nausea and vomiting.   Endocrine: Negative for cold intolerance, heat intolerance, polydipsia and polyuria.   Genitourinary: Negative for dysuria, frequency, hematuria and urgency.   Skin: Negative for rash.   Neurological: Negative for syncope and weakness.   Psychiatric/Behavioral: Positive for decreased concentration and dysphoric mood. Negative for behavioral problems.       Objective     /80   Pulse 80   Temp 98.3 °F (36.8 °C) (Oral)   Ht 152.4 cm (60\")   Wt 84.2 kg (185 lb 9.6 oz)   BMI 36.25 kg/m²     Physical Exam   Constitutional: She is oriented to person, place, and time. She appears well-developed and well-nourished. No distress.   Pleasant, obese female accompanied by her  and daughter.  Obvious dementia, but not agitated.   HENT:   Head: Normocephalic and atraumatic.   Nose: Right sinus exhibits no maxillary sinus tenderness and no frontal sinus tenderness. Left sinus exhibits no maxillary sinus tenderness and no frontal sinus tenderness.   Mouth/Throat: Uvula is midline, oropharynx is clear and moist and mucous membranes are normal. No oral lesions. No tonsillar exudate.   Eyes: Pupils are equal, round, and reactive to light. Conjunctivae and EOM are normal.   Neck: Trachea normal. Neck supple. No JVD present. Carotid bruit is not present. No tracheal deviation present. No thyroid mass and no thyromegaly present.   Cardiovascular: Normal rate, regular rhythm, normal heart sounds and intact distal pulses.   No extrasystoles are present. PMI is not displaced.    No murmur heard.  Pulmonary/Chest: Effort normal and breath sounds normal. No accessory muscle usage. No respiratory distress. She has no decreased breath sounds. She has no wheezes. She has no rhonchi. She has no rales.   Abdominal: Soft. Bowel sounds are normal. She exhibits no distension. " There is no hepatosplenomegaly. There is no tenderness.   Exam reveals 3 to 4 cm smooth round nontender mass just about 4 cm to the left and below umbilicus.     Vascular Status -  Her right foot exhibits normal foot vasculature  and no edema. Her left foot exhibits normal foot vasculature  and no edema.  Lymphadenopathy:     She has no cervical adenopathy.   Neurological: She is alert and oriented to person, place, and time. No cranial nerve deficit. Coordination normal.   Skin: Skin is warm, dry and intact. No rash noted. No cyanosis. Nails show no clubbing.   Psychiatric: Her speech is normal.   Obvious dementia.  She does not spontaneously interact with us, but tends to be smiling at me and staring a little likely.  She does answer me when I ask questions, but not much substance to her responses.  No evidence of auditory or visual hallucinations.   Vitals reviewed.      PHQ-2/PHQ-9 Depression Screening 8/17/2018   Little interest or pleasure in doing things 0   Feeling down, depressed, or hopeless 0   Trouble falling or staying asleep, or sleeping too much -   Feeling tired or having little energy -   Poor appetite or overeating -   Feeling bad about yourself - or that you are a failure or have let yourself or your family down -   Trouble concentrating on things, such as reading the newspaper or watching television -   Moving or speaking so slowly that other people could have noticed. Or the opposite - being so fidgety or restless that you have been moving around a lot more than usual -   Thoughts that you would be better off dead, or of hurting yourself in some way -   Total Score 0   If you checked off any problems, how difficult have these problems made it for you to do your work, take care of things at home, or get along with other people? -       Assessment/Plan      Increase Aricept to 10 mg daily at bedtime, watching for an undesirable amount of GI symptoms. Continued Namenda 10 mg twice a day. We  discussed numerous measures to try to improve her quality of life and increase interaction with family members and friends, without causing her to feel overwhelmed.      Continue the Effexor  mg every morning.  She might benefit from the addition of some Wellbutrin to help with some of the vegetative issues.  We might try that in the future.  If her appetite does significantly decrease and she starts losing weight at an undesirable rate, we could try adding some Remeron.  For now, gradual weight loss would be quite beneficial for the patient.    We educated the family about the need to watch for acute mental status changes, and the relationship with acute infections, such as UTI.  There is no evidence of infection at this time. Today, we are dealing with gradual, chronic changes over the past few months.    Diagnoses and all orders for this visit:    Early onset Alzheimer's dementia without behavioral disturbance    Recurrent major depressive disorder, in partial remission (CMS/HCC)    Generalized anxiety disorder    Class 2 obesity due to excess calories with serious comorbidity and body mass index (BMI) of 36.0 to 36.9 in adult    Other orders  -     amLODIPine (NORVASC) 5 MG tablet; Take 1 tablet by mouth Daily.  -     Multiple Vitamins-Minerals (CENTRUM SILVER PO); Take 1 tablet by mouth Daily.  -     Discontinue: donepezil (ARICEPT) 10 MG tablet; Take 1 tablet by mouth Every Night.  -     donepezil (ARICEPT) 10 MG tablet; Take 1 tablet by mouth Every Night.        No visits with results within 3 Week(s) from this visit.   Latest known visit with results is:   Lab on 08/10/2018   Component Date Value Ref Range Status   • Glucose 08/10/2018 108* 74 - 99 mg/dL Final   • BUN 08/10/2018 15  7 - 17 mg/dL Final   • Creatinine 08/10/2018 0.90  0.52 - 1.04 mg/dL Final   • Sodium 08/10/2018 145  137 - 145 mmol/L Final   • Potassium 08/10/2018 4.4  3.4 - 5.0 mmol/L Final   • Chloride 08/10/2018 106  98 - 107 mmol/L  Final   • CO2 08/10/2018 31.0* 22.0 - 30.0 mmol/L Final   • Calcium 08/10/2018 9.8  8.4 - 10.2 mg/dL Final   • Total Protein 08/10/2018 7.0  6.3 - 8.2 g/dL Final   • Albumin 08/10/2018 4.20  3.50 - 5.00 g/dL Final   • ALT (SGPT) 08/10/2018 19  <=35 U/L Final   • AST (SGOT) 08/10/2018 23  14 - 36 U/L Final   • Alkaline Phosphatase 08/10/2018 92  38 - 126 U/L Final   • Total Bilirubin 08/10/2018 0.6  0.2 - 1.3 mg/dL Final   • eGFR Non African Amer 08/10/2018 63  45 - 104 mL/min/1.73 Final   • Globulin 08/10/2018 2.8  2.3 - 3.5 gm/dL Final   • A/G Ratio 08/10/2018 1.5  1.1 - 1.8 g/dL Final   • BUN/Creatinine Ratio 08/10/2018 16.7  7.0 - 25.0 Final   • Anion Gap 08/10/2018 8.0  5.0 - 15.0 mmol/L Final   • WBC 08/10/2018 5.81  3.20 - 9.80 10*3/mm3 Final   • RBC 08/10/2018 4.60  3.77 - 5.16 10*6/mm3 Final   • Hemoglobin 08/10/2018 13.4  12.0 - 15.5 g/dL Final   • Hematocrit 08/10/2018 41.2  35.0 - 45.0 % Final   • MCV 08/10/2018 89.6  80.0 - 98.0 fL Final   • MCH 08/10/2018 29.1  26.5 - 34.0 pg Final   • MCHC 08/10/2018 32.5  31.4 - 36.0 g/dL Final   • RDW 08/10/2018 13.3  11.5 - 14.5 % Final   • RDW-SD 08/10/2018 42.4  36.4 - 46.3 fl Final   • MPV 08/10/2018 12.2* 8.0 - 12.0 fL Final   • Platelets 08/10/2018 175  150 - 450 10*3/mm3 Final   • Neutrophil % 08/10/2018 65.6  37.0 - 80.0 % Final   • Lymphocyte % 08/10/2018 22.9  10.0 - 50.0 % Final   • Monocyte % 08/10/2018 8.1  0.0 - 12.0 % Final   • Eosinophil % 08/10/2018 3.1  0.0 - 7.0 % Final   • Basophil % 08/10/2018 0.3  0.0 - 2.0 % Final   • Neutrophils, Absolute 08/10/2018 3.81  2.00 - 8.60 10*3/mm3 Final   • Lymphocytes, Absolute 08/10/2018 1.33  0.60 - 4.20 10*3/mm3 Final   • Monocytes, Absolute 08/10/2018 0.47  0.00 - 0.90 10*3/mm3 Final   • Eosinophils, Absolute 08/10/2018 0.18  0.00 - 0.70 10*3/mm3 Final   • Basophils, Absolute 08/10/2018 0.02  0.00 - 0.20 10*3/mm3 Final   • Hemoglobin A1C 08/10/2018 5.7* 4 - 5.6 % Final   • Total Cholesterol 08/10/2018 177  150  - 200 mg/dL Final   • Triglycerides 08/10/2018 142  <=150 mg/dL Final   • HDL Cholesterol 08/10/2018 61* 40 - 59 mg/dL Final   • LDL Cholesterol  08/10/2018 88  <=100 mg/dL Final   • VLDL Cholesterol 08/10/2018 28.4  mg/dL Final   • LDL/HDL Ratio 08/10/2018 1.44  0.00 - 3.22 Final   • Vitamin B-12 08/10/2018 621  239 - 931 pg/mL Final   • TSH 08/10/2018 0.950  0.460 - 4.680 mIU/mL Final   ]

## 2018-10-05 RX ORDER — AMLODIPINE BESYLATE 5 MG/1
5 TABLET ORAL DAILY
Qty: 30 TABLET | Refills: 11 | Status: SHIPPED | OUTPATIENT
Start: 2018-10-05 | End: 2020-09-16

## 2018-11-02 RX ORDER — TIZANIDINE 4 MG/1
4 TABLET ORAL NIGHTLY PRN
Qty: 30 TABLET | Refills: 5 | Status: SHIPPED | OUTPATIENT
Start: 2018-11-02 | End: 2018-11-19 | Stop reason: SDUPTHER

## 2018-11-19 RX ORDER — TIZANIDINE 4 MG/1
4 TABLET ORAL NIGHTLY PRN
Qty: 30 TABLET | Refills: 5 | Status: SHIPPED | OUTPATIENT
Start: 2018-11-19 | End: 2019-11-12 | Stop reason: SDUPTHER

## 2018-11-28 RX ORDER — ATORVASTATIN CALCIUM 10 MG/1
10 TABLET, FILM COATED ORAL DAILY
Qty: 30 TABLET | Refills: 5 | Status: SHIPPED | OUTPATIENT
Start: 2018-11-28 | End: 2019-03-05 | Stop reason: SDUPTHER

## 2018-12-17 RX ORDER — GABAPENTIN 800 MG/1
TABLET ORAL
Qty: 60 TABLET | Refills: 5 | Status: CANCELLED | OUTPATIENT
Start: 2018-12-17

## 2018-12-18 ENCOUNTER — OFFICE VISIT (OUTPATIENT)
Dept: FAMILY MEDICINE CLINIC | Facility: CLINIC | Age: 63
End: 2018-12-18

## 2018-12-18 VITALS
TEMPERATURE: 98 F | WEIGHT: 182 LBS | HEIGHT: 60 IN | DIASTOLIC BLOOD PRESSURE: 74 MMHG | HEART RATE: 66 BPM | SYSTOLIC BLOOD PRESSURE: 124 MMHG | BODY MASS INDEX: 35.73 KG/M2

## 2018-12-18 DIAGNOSIS — E66.01 CLASS 2 SEVERE OBESITY DUE TO EXCESS CALORIES WITH SERIOUS COMORBIDITY AND BODY MASS INDEX (BMI) OF 35.0 TO 35.9 IN ADULT (HCC): Chronic | ICD-10-CM

## 2018-12-18 DIAGNOSIS — M54.42 CHRONIC BILATERAL LOW BACK PAIN WITH BILATERAL SCIATICA: Primary | Chronic | ICD-10-CM

## 2018-12-18 DIAGNOSIS — M17.0 PRIMARY OSTEOARTHRITIS OF BOTH KNEES: Chronic | ICD-10-CM

## 2018-12-18 DIAGNOSIS — J30.1 SEASONAL ALLERGIC RHINITIS DUE TO POLLEN: ICD-10-CM

## 2018-12-18 DIAGNOSIS — G89.29 CHRONIC BILATERAL LOW BACK PAIN WITH BILATERAL SCIATICA: Primary | Chronic | ICD-10-CM

## 2018-12-18 DIAGNOSIS — I10 ESSENTIAL HYPERTENSION: Chronic | ICD-10-CM

## 2018-12-18 DIAGNOSIS — M54.41 CHRONIC BILATERAL LOW BACK PAIN WITH BILATERAL SCIATICA: Primary | Chronic | ICD-10-CM

## 2018-12-18 PROCEDURE — 99214 OFFICE O/P EST MOD 30 MIN: CPT | Performed by: INTERNAL MEDICINE

## 2018-12-18 RX ORDER — GABAPENTIN 800 MG/1
TABLET ORAL
Qty: 60 TABLET | Refills: 5 | Status: SHIPPED | OUTPATIENT
Start: 2018-12-18 | End: 2019-06-19 | Stop reason: SDUPTHER

## 2018-12-18 RX ORDER — FLUTICASONE PROPIONATE 50 MCG
1 SPRAY, SUSPENSION (ML) NASAL 2 TIMES DAILY
Qty: 1 BOTTLE | Refills: 11 | Status: SHIPPED | OUTPATIENT
Start: 2018-12-18

## 2019-01-02 RX ORDER — ALBUTEROL SULFATE 90 UG/1
AEROSOL, METERED RESPIRATORY (INHALATION)
Qty: 8.5 G | Refills: 1 | Status: SHIPPED | OUTPATIENT
Start: 2019-01-02 | End: 2021-03-15 | Stop reason: SDUPTHER

## 2019-01-17 ENCOUNTER — OFFICE VISIT (OUTPATIENT)
Dept: FAMILY MEDICINE CLINIC | Facility: CLINIC | Age: 64
End: 2019-01-17

## 2019-01-17 VITALS
HEART RATE: 78 BPM | DIASTOLIC BLOOD PRESSURE: 76 MMHG | SYSTOLIC BLOOD PRESSURE: 120 MMHG | OXYGEN SATURATION: 93 % | HEIGHT: 60 IN | TEMPERATURE: 98.8 F | WEIGHT: 179 LBS | BODY MASS INDEX: 35.14 KG/M2

## 2019-01-17 DIAGNOSIS — J06.9 VIRAL URI WITH COUGH: ICD-10-CM

## 2019-01-17 DIAGNOSIS — J06.9 ACUTE URI: Primary | ICD-10-CM

## 2019-01-17 DIAGNOSIS — E66.01 CLASS 2 SEVERE OBESITY DUE TO EXCESS CALORIES WITH SERIOUS COMORBIDITY AND BODY MASS INDEX (BMI) OF 35.0 TO 35.9 IN ADULT (HCC): Chronic | ICD-10-CM

## 2019-01-17 DIAGNOSIS — F02.80 EARLY ONSET ALZHEIMER'S DEMENTIA WITHOUT BEHAVIORAL DISTURBANCE (HCC): Chronic | ICD-10-CM

## 2019-01-17 DIAGNOSIS — H65.03 BILATERAL ACUTE SEROUS OTITIS MEDIA, RECURRENCE NOT SPECIFIED: ICD-10-CM

## 2019-01-17 DIAGNOSIS — M75.52 BURSITIS OF LEFT SHOULDER: ICD-10-CM

## 2019-01-17 DIAGNOSIS — G30.0 EARLY ONSET ALZHEIMER'S DEMENTIA WITHOUT BEHAVIORAL DISTURBANCE (HCC): Chronic | ICD-10-CM

## 2019-01-17 PROCEDURE — 99214 OFFICE O/P EST MOD 30 MIN: CPT | Performed by: INTERNAL MEDICINE

## 2019-01-17 RX ORDER — AZITHROMYCIN 250 MG/1
TABLET, FILM COATED ORAL
Qty: 6 TABLET | Refills: 0 | Status: SHIPPED | OUTPATIENT
Start: 2019-01-17 | End: 2019-02-18

## 2019-01-17 RX ORDER — PHENYLEPHRINE HCL 10 MG/1
TABLET, FILM COATED ORAL
Qty: 36 TABLET | Refills: 0 | Status: SHIPPED | OUTPATIENT
Start: 2019-01-17 | End: 2019-12-06

## 2019-01-17 NOTE — PATIENT INSTRUCTIONS

## 2019-01-17 NOTE — PROGRESS NOTES
Subjective     Andria Goldstein is a 63 y.o. female.     History of Present Illness     Andria reports onset of flulike symptoms approximately 10 days ago.  The symptoms have improved.  She was initially experiencing headache and fever and cough and body aches.  No sick contacts with flu patient's, to her knowledge.  Her symptoms are considerably better, but she has been experiencing right ear pain for the past 3 days.  She has some prescription eardrops at home and has been applying some ear drops to the right ear at bedtime for the past 2 or 3 nights.  She still has an occasional cough.  She denies wheezes.  No facial pain or purulent nasal discharge.  No persistent fevers or chills.    The patient awoke with left anterior shoulder pain this morning, predominantly in the area near the proximal attachment of the left biceps tendon.  She denies any trauma, but does report she tends to sleep on her left side with her left arm curled up under her head.  She has Mobic at home.    The patient reports that it is very likely she is going to be undergoing left total knee replacement next month.  She is going to be receiving the surgery from an orthopedic surgeon in [place].  He has not sent me any information.    Review of Systems   Constitutional: Negative for chills, fatigue and fever.   HENT: Positive for congestion, ear pain, postnasal drip and sore throat. Negative for sinus pressure.    Respiratory: Positive for cough. Negative for shortness of breath and wheezing.    Cardiovascular: Negative for chest pain, palpitations and leg swelling.   Gastrointestinal: Negative for abdominal pain, blood in stool, constipation, diarrhea, nausea and vomiting.   Endocrine: Negative for cold intolerance, heat intolerance, polydipsia and polyuria.   Genitourinary: Negative for dysuria, frequency, hematuria and urgency.   Musculoskeletal: Positive for arthralgias and back pain.        Knee pain   Skin: Negative for rash.  "  Neurological: Negative for syncope and weakness.       Objective     /76 (BP Location: Left arm, Patient Position: Sitting, Cuff Size: Adult)   Pulse 78   Temp 98.8 °F (37.1 °C) (Oral)   Ht 152.4 cm (60\")   Wt 81.2 kg (179 lb)   SpO2 93%   BMI 34.96 kg/m²     Physical Exam   Constitutional: She is oriented to person, place, and time. She appears well-developed and well-nourished. No distress.   Pleasant, obese female with evidence of dementia.  Accompanied by her    HENT:   Head: Normocephalic and atraumatic.   Nose: Nose normal.   Mouth/Throat: Oropharynx is clear and moist. No oropharyngeal exudate.   Nasal mucosa is congested.  Oral mucosa is moist and pink with mild posterior erythema and clear postnasal drip.  Bilateral TMs are injected with small serous effusions.   Eyes: EOM are normal. Pupils are equal, round, and reactive to light.   Neck: Neck supple. No JVD present. No thyromegaly present.   Cardiovascular: Normal rate, regular rhythm and normal heart sounds.   Pulmonary/Chest: Effort normal and breath sounds normal. No accessory muscle usage. No respiratory distress. She has no wheezes. She has no rales.   Abdominal: Soft. Bowel sounds are normal. She exhibits no distension. There is no tenderness.   Obese/overweight abdomen.     Musculoskeletal: She exhibits no edema.   Exam of the left proximal biceps and left shoulder reveal range of motion limitation with flexion.  Tenderness is reproduced palpating near the proximal insertion site of the left biceps.  Mild tenderness low anterior left before meals joint.  Normal shoulder flexion and extension.  No warmth or erythema or dislocation.   Lymphadenopathy:     She has no cervical adenopathy.   Neurological: She is alert and oriented to person, place, and time. No cranial nerve deficit.   Psychiatric: She has a normal mood and affect. Her speech is normal.       PHQ-2/PHQ-9 Depression Screening 1/17/2019   Little interest or pleasure " in doing things 0   Feeling down, depressed, or hopeless 0   Trouble falling or staying asleep, or sleeping too much -   Feeling tired or having little energy -   Poor appetite or overeating -   Feeling bad about yourself - or that you are a failure or have let yourself or your family down -   Trouble concentrating on things, such as reading the newspaper or watching television -   Moving or speaking so slowly that other people could have noticed. Or the opposite - being so fidgety or restless that you have been moving around a lot more than usual -   Thoughts that you would be better off dead, or of hurting yourself in some way -   Total Score 0   If you checked off any problems, how difficult have these problems made it for you to do your work, take care of things at home, or get along with other people? -       Assessment/Plan     Z-Ascencion as directed.  Phenylephrine 10 mg 3 times a day when necessary.    She may use her meloxicam 15 mg daily for a while due to the new problem of left anterior shoulder pain.  Try sleeping on the right side and try to avoid sleeping on the left side until symptoms improve.  Return for further workup if this does not improve.    Pursue aggressive diet, exercise, and weight loss efforts.    Continue the Aricept and Namenda.    Return next week as scheduled.    Diagnoses and all orders for this visit:    Acute URI    Viral URI with cough    Bilateral acute serous otitis media, recurrence not specified    Bursitis of left shoulder    Early onset Alzheimer's dementia without behavioral disturbance    Class 2 severe obesity due to excess calories with serious comorbidity and body mass index (BMI) of 35.0 to 35.9 in adult (CMS/[facility])    Other orders  -     phenylephrine (SUDAFED PE) 10 MG tablet; 1 tablet 2-3 times daily prn congestion  -     azithromycin (ZITHROMAX) 250 MG tablet; Take 2 tablets the first day, then 1 tablet daily for 4 days.        No visits with results within 3 Week(s)  from this visit.   Latest known visit with results is:   Lab on 08/10/2018   Component Date Value Ref Range Status   • Glucose 08/10/2018 108* 74 - 99 mg/dL Final   • BUN 08/10/2018 15  7 - 17 mg/dL Final   • Creatinine 08/10/2018 0.90  0.52 - 1.04 mg/dL Final   • Sodium 08/10/2018 145  137 - 145 mmol/L Final   • Potassium 08/10/2018 4.4  3.4 - 5.0 mmol/L Final   • Chloride 08/10/2018 106  98 - 107 mmol/L Final   • CO2 08/10/2018 31.0* 22.0 - 30.0 mmol/L Final   • Calcium 08/10/2018 9.8  8.4 - 10.2 mg/dL Final   • Total Protein 08/10/2018 7.0  6.3 - 8.2 g/dL Final   • Albumin 08/10/2018 4.20  3.50 - 5.00 g/dL Final   • ALT (SGPT) 08/10/2018 19  <=35 U/L Final   • AST (SGOT) 08/10/2018 23  14 - 36 U/L Final   • Alkaline Phosphatase 08/10/2018 92  38 - 126 U/L Final   • Total Bilirubin 08/10/2018 0.6  0.2 - 1.3 mg/dL Final   • eGFR Non African Amer 08/10/2018 63  45 - 104 mL/min/1.73 Final   • Globulin 08/10/2018 2.8  2.3 - 3.5 gm/dL Final   • A/G Ratio 08/10/2018 1.5  1.1 - 1.8 g/dL Final   • BUN/Creatinine Ratio 08/10/2018 16.7  7.0 - 25.0 Final   • Anion Gap 08/10/2018 8.0  5.0 - 15.0 mmol/L Final   • WBC 08/10/2018 5.81  3.20 - 9.80 10*3/mm3 Final   • RBC 08/10/2018 4.60  3.77 - 5.16 10*6/mm3 Final   • Hemoglobin 08/10/2018 13.4  12.0 - 15.5 g/dL Final   • Hematocrit 08/10/2018 41.2  35.0 - 45.0 % Final   • MCV 08/10/2018 89.6  80.0 - 98.0 fL Final   • MCH 08/10/2018 29.1  26.5 - 34.0 pg Final   • MCHC 08/10/2018 32.5  31.4 - 36.0 g/dL Final   • RDW 08/10/2018 13.3  11.5 - 14.5 % Final   • RDW-SD 08/10/2018 42.4  36.4 - 46.3 fl Final   • MPV 08/10/2018 12.2* 8.0 - 12.0 fL Final   • Platelets 08/10/2018 175  150 - 450 10*3/mm3 Final   • Neutrophil % 08/10/2018 65.6  37.0 - 80.0 % Final   • Lymphocyte % 08/10/2018 22.9  10.0 - 50.0 % Final   • Monocyte % 08/10/2018 8.1  0.0 - 12.0 % Final   • Eosinophil % 08/10/2018 3.1  0.0 - 7.0 % Final   • Basophil % 08/10/2018 0.3  0.0 - 2.0 % Final   • Neutrophils, Absolute  08/10/2018 3.81  2.00 - 8.60 10*3/mm3 Final   • Lymphocytes, Absolute 08/10/2018 1.33  0.60 - 4.20 10*3/mm3 Final   • Monocytes, Absolute 08/10/2018 0.47  0.00 - 0.90 10*3/mm3 Final   • Eosinophils, Absolute 08/10/2018 0.18  0.00 - 0.70 10*3/mm3 Final   • Basophils, Absolute 08/10/2018 0.02  0.00 - 0.20 10*3/mm3 Final   • Hemoglobin A1C 08/10/2018 5.7* 4 - 5.6 % Final   • Total Cholesterol 08/10/2018 177  150 - 200 mg/dL Final   • Triglycerides 08/10/2018 142  <=150 mg/dL Final   • HDL Cholesterol 08/10/2018 61* 40 - 59 mg/dL Final   • LDL Cholesterol  08/10/2018 88  <=100 mg/dL Final   • VLDL Cholesterol 08/10/2018 28.4  mg/dL Final   • LDL/HDL Ratio 08/10/2018 1.44  0.00 - 3.22 Final   • Vitamin B-12 08/10/2018 621  239 - 931 pg/mL Final   • TSH 08/10/2018 0.950  0.460 - 4.680 mIU/mL Final   ]

## 2019-01-22 ENCOUNTER — OFFICE VISIT (OUTPATIENT)
Dept: FAMILY MEDICINE CLINIC | Facility: CLINIC | Age: 64
End: 2019-01-22

## 2019-01-22 VITALS
HEIGHT: 60 IN | SYSTOLIC BLOOD PRESSURE: 136 MMHG | TEMPERATURE: 98.1 F | HEART RATE: 64 BPM | WEIGHT: 182.2 LBS | BODY MASS INDEX: 35.77 KG/M2 | DIASTOLIC BLOOD PRESSURE: 76 MMHG

## 2019-01-22 DIAGNOSIS — I10 ESSENTIAL HYPERTENSION: Chronic | ICD-10-CM

## 2019-01-22 DIAGNOSIS — G30.0 EARLY ONSET ALZHEIMER'S DEMENTIA WITHOUT BEHAVIORAL DISTURBANCE (HCC): Chronic | ICD-10-CM

## 2019-01-22 DIAGNOSIS — K29.30 CHRONIC SUPERFICIAL GASTRITIS WITHOUT BLEEDING: ICD-10-CM

## 2019-01-22 DIAGNOSIS — Z00.00 MEDICARE ANNUAL WELLNESS VISIT, INITIAL: Primary | ICD-10-CM

## 2019-01-22 DIAGNOSIS — F02.80 EARLY ONSET ALZHEIMER'S DEMENTIA WITHOUT BEHAVIORAL DISTURBANCE (HCC): Chronic | ICD-10-CM

## 2019-01-22 DIAGNOSIS — M51.34 DEGENERATION OF THORACIC INTERVERTEBRAL DISC: Chronic | ICD-10-CM

## 2019-01-22 DIAGNOSIS — M17.0 PRIMARY OSTEOARTHRITIS OF BOTH KNEES: Chronic | ICD-10-CM

## 2019-01-22 DIAGNOSIS — M54.16 LUMBAR RADICULOPATHY: Chronic | ICD-10-CM

## 2019-01-22 DIAGNOSIS — R73.01 IMPAIRED FASTING GLUCOSE: Chronic | ICD-10-CM

## 2019-01-22 DIAGNOSIS — M53.86 DISORDER OF LUMBAR SPINE: Chronic | ICD-10-CM

## 2019-01-22 DIAGNOSIS — Z01.818 PRE-OP EVALUATION: ICD-10-CM

## 2019-01-22 PROCEDURE — 99214 OFFICE O/P EST MOD 30 MIN: CPT | Performed by: INTERNAL MEDICINE

## 2019-01-22 PROCEDURE — G0438 PPPS, INITIAL VISIT: HCPCS | Performed by: INTERNAL MEDICINE

## 2019-01-22 RX ORDER — RANITIDINE 150 MG/1
150 TABLET ORAL EVERY EVENING
Qty: 30 TABLET | Refills: 11 | Status: SHIPPED | OUTPATIENT
Start: 2019-01-22 | End: 2019-12-06

## 2019-01-22 NOTE — PROGRESS NOTES
Subjective        History of Present Illness     Andria Goldstein is a 63 y.o. female who comes in for pre-operative evaluation and medical clearance for upcoming total knee arthroplasty. Also, other issues.  She has moderate-severe bilateral knee DJD with meniscal derangement in the left knee demonstrated on MRI.  She has failed conservative treatment and has decided to undergo surgical intervention with a left total knee arthroplasty scheduled to be performed by Dr. Mcduffie on 02/26/19.  Exam of left knee today reveals arthritic changes with medial laxity and crepitance. We reviewed risks of the surgical procedure.  Patient denies chest pain, PND or orthopnea, presyncopal/syncopal episodes, palpitations or significant lower extremity edema.      She continues on Mobic (meloxicam) and daily aspirin.  She is given instructions to stop these medications and avoid other NSAIDs five days prior to her scheduled arthroscopy next month to decrease risks of increased bleeding.      She reports some breakthrough GERD/gastritis symptoms 3-4 times weekly occurring late in the evening despite taking Prilosec each morning.  She takes OTC antacids in addition to the Prilosec, which helps minimally.  I recommended adding Zantac in the evening prior to supper.  She verbalizes understanding.     Review of Systems   Constitutional: Negative for chills, fatigue and fever.   HENT: Negative for congestion, ear pain, postnasal drip, sinus pressure and sore throat.    Respiratory: Negative for cough, shortness of breath and wheezing.    Cardiovascular: Negative for chest pain, palpitations and leg swelling.   Gastrointestinal: Negative for abdominal pain, blood in stool, constipation, diarrhea, nausea and vomiting.   Endocrine: Negative for cold intolerance, heat intolerance, polydipsia and polyuria.   Genitourinary: Negative for dysuria, frequency, hematuria and urgency.   Musculoskeletal:        Left knee pain.   Skin: Negative for  "rash.   Neurological: Negative for syncope and weakness.        Objective     Vitals:    01/22/19 0902   BP: 136/76   Pulse: 64   Temp: 98.1 °F (36.7 °C)   TempSrc: Oral   Weight: 82.6 kg (182 lb 3.2 oz)   Height: 152.4 cm (60\")     Physical Exam   Constitutional: She is oriented to person, place, and time. She appears well-developed and well-nourished. No distress.   Pleasant, obese female with mild evidence of dementia.  Accompanied by her      HENT:   Head: Normocephalic and atraumatic.   Nose: Right sinus exhibits no maxillary sinus tenderness and no frontal sinus tenderness. Left sinus exhibits no maxillary sinus tenderness and no frontal sinus tenderness.   Mouth/Throat: Uvula is midline, oropharynx is clear and moist and mucous membranes are normal. No oral lesions. No tonsillar exudate.   Eyes: Conjunctivae and EOM are normal. Pupils are equal, round, and reactive to light.   Neck: Trachea normal. Neck supple. No JVD present. Carotid bruit is not present. No tracheal deviation present. No thyroid mass and no thyromegaly present.   Cardiovascular: Normal rate, regular rhythm, normal heart sounds and intact distal pulses.  No extrasystoles are present. PMI is not displaced.   No murmur heard.  Pulmonary/Chest: Effort normal and breath sounds normal. No accessory muscle usage. No respiratory distress. She has no decreased breath sounds. She has no wheezes. She has no rhonchi. She has no rales.   Abdominal: Soft. Bowel sounds are normal. She exhibits no distension. There is no hepatosplenomegaly. There is tenderness.   Epigastric tenderness. Obese abdomen   Musculoskeletal:   Exam of left knee reveals arthritic changes with medial laxity and crepitance.        Vascular Status -  Her right foot exhibits normal foot vasculature  and no edema. Her left foot exhibits normal foot vasculature  and no edema.  Lymphadenopathy:     She has no cervical adenopathy.   Neurological: She is alert and oriented to " person, place, and time. No cranial nerve deficit. Coordination normal.   Skin: Skin is warm, dry and intact. No rash noted. No cyanosis. Nails show no clubbing.   Psychiatric: She has a normal mood and affect. Her speech is normal and behavior is normal. Judgment and thought content normal.   Vitals reviewed.    Future Appointments   Date Time Provider Department Center   2/18/2019 11:15 AM Christofer Mckeon MD [facility] None         Assessment/Plan      We reviewed risks associated with her upcoming left knee arthroscopy.  She is given instructions to stop aspirin and Mobic and avoid other NSAIDs five days prior to her scheduled arthroscopy to decrease risks of increased bleeding.   Forms are completed and faxed to [facility] in [place] giving medical clearance for the upcoming left total knee arthroplasty scheduled to be performed by Dr. Mcduffie on 02/26/19    The evening gastritis/GERD symptoms are a new problem. Continue the Prilosec 40 mg q.a.m.  A prescription is sent for Zantac 150 mg to take one tablet each evening prior to supper meal.  She can continue OTC antacids for breakthrough GERD/gastritis symptoms.    Due to the patient's extensive spinal DJD, care will be needed to avoid pain flares with hospital transfers, etc.    Due to her early onset dementia, it is likely that the patient will experience some mental status changes during the postoperative period.  She is increased risk for delerium. Appropriate measures should be taken, but should be manageable.    It is my opinion that this patient is a reasonable operative risk.  She and her  understand the risks and wish to proceed.      Return 02/18/19 for routine follow up with fasting labs one week prior.     Scribed for Dr. Mckeon by Cesilia Gutierrez [facility].     Diagnoses and all orders for this visit:    Medicare annual wellness visit, initial    Chronic superficial gastritis without bleeding    Pre-op  evaluation    Essential hypertension    Impaired fasting glucose    Primary osteoarthritis of both knees    Early onset Alzheimer's dementia without behavioral disturbance    Lumbar radiculopathy    Degeneration of thoracic intervertebral disc    Disorder of lumbar spine    Other orders  -     raNITIdine (ZANTAC) 150 MG tablet; Take 1 tablet by mouth Every Evening. Before supper        No visits with results within 3 Week(s) from this visit.   Latest known visit with results is:   Lab on 08/10/2018   Component Date Value Ref Range Status   • Glucose 08/10/2018 108* 74 - 99 mg/dL Final   • BUN 08/10/2018 15  7 - 17 mg/dL Final   • Creatinine 08/10/2018 0.90  0.52 - 1.04 mg/dL Final   • Sodium 08/10/2018 145  137 - 145 mmol/L Final   • Potassium 08/10/2018 4.4  3.4 - 5.0 mmol/L Final   • Chloride 08/10/2018 106  98 - 107 mmol/L Final   • CO2 08/10/2018 31.0* 22.0 - 30.0 mmol/L Final   • Calcium 08/10/2018 9.8  8.4 - 10.2 mg/dL Final   • Total Protein 08/10/2018 7.0  6.3 - 8.2 g/dL Final   • Albumin 08/10/2018 4.20  3.50 - 5.00 g/dL Final   • ALT (SGPT) 08/10/2018 19  <=35 U/L Final   • AST (SGOT) 08/10/2018 23  14 - 36 U/L Final   • Alkaline Phosphatase 08/10/2018 92  38 - 126 U/L Final   • Total Bilirubin 08/10/2018 0.6  0.2 - 1.3 mg/dL Final   • eGFR Non African Amer 08/10/2018 63  45 - 104 mL/min/1.73 Final   • Globulin 08/10/2018 2.8  2.3 - 3.5 gm/dL Final   • A/G Ratio 08/10/2018 1.5  1.1 - 1.8 g/dL Final   • BUN/Creatinine Ratio 08/10/2018 16.7  7.0 - 25.0 Final   • Anion Gap 08/10/2018 8.0  5.0 - 15.0 mmol/L Final   • WBC 08/10/2018 5.81  3.20 - 9.80 10*3/mm3 Final   • RBC 08/10/2018 4.60  3.77 - 5.16 10*6/mm3 Final   • Hemoglobin 08/10/2018 13.4  12.0 - 15.5 g/dL Final   • Hematocrit 08/10/2018 41.2  35.0 - 45.0 % Final   • MCV 08/10/2018 89.6  80.0 - 98.0 fL Final   • MCH 08/10/2018 29.1  26.5 - 34.0 pg Final   • MCHC 08/10/2018 32.5  31.4 - 36.0 g/dL Final   • RDW 08/10/2018 13.3  11.5 - 14.5 % Final   •  RDW-SD 08/10/2018 42.4  36.4 - 46.3 fl Final   • MPV 08/10/2018 12.2* 8.0 - 12.0 fL Final   • Platelets 08/10/2018 175  150 - 450 10*3/mm3 Final   • Neutrophil % 08/10/2018 65.6  37.0 - 80.0 % Final   • Lymphocyte % 08/10/2018 22.9  10.0 - 50.0 % Final   • Monocyte % 08/10/2018 8.1  0.0 - 12.0 % Final   • Eosinophil % 08/10/2018 3.1  0.0 - 7.0 % Final   • Basophil % 08/10/2018 0.3  0.0 - 2.0 % Final   • Neutrophils, Absolute 08/10/2018 3.81  2.00 - 8.60 10*3/mm3 Final   • Lymphocytes, Absolute 08/10/2018 1.33  0.60 - 4.20 10*3/mm3 Final   • Monocytes, Absolute 08/10/2018 0.47  0.00 - 0.90 10*3/mm3 Final   • Eosinophils, Absolute 08/10/2018 0.18  0.00 - 0.70 10*3/mm3 Final   • Basophils, Absolute 08/10/2018 0.02  0.00 - 0.20 10*3/mm3 Final   • Hemoglobin A1C 08/10/2018 5.7* 4 - 5.6 % Final   • Total Cholesterol 08/10/2018 177  150 - 200 mg/dL Final   • Triglycerides 08/10/2018 142  <=150 mg/dL Final   • HDL Cholesterol 08/10/2018 61* 40 - 59 mg/dL Final   • LDL Cholesterol  08/10/2018 88  <=100 mg/dL Final   • VLDL Cholesterol 08/10/2018 28.4  mg/dL Final   • LDL/HDL Ratio 08/10/2018 1.44  0.00 - 3.22 Final   • Vitamin B-12 08/10/2018 621  239 - 931 pg/mL Final   • TSH 08/10/2018 0.950  0.460 - 4.680 mIU/mL Final   ]

## 2019-01-22 NOTE — PATIENT INSTRUCTIONS
Medicare Wellness  Personal Prevention Plan of Service     Date of Office Visit:  2019  Encounter Provider:  Christofer Mckeon MD  Place of Service:  [facility]  Patient Name: Andria Goldstein  :  1955    As part of the Medicare Wellness portion of your visit today, we are providing you with this personalized preventive plan of services (PPPS). This plan is based upon recommendations of the [place] Preventive Services Task Force (USPSTF) and the Advisory Committee on Immunization Practices (ACIP).    This lists the preventive care services that should be considered, and provides dates of when you are due. Items listed as completed are up-to-date and do not require any further intervention.    Health Maintenance   Topic Date Due   • ZOSTER VACCINE (1 of 2) 2004   • HEPATITIS C SCREENING  2016   • MEDICARE ANNUAL WELLNESS  2016   • PAP SMEAR  2016   • TDAP/TD VACCINES (1 - Tdap) 2016   • DXA SCAN  2018   • MAMMOGRAM  2019   • LIPID PANEL  08/10/2019   • COLONOSCOPY  2026   • INFLUENZA VACCINE  Addressed       No orders of the defined types were placed in this encounter.      Return if symptoms worsen or fail to improve, for Next scheduled follow up.        Exercising to Lose Weight  Exercising can help you to lose weight. In order to lose weight through exercise, you need to do vigorous-intensity exercise. You can tell that you are exercising with vigorous intensity if you are breathing very hard and fast and cannot hold a conversation while exercising.  Moderate-intensity exercise helps to maintain your current weight. You can tell that you are exercising at a moderate level if you have a higher heart rate and faster breathing, but you are still able to hold a conversation.  How often should I exercise?  Choose an activity that you enjoy and set realistic goals. Your health care provider can help you to  make an activity plan that works for you. Exercise regularly as directed by your health care provider. This may include:  · Doing resistance training twice each week, such as:  ? Push-ups.  ? Sit-ups.  ? Lifting weights.  ? Using resistance bands.  · Doing a given intensity of exercise for a given amount of time. Choose from these options:  ? 150 minutes of moderate-intensity exercise every week.  ? 75 minutes of vigorous-intensity exercise every week.  ? A mix of moderate-intensity and vigorous-intensity exercise every week.    Children, pregnant women, people who are out of shape, people who are overweight, and older adults may need to consult a health care provider for individual recommendations. If you have any sort of medical condition, be sure to consult your health care provider before starting a new exercise program.  What are some activities that can help me to lose weight?  · Walking at a rate of at least 4.5 miles an hour.  · Jogging or running at a rate of 5 miles per hour.  · Biking at a rate of at least 10 miles per hour.  · Lap swimming.  · Roller-skating or in-line skating.  · Cross-country skiing.  · Vigorous competitive sports, such as football, basketball, and soccer.  · Jumping rope.  · Aerobic dancing.  How can I be more active in my day-to-day activities?  · Use the stairs instead of the elevator.  · Take a walk during your lunch break.  · If you drive, park your car farther away from work or school.  · If you take public transportation, get off one stop early and walk the rest of the way.  · Make all of your phone calls while standing up and walking around.  · Get up, stretch, and walk around every 30 minutes throughout the day.  What guidelines should I follow while exercising?  · Do not exercise so much that you hurt yourself, feel dizzy, or get very short of breath.  · Consult your health care provider prior to starting a new exercise program.  · Wear comfortable clothes and shoes with good  support.  · Drink plenty of water while you exercise to prevent dehydration or heat stroke. Body water is lost during exercise and must be replaced.  · Work out until you breathe faster and your heart beats faster.  This information is not intended to replace advice given to you by your health care provider. Make sure you discuss any questions you have with your health care provider.  Document Released: 01/20/2012 Document Revised: 05/25/2017 Document Reviewed: 05/21/2015  [employer] Interactive Patient Education © 2018 [employer]    Calorie Counting for Weight Loss  Calories are units of energy. Your body needs a certain amount of calories from food to keep you going throughout the day. When you eat more calories than your body needs, your body stores the extra calories as fat. When you eat fewer calories than your body needs, your body burns fat to get the energy it needs.  Calorie counting means keeping track of how many calories you eat and drink each day. Calorie counting can be helpful if you need to lose weight. If you make sure to eat fewer calories than your body needs, you should lose weight. Ask your health care provider what a healthy weight is for you.  For calorie counting to work, you will need to eat the right number of calories in a day in order to lose a healthy amount of weight per week. A dietitian can help you determine how many calories you need in a day and will give you suggestions on how to reach your calorie goal.  · A healthy amount of weight to lose per week is usually 1-2 lb (0.5-0.9 kg). This usually means that your daily calorie intake should be reduced by 500-750 calories.  · Eating 1,200 - 1,500 calories per day can help most women lose weight.  · Eating 1,500 - 1,800 calories per day can help most men lose weight.    What do I need to know about calorie counting?  In order to meet your daily calorie goal, you will need to:  · Find out how many calories are in each food you would  like to eat. Try to do this before you eat.  · Decide how much of the food you plan to eat.  · Write down what you ate and how many calories it had. Doing this is called keeping a food log.    To successfully lose weight, it is important to balance calorie counting with a healthy lifestyle that includes regular activity. Aim for 150 minutes of moderate exercise (such as walking) or 75 minutes of vigorous exercise (such as running) each week.  Where do I find calorie information?    The number of calories in a food can be found on a Nutrition Facts label. If a food does not have a Nutrition Facts label, try to look up the calories online or ask your dietitian for help.  Remember that calories are listed per serving. If you choose to have more than one serving of a food, you will have to multiply the calories per serving by the amount of servings you plan to eat. For example, the label on a package of bread might say that a serving size is 1 slice and that there are 90 calories in a serving. If you eat 1 slice, you will have eaten 90 calories. If you eat 2 slices, you will have eaten 180 calories.  How do I keep a food log?  Immediately after each meal, record the following information in your food log:  · What you ate. Don't forget to include toppings, sauces, and other extras on the food.  · How much you ate. This can be measured in cups, ounces, or number of items.  · How many calories each food and drink had.  · The total number of calories in the meal.    Keep your food log near you, such as in a small notebook in your pocket, or use a mobile allegra or website. Some programs will calculate calories for you and show you how many calories you have left for the day to meet your goal.  What are some calorie counting tips?  · Use your calories on foods and drinks that will fill you up and not leave you hungry:  ? Some examples of foods that fill you up are nuts and nut butters, vegetables, lean proteins, and high-fiber  "foods like whole grains. High-fiber foods are foods with more than 5 g fiber per serving.  ? Drinks such as sodas, specialty coffee drinks, alcohol, and juices have a lot of calories, yet do not fill you up.  · Eat nutritious foods and avoid empty calories. Empty calories are calories you get from foods or beverages that do not have many vitamins or protein, such as candy, sweets, and soda. It is better to have a nutritious high-calorie food (such as an avocado) than a food with few nutrients (such as a bag of chips).  · Know how many calories are in the foods you eat most often. This will help you calculate calorie counts faster.  · Pay attention to calories in drinks. Low-calorie drinks include water and unsweetened drinks.  · Pay attention to nutrition labels for \"low fat\" or \"fat free\" foods. These foods sometimes have the same amount of calories or more calories than the full fat versions. They also often have added sugar, starch, or salt, to make up for flavor that was removed with the fat.  · Find a way of tracking calories that works for you. Get creative. Try different apps or programs if writing down calories does not work for you.  What are some portion control tips?  · Know how many calories are in a serving. This will help you know how many servings of a certain food you can have.  · Use a measuring cup to measure serving sizes. You could also try weighing out portions on a kitchen scale. With time, you will be able to estimate serving sizes for some foods.  · Take some time to put servings of different foods on your favorite plates, bowls, and cups so you know what a serving looks like.  · Try not to eat straight from a bag or box. Doing this can lead to overeating. Put the amount you would like to eat in a cup or on a plate to make sure you are eating the right portion.  · Use smaller plates, glasses, and bowls to prevent overeating.  · Try not to multitask (for example, watch TV or use your computer) " while eating. If it is time to eat, sit down at a table and enjoy your food. This will help you to know when you are full. It will also help you to be aware of what you are eating and how much you are eating.  What are tips for following this plan?  Reading food labels  · Check the calorie count compared to the serving size. The serving size may be smaller than what you are used to eating.  · Check the source of the calories. Make sure the food you are eating is high in vitamins and protein and low in saturated and trans fats.  Shopping  · Read nutrition labels while you shop. This will help you make healthy decisions before you decide to purchase your food.  · Make a grocery list and stick to it.  Cooking  · Try to cook your favorite foods in a healthier way. For example, try baking instead of frying.  · Use low-fat dairy products.  Meal planning  · Use more fruits and vegetables. Half of your plate should be fruits and vegetables.  · Include lean proteins like poultry and fish.  How do I count calories when eating out?  · Ask for smaller portion sizes.  · Consider sharing an entree and sides instead of getting your own entree.  · If you get your own entree, eat only half. Ask for a box at the beginning of your meal and put the rest of your entree in it so you are not tempted to eat it.  · If calories are listed on the menu, choose the lower calorie options.  · Choose dishes that include vegetables, fruits, whole grains, low-fat dairy products, and lean protein.  · Choose items that are boiled, broiled, grilled, or steamed. Stay away from items that are buttered, battered, fried, or served with cream sauce. Items labeled “crispy” are usually fried, unless stated otherwise.  · Choose water, low-fat milk, unsweetened iced tea, or other drinks without added sugar. If you want an alcoholic beverage, choose a lower calorie option such as a glass of wine or light beer.  · Ask for dressings, sauces, and syrups on the  side. These are usually high in calories, so you should limit the amount you eat.  · If you want a salad, choose a garden salad and ask for grilled meats. Avoid extra toppings like saleem, cheese, or fried items. Ask for the dressing on the side, or ask for olive oil and vinegar or lemon to use as dressing.  · Estimate how many servings of a food you are given. For example, a serving of cooked rice is ½ cup or about the size of half a baseball. Knowing serving sizes will help you be aware of how much food you are eating at restaurants. The list below tells you how big or small some common portion sizes are based on everyday objects:  ? 1 oz--4 stacked dice.  ? 3 oz--1 deck of cards.  ? 1 tsp--1 die.  ? 1 Tbsp--½ a ping-pong ball.  ? 2 Tbsp--1 ping-pong ball.  ? ½ cup--½ baseball.  ? 1 cup--1 baseball.  Summary  · Calorie counting means keeping track of how many calories you eat and drink each day. If you eat fewer calories than your body needs, you should lose weight.  · A healthy amount of weight to lose per week is usually 1-2 lb (0.5-0.9 kg). This usually means reducing your daily calorie intake by 500-750 calories.  · The number of calories in a food can be found on a Nutrition Facts label. If a food does not have a Nutrition Facts label, try to look up the calories online or ask your dietitian for help.  · Use your calories on foods and drinks that will fill you up, and not on foods and drinks that will leave you hungry.  · Use smaller plates, glasses, and bowls to prevent overeating.  This information is not intended to replace advice given to you by your health care provider. Make sure you discuss any questions you have with your health care provider.  Document Released: 12/18/2006 Document Revised: 11/17/2017 Document Reviewed: 11/17/2017  [employer] Interactive Patient Education © 2018 [employer]

## 2019-01-22 NOTE — PROGRESS NOTES
QUICK REFERENCE INFORMATION:  The ABCs of the Annual Wellness Visit    Initial Medicare Wellness Visit    HEALTH RISK ASSESSMENT    1955    Recent Hospitalizations:  No hospitalization(s) within the last year..        Current Medical Providers:  Patient Care Team:  Christofer Mckeon MD as PCP - General (Internal Medicine)        Smoking Status:  Social History     Tobacco Use   Smoking Status Never Smoker   Smokeless Tobacco Never Used   Tobacco Comment    Tobacco reviewed with patient 5/26/2016       Alcohol Consumption:  Social History     Substance and Sexual Activity   Alcohol Use No       Depression Screen:   PHQ-2/PHQ-9 Depression Screening 1/22/2019   Little interest or pleasure in doing things 0   Feeling down, depressed, or hopeless 0   Trouble falling or staying asleep, or sleeping too much 0   Feeling tired or having little energy 0   Poor appetite or overeating 0   Feeling bad about yourself - or that you are a failure or have let yourself or your family down 0   Trouble concentrating on things, such as reading the newspaper or watching television 0   Moving or speaking so slowly that other people could have noticed. Or the opposite - being so fidgety or restless that you have been moving around a lot more than usual 0   Thoughts that you would be better off dead, or of hurting yourself in some way 0   Total Score 0   If you checked off any problems, how difficult have these problems made it for you to do your work, take care of things at home, or get along with other people? -       Health Habits and Functional and Cognitive Screening:  Functional & Cognitive Status 1/22/2019   Do you have difficulty preparing food and eating? No   Do you have difficulty bathing yourself, getting dressed or grooming yourself? No   Do you have difficulty using the toilet? No   Do you have difficulty moving around from place to place? Yes   Do you have trouble with steps or getting out of a bed or a chair? Yes   In the  past year have you fallen or experienced a near fall? Yes   Current Diet Well Balanced Diet   Dental Exam Not up to date   Eye Exam Not up to date   Exercise (times per week) 7 times per week   Current Exercise Activities Include Housecleaning   Do you need help using the phone?  No   Are you deaf or do you have serious difficulty hearing?  No   Do you need help with transportation? No   Do you need help shopping? No   Do you need help preparing meals?  No   Do you need help with housework?  No   Do you need help with laundry? No   Do you need help taking your medications? No   Do you need help managing money? No   Do you ever drive or ride in a car without wearing a seat belt? No   Have you felt unusual stress, anger or loneliness in the last month? Yes   Who do you live with? Spouse   If you need help, do you have trouble finding someone available to you? No   Have you been bothered in the last four weeks by sexual problems? No   Do you have difficulty concentrating, remembering or making decisions? Yes           Does the patient have evidence of cognitive impairment? Yes    Asiprin use counseling: Taking ASA appropriately as indicated      Recent Lab Results:    Visual Acuity:  No exam data present    Age-appropriate Screening Schedule:  Refer to the list below for future screening recommendations based on patient's age, sex and/or medical conditions. Orders for these recommended tests are listed in the plan section. The patient has been provided with a written plan.    Health Maintenance   Topic Date Due   • ZOSTER VACCINE (1 of 2) 03/30/2004   • PAP SMEAR  08/08/2016   • TDAP/TD VACCINES (1 - Tdap) 12/03/2016   • DXA SCAN  09/13/2018   • MAMMOGRAM  02/01/2019   • LIPID PANEL  08/10/2019   • COLONOSCOPY  06/20/2026   • INFLUENZA VACCINE  Addressed        Subjective   History of Present Illness    Andria Goldstein is a 63 y.o. female who presents for an Annual Wellness Visit.    The following portions of the  patient's history were reviewed and updated as appropriate:   She  has a past medical history of Actinic keratosis, Cerebrovascular disease, Chronic bilateral low back pain with left-sided sciatica, Chronic headache disorder, Degeneration of cervical intervertebral disc, Degeneration of thoracic intervertebral disc, Diarrhea, Disc disorder of lumbar region, Disorder of lumbar spine, Essential hypertension, Gastritis, Gastroesophageal reflux disease, History of colon polyps, History of colonoscopy (06/20/2016), History of mammogram (01/25/2016), Hyperlipidemia, Impaired fasting glucose, Irritable bowel syndrome, Low back pain, Lumbar radiculopathy, Malaise and fatigue, Megaloblastic anemia due to vitamin B12 deficiency, Melena, Mild intermittent asthma with acute exacerbation, Mild intermittent asthma without complication, Mixed hyperlipidemia, Obesity, Osteoarthritis, Osteoarthritis of knees, bilateral, Spasm of back muscles, Umbilical hernia without obstruction or gangrene, and Vaginitis and vulvovaginitis.  She does not have any pertinent problems on file.  She  has a past surgical history that includes Appendectomy; Cholecystectomy; Lumbar spine surgery (2008); Injection of Medication (03/17/2016); Other surgical history (01/25/2016); Injection of Medication (03/17/2016); and Colonoscopy (06/20/2016).  Her family history includes Breast cancer in her cousin; Cancer in her cousin; Colon cancer in her cousin; Diabetes in her cousin; Heart disease in her cousin; Hypertension in her cousin.  She  reports that  has never smoked. she has never used smokeless tobacco. She reports that she does not drink alcohol or use drugs.  Current Outpatient Medications   Medication Sig Dispense Refill   • albuterol sulfate HFA (PROAIR HFA) 108 (90 Base) MCG/ACT inhaler 2 puffs qid prn 8.5 g 1   • amLODIPine (NORVASC) 5 MG tablet Take 1 tablet by mouth Daily. 30 tablet 11   • aspirin 81 MG tablet Take 81 mg by mouth daily.     •  atorvastatin (LIPITOR) 10 MG tablet Take 1 tablet by mouth Daily. 30 tablet 5   • azithromycin (ZITHROMAX) 250 MG tablet Take 2 tablets the first day, then 1 tablet daily for 4 days. 6 tablet 0   • carvedilol (COREG) 6.25 MG tablet Take 1 tablet by mouth 2 (Two) Times a Day With Meals. 60 tablet 11   • donepezil (ARICEPT) 10 MG tablet Take 1 tablet by mouth Every Night. 30 tablet 11   • fluticasone (FLONASE) 50 MCG/ACT nasal spray 1 spray into the nostril(s) as directed by provider 2 (Two) Times a Day. Administer 1 spray in each nostril twice daily. 1 bottle 11   • gabapentin (NEURONTIN) 800 MG tablet TAKE 1/2 TABLET IN THE MORNING AND AT NOON AND 1 BY MOUTH EVERY NIGHT AT BEDTIME FOR CHRONIC BACK PAIN 60 tablet 5   • lisinopril-hydrochlorothiazide (PRINZIDE,ZESTORETIC) 20-12.5 MG per tablet TAKE 1 TABLET BY MOUTH EVERY MORNING. FOR BLOOD PRESSURE. 30 tablet 11   • meloxicam (MOBIC) 15 MG tablet Take 1 tablet by mouth Daily As Needed (leg, back, or knee pain). Take with food 30 tablet 0   • memantine (NAMENDA) 10 MG tablet Take 1 tablet by mouth 2 (Two) Times a Day. 60 tablet 11   • Multiple Vitamins-Minerals (CENTRUM SILVER PO) Take 1 tablet by mouth Daily.     • omeprazole (priLOSEC) 40 MG capsule Take 1 capsule by mouth Every Morning. 30 capsule 11   • phenylephrine (SUDAFED PE) 10 MG tablet 1 tablet 2-3 times daily prn congestion 36 tablet 0   • tiZANidine (ZANAFLEX) 4 MG tablet Take 1 tablet by mouth At Night As Needed for Muscle Spasms. 30 tablet 5   • venlafaxine XR (EFFEXOR-XR) 150 MG 24 hr capsule Take 1 capsule by mouth Daily. 30 capsule 11   • vitamin B-12 (CYANOCOBALAMIN) 500 MCG tablet Take  by mouth. Take 1 tablet every other day.     • raNITIdine (ZANTAC) 150 MG tablet Take 1 tablet by mouth Every Evening. Before supper 30 tablet 11     No current facility-administered medications for this visit.      Current Outpatient Medications on File Prior to Visit   Medication Sig   • albuterol sulfate HFA  (PROAIR HFA) 108 (90 Base) MCG/ACT inhaler 2 puffs qid prn   • amLODIPine (NORVASC) 5 MG tablet Take 1 tablet by mouth Daily.   • aspirin 81 MG tablet Take 81 mg by mouth daily.   • atorvastatin (LIPITOR) 10 MG tablet Take 1 tablet by mouth Daily.   • azithromycin (ZITHROMAX) 250 MG tablet Take 2 tablets the first day, then 1 tablet daily for 4 days.   • carvedilol (COREG) 6.25 MG tablet Take 1 tablet by mouth 2 (Two) Times a Day With Meals.   • donepezil (ARICEPT) 10 MG tablet Take 1 tablet by mouth Every Night.   • fluticasone (FLONASE) 50 MCG/ACT nasal spray 1 spray into the nostril(s) as directed by provider 2 (Two) Times a Day. Administer 1 spray in each nostril twice daily.   • gabapentin (NEURONTIN) 800 MG tablet TAKE 1/2 TABLET IN THE MORNING AND AT NOON AND 1 BY MOUTH EVERY NIGHT AT BEDTIME FOR CHRONIC BACK PAIN   • lisinopril-hydrochlorothiazide (PRINZIDE,ZESTORETIC) 20-12.5 MG per tablet TAKE 1 TABLET BY MOUTH EVERY MORNING. FOR BLOOD PRESSURE.   • meloxicam (MOBIC) 15 MG tablet Take 1 tablet by mouth Daily As Needed (leg, back, or knee pain). Take with food   • memantine (NAMENDA) 10 MG tablet Take 1 tablet by mouth 2 (Two) Times a Day.   • Multiple Vitamins-Minerals (CENTRUM SILVER PO) Take 1 tablet by mouth Daily.   • omeprazole (priLOSEC) 40 MG capsule Take 1 capsule by mouth Every Morning.   • phenylephrine (SUDAFED PE) 10 MG tablet 1 tablet 2-3 times daily prn congestion   • tiZANidine (ZANAFLEX) 4 MG tablet Take 1 tablet by mouth At Night As Needed for Muscle Spasms.   • venlafaxine XR (EFFEXOR-XR) 150 MG 24 hr capsule Take 1 capsule by mouth Daily.   • vitamin B-12 (CYANOCOBALAMIN) 500 MCG tablet Take  by mouth. Take 1 tablet every other day.   • [DISCONTINUED] lansoprazole (PREVACID) 30 MG capsule Take  by mouth Daily.     No current facility-administered medications on file prior to visit.      She is allergic to iodinated diagnostic agents and latex..    Outpatient Medications Prior to Visit    Medication Sig Dispense Refill   • albuterol sulfate HFA (PROAIR HFA) 108 (90 Base) MCG/ACT inhaler 2 puffs qid prn 8.5 g 1   • amLODIPine (NORVASC) 5 MG tablet Take 1 tablet by mouth Daily. 30 tablet 11   • aspirin 81 MG tablet Take 81 mg by mouth daily.     • atorvastatin (LIPITOR) 10 MG tablet Take 1 tablet by mouth Daily. 30 tablet 5   • azithromycin (ZITHROMAX) 250 MG tablet Take 2 tablets the first day, then 1 tablet daily for 4 days. 6 tablet 0   • carvedilol (COREG) 6.25 MG tablet Take 1 tablet by mouth 2 (Two) Times a Day With Meals. 60 tablet 11   • donepezil (ARICEPT) 10 MG tablet Take 1 tablet by mouth Every Night. 30 tablet 11   • fluticasone (FLONASE) 50 MCG/ACT nasal spray 1 spray into the nostril(s) as directed by provider 2 (Two) Times a Day. Administer 1 spray in each nostril twice daily. 1 bottle 11   • gabapentin (NEURONTIN) 800 MG tablet TAKE 1/2 TABLET IN THE MORNING AND AT NOON AND 1 BY MOUTH EVERY NIGHT AT BEDTIME FOR CHRONIC BACK PAIN 60 tablet 5   • lisinopril-hydrochlorothiazide (PRINZIDE,ZESTORETIC) 20-12.5 MG per tablet TAKE 1 TABLET BY MOUTH EVERY MORNING. FOR BLOOD PRESSURE. 30 tablet 11   • meloxicam (MOBIC) 15 MG tablet Take 1 tablet by mouth Daily As Needed (leg, back, or knee pain). Take with food 30 tablet 0   • memantine (NAMENDA) 10 MG tablet Take 1 tablet by mouth 2 (Two) Times a Day. 60 tablet 11   • Multiple Vitamins-Minerals (CENTRUM SILVER PO) Take 1 tablet by mouth Daily.     • omeprazole (priLOSEC) 40 MG capsule Take 1 capsule by mouth Every Morning. 30 capsule 11   • phenylephrine (SUDAFED PE) 10 MG tablet 1 tablet 2-3 times daily prn congestion 36 tablet 0   • tiZANidine (ZANAFLEX) 4 MG tablet Take 1 tablet by mouth At Night As Needed for Muscle Spasms. 30 tablet 5   • venlafaxine XR (EFFEXOR-XR) 150 MG 24 hr capsule Take 1 capsule by mouth Daily. 30 capsule 11   • vitamin B-12 (CYANOCOBALAMIN) 500 MCG tablet Take  by mouth. Take 1 tablet every other day.     •  lansoprazole (PREVACID) 30 MG capsule Take  by mouth Daily.       No facility-administered medications prior to visit.        Patient Active Problem List   Diagnosis   • Vaginitis and vulvovaginitis   • Umbilical hernia without obstruction or gangrene   • Spasm of back muscles   • Osteoarthritis of knees, bilateral   • Osteoarthritis   • Obesity   • Mild intermittent asthma without complication   • Melena   • Megaloblastic anemia due to vitamin B12 deficiency   • Malaise and fatigue   • Lumbar radiculopathy   • Chronic bilateral low back pain with bilateral sciatica   • Irritable bowel syndrome   • Impaired fasting glucose   • Mixed hyperlipidemia   • History of mammogram   • History of colonoscopy   • History of colon polyps   • Generalized anxiety disorder   • Generalized abdominal pain   • Gastroesophageal reflux disease   • Gastritis   • Essential hypertension   • Disorder of lumbar spine   • Disc disorder of lumbar region   • Diarrhea   • Degeneration of thoracic intervertebral disc   • Degeneration of cervical intervertebral disc   • Chronic headache disorder   • Cerebrovascular disease   • Candidiasis   • Actinic keratosis   • Early onset Alzheimer's dementia without behavioral disturbance   • Clostridium difficile diarrhea- h/o   • Postcholecystectomy diarrhea   • Recurrent major depressive disorder, in partial remission (CMS/HCC)   • Irritable bowel syndrome with diarrhea   • Trigger thumb of right hand   • Dysfunction of right eustachian tube       Advance Care Planning:  has NO advance directive - information provided to the patient today    Identification of Risk Factors:  Risk factors include: weight , unhealthy diet, increased fall risk, chronic pain and cognitive impairment.    Review of Systems    Compared to one year ago, the patient feels her physical health is better.  Compared to one year ago, the patient feels her mental health is better.    Objective     Physical Exam    Vitals:    01/22/19  "0902   BP: 136/76   Pulse: 64   Temp: 98.1 °F (36.7 °C)   TempSrc: Oral   Weight: 82.6 kg (182 lb 3.2 oz)   Height: 152.4 cm (60\")   PainSc:   8   PainLoc: Knee       Patient's Body mass index is 35.58 kg/m². BMI is above normal parameters. Recommendations include: educational material, exercise counseling and nutrition counseling.      Assessment/Plan   Patient Self-Management and Personalized Health Advice  The patient has been provided with information about: diet, exercise and designing advance directives and preventive services including:   · Advance directive, Fall Risk assessment done, Nutrition counseling provided.    Visit Diagnoses:    ICD-10-CM ICD-9-CM   1. Medicare annual wellness visit, initial Z00.00 V70.0   2. Chronic superficial gastritis without bleeding K29.30 535.10   3. Pre-op evaluation Z01.818 V72.84   4. Essential hypertension I10 401.9   5. Impaired fasting glucose R73.01 790.21   6. Primary osteoarthritis of both knees M17.0 715.16   7. Early onset Alzheimer's dementia without behavioral disturbance G30.0 331.0    F02.80 294.10   8. Lumbar radiculopathy M54.16 724.4   9. Degeneration of thoracic intervertebral disc M51.34 722.51   10. Disorder of lumbar spine M53.9 724.9       No orders of the defined types were placed in this encounter.      Outpatient Encounter Medications as of 1/22/2019   Medication Sig Dispense Refill   • albuterol sulfate HFA (PROAIR HFA) 108 (90 Base) MCG/ACT inhaler 2 puffs qid prn 8.5 g 1   • amLODIPine (NORVASC) 5 MG tablet Take 1 tablet by mouth Daily. 30 tablet 11   • aspirin 81 MG tablet Take 81 mg by mouth daily.     • atorvastatin (LIPITOR) 10 MG tablet Take 1 tablet by mouth Daily. 30 tablet 5   • azithromycin (ZITHROMAX) 250 MG tablet Take 2 tablets the first day, then 1 tablet daily for 4 days. 6 tablet 0   • carvedilol (COREG) 6.25 MG tablet Take 1 tablet by mouth 2 (Two) Times a Day With Meals. 60 tablet 11   • donepezil (ARICEPT) 10 MG tablet Take 1 " tablet by mouth Every Night. 30 tablet 11   • fluticasone (FLONASE) 50 MCG/ACT nasal spray 1 spray into the nostril(s) as directed by provider 2 (Two) Times a Day. Administer 1 spray in each nostril twice daily. 1 bottle 11   • gabapentin (NEURONTIN) 800 MG tablet TAKE 1/2 TABLET IN THE MORNING AND AT NOON AND 1 BY MOUTH EVERY NIGHT AT BEDTIME FOR CHRONIC BACK PAIN 60 tablet 5   • lisinopril-hydrochlorothiazide (PRINZIDE,ZESTORETIC) 20-12.5 MG per tablet TAKE 1 TABLET BY MOUTH EVERY MORNING. FOR BLOOD PRESSURE. 30 tablet 11   • meloxicam (MOBIC) 15 MG tablet Take 1 tablet by mouth Daily As Needed (leg, back, or knee pain). Take with food 30 tablet 0   • memantine (NAMENDA) 10 MG tablet Take 1 tablet by mouth 2 (Two) Times a Day. 60 tablet 11   • Multiple Vitamins-Minerals (CENTRUM SILVER PO) Take 1 tablet by mouth Daily.     • omeprazole (priLOSEC) 40 MG capsule Take 1 capsule by mouth Every Morning. 30 capsule 11   • phenylephrine (SUDAFED PE) 10 MG tablet 1 tablet 2-3 times daily prn congestion 36 tablet 0   • tiZANidine (ZANAFLEX) 4 MG tablet Take 1 tablet by mouth At Night As Needed for Muscle Spasms. 30 tablet 5   • venlafaxine XR (EFFEXOR-XR) 150 MG 24 hr capsule Take 1 capsule by mouth Daily. 30 capsule 11   • vitamin B-12 (CYANOCOBALAMIN) 500 MCG tablet Take  by mouth. Take 1 tablet every other day.     • [DISCONTINUED] lansoprazole (PREVACID) 30 MG capsule Take  by mouth Daily.     • raNITIdine (ZANTAC) 150 MG tablet Take 1 tablet by mouth Every Evening. Before supper 30 tablet 11     No facility-administered encounter medications on file as of 1/22/2019.        Reviewed use of high risk medication in the elderly: not applicable  Reviewed for potential of harmful drug interactions in the elderly: not applicable    Follow Up:  Return if symptoms worsen or fail to improve, for Next scheduled follow up.     An After Visit Summary and PPPS with all of these plans were given to the patient.

## 2019-02-13 ENCOUNTER — LAB (OUTPATIENT)
Dept: LAB | Facility: OTHER | Age: 64
End: 2019-02-13

## 2019-02-13 DIAGNOSIS — E78.2 MIXED HYPERLIPIDEMIA: Chronic | ICD-10-CM

## 2019-02-13 DIAGNOSIS — I10 ESSENTIAL HYPERTENSION: Chronic | ICD-10-CM

## 2019-02-13 DIAGNOSIS — D53.1 MEGALOBLASTIC ANEMIA DUE TO VITAMIN B12 DEFICIENCY: Chronic | ICD-10-CM

## 2019-02-13 LAB
ALBUMIN SERPL-MCNC: 4.3 G/DL (ref 3.5–5)
ALBUMIN/GLOB SERPL: 1.7 G/DL (ref 1.1–1.8)
ALP SERPL-CCNC: 86 U/L (ref 38–126)
ALT SERPL W P-5'-P-CCNC: 17 U/L
ANION GAP SERPL CALCULATED.3IONS-SCNC: 4 MMOL/L (ref 5–15)
AST SERPL-CCNC: 23 U/L (ref 14–36)
BASOPHILS # BLD AUTO: 0.02 10*3/MM3 (ref 0–0.2)
BASOPHILS NFR BLD AUTO: 0.3 % (ref 0–2)
BILIRUB SERPL-MCNC: 0.7 MG/DL (ref 0.2–1.3)
BUN BLD-MCNC: 12 MG/DL (ref 7–17)
BUN/CREAT SERPL: 15.4 (ref 7–25)
CALCIUM SPEC-SCNC: 9.6 MG/DL (ref 8.4–10.2)
CHLORIDE SERPL-SCNC: 105 MMOL/L (ref 98–107)
CHOLEST SERPL-MCNC: 159 MG/DL (ref 150–200)
CO2 SERPL-SCNC: 35 MMOL/L (ref 22–30)
CREAT BLD-MCNC: 0.78 MG/DL (ref 0.52–1.04)
DEPRECATED RDW RBC AUTO: 45.5 FL (ref 36.4–46.3)
EOSINOPHIL # BLD AUTO: 0.16 10*3/MM3 (ref 0–0.7)
EOSINOPHIL NFR BLD AUTO: 2.3 % (ref 0–7)
ERYTHROCYTE [DISTWIDTH] IN BLOOD BY AUTOMATED COUNT: 14 % (ref 11.5–14.5)
GFR SERPL CREATININE-BSD FRML MDRD: 74 ML/MIN/1.73 (ref 45–104)
GLOBULIN UR ELPH-MCNC: 2.6 GM/DL (ref 2.3–3.5)
GLUCOSE BLD-MCNC: 100 MG/DL (ref 74–99)
HCT VFR BLD AUTO: 42.7 % (ref 35–45)
HDLC SERPL-MCNC: 70 MG/DL (ref 40–59)
HGB BLD-MCNC: 13.4 G/DL (ref 12–15.5)
LDLC SERPL CALC-MCNC: 64 MG/DL
LDLC/HDLC SERPL: 0.91 {RATIO} (ref 0–3.22)
LYMPHOCYTES # BLD AUTO: 1.6 10*3/MM3 (ref 0.6–4.2)
LYMPHOCYTES NFR BLD AUTO: 23.1 % (ref 10–50)
MCH RBC QN AUTO: 28.3 PG (ref 26.5–34)
MCHC RBC AUTO-ENTMCNC: 31.4 G/DL (ref 31.4–36)
MCV RBC AUTO: 90.3 FL (ref 80–98)
MONOCYTES # BLD AUTO: 0.47 10*3/MM3 (ref 0–0.9)
MONOCYTES NFR BLD AUTO: 6.8 % (ref 0–12)
NEUTROPHILS # BLD AUTO: 4.69 10*3/MM3 (ref 2–8.6)
NEUTROPHILS NFR BLD AUTO: 67.5 % (ref 37–80)
PLATELET # BLD AUTO: 197 10*3/MM3 (ref 150–450)
PMV BLD AUTO: 11.5 FL (ref 8–12)
POTASSIUM BLD-SCNC: 4.5 MMOL/L (ref 3.4–5)
PROT SERPL-MCNC: 6.9 G/DL (ref 6.3–8.2)
RBC # BLD AUTO: 4.73 10*6/MM3 (ref 3.77–5.16)
SODIUM BLD-SCNC: 144 MMOL/L (ref 137–145)
TRIGL SERPL-MCNC: 125 MG/DL
VIT B12 BLD-MCNC: 423 PG/ML (ref 239–931)
VLDLC SERPL-MCNC: 25 MG/DL
WBC NRBC COR # BLD: 6.94 10*3/MM3 (ref 3.2–9.8)

## 2019-02-13 PROCEDURE — 85025 COMPLETE CBC W/AUTO DIFF WBC: CPT | Performed by: INTERNAL MEDICINE

## 2019-02-13 PROCEDURE — 36415 COLL VENOUS BLD VENIPUNCTURE: CPT | Performed by: INTERNAL MEDICINE

## 2019-02-13 PROCEDURE — 82607 VITAMIN B-12: CPT | Performed by: INTERNAL MEDICINE

## 2019-02-13 PROCEDURE — 80061 LIPID PANEL: CPT | Performed by: INTERNAL MEDICINE

## 2019-02-13 PROCEDURE — 80053 COMPREHEN METABOLIC PANEL: CPT | Performed by: INTERNAL MEDICINE

## 2019-02-18 ENCOUNTER — OFFICE VISIT (OUTPATIENT)
Dept: FAMILY MEDICINE CLINIC | Facility: CLINIC | Age: 64
End: 2019-02-18

## 2019-02-18 VITALS
HEIGHT: 60 IN | WEIGHT: 181.8 LBS | DIASTOLIC BLOOD PRESSURE: 76 MMHG | BODY MASS INDEX: 35.69 KG/M2 | SYSTOLIC BLOOD PRESSURE: 138 MMHG | HEART RATE: 60 BPM | TEMPERATURE: 97.9 F

## 2019-02-18 DIAGNOSIS — F41.1 GENERALIZED ANXIETY DISORDER: Chronic | ICD-10-CM

## 2019-02-18 DIAGNOSIS — E78.2 MIXED HYPERLIPIDEMIA: Chronic | ICD-10-CM

## 2019-02-18 DIAGNOSIS — E66.01 CLASS 2 SEVERE OBESITY DUE TO EXCESS CALORIES WITH SERIOUS COMORBIDITY AND BODY MASS INDEX (BMI) OF 35.0 TO 35.9 IN ADULT (HCC): Chronic | ICD-10-CM

## 2019-02-18 DIAGNOSIS — F02.80 EARLY ONSET ALZHEIMER'S DEMENTIA WITHOUT BEHAVIORAL DISTURBANCE (HCC): Chronic | ICD-10-CM

## 2019-02-18 DIAGNOSIS — F33.41 RECURRENT MAJOR DEPRESSIVE DISORDER, IN PARTIAL REMISSION (HCC): Chronic | ICD-10-CM

## 2019-02-18 DIAGNOSIS — R73.01 IMPAIRED FASTING GLUCOSE: Chronic | ICD-10-CM

## 2019-02-18 DIAGNOSIS — K91.89 POSTCHOLECYSTECTOMY DIARRHEA: Chronic | ICD-10-CM

## 2019-02-18 DIAGNOSIS — R19.7 POSTCHOLECYSTECTOMY DIARRHEA: Chronic | ICD-10-CM

## 2019-02-18 DIAGNOSIS — I10 ESSENTIAL HYPERTENSION: Primary | Chronic | ICD-10-CM

## 2019-02-18 DIAGNOSIS — I67.9 CEREBROVASCULAR DISEASE: Chronic | ICD-10-CM

## 2019-02-18 DIAGNOSIS — G30.0 EARLY ONSET ALZHEIMER'S DEMENTIA WITHOUT BEHAVIORAL DISTURBANCE (HCC): Chronic | ICD-10-CM

## 2019-02-18 DIAGNOSIS — K29.30 CHRONIC SUPERFICIAL GASTRITIS WITHOUT BLEEDING: Chronic | ICD-10-CM

## 2019-02-18 DIAGNOSIS — M17.0 PRIMARY OSTEOARTHRITIS OF BOTH KNEES: Chronic | ICD-10-CM

## 2019-02-18 DIAGNOSIS — K21.9 GASTROESOPHAGEAL REFLUX DISEASE WITHOUT ESOPHAGITIS: Chronic | ICD-10-CM

## 2019-02-18 DIAGNOSIS — Z12.39 BREAST CANCER SCREENING: ICD-10-CM

## 2019-02-18 DIAGNOSIS — D53.1 MEGALOBLASTIC ANEMIA DUE TO VITAMIN B12 DEFICIENCY: Chronic | ICD-10-CM

## 2019-02-18 PROCEDURE — 99214 OFFICE O/P EST MOD 30 MIN: CPT | Performed by: INTERNAL MEDICINE

## 2019-02-18 NOTE — PROGRESS NOTES
Subjective        History of Present Illness     Andria Goldstein is a 63 y.o. female who presents for 6-month follow up on hyperlipidemia, hypertension, cerebrovascular disease, apparent Alzheimer's dementia, impaired fasting glucose and vitamin B-12 deficiency among other issues.  She continues on Neurontin for chronic bilateral lower lumbar back pain with left-sided sciatica and lumbar radiculopathy.   She continues on a combination of Aricept and Namenda for Alzheimer's and continues on Effexor XR, which is adequately controlling anxiety.   She has been having increased headaches, for which she takes Tylenol.       She continues to struggle with chronic diarrhea and occasional bowel incontinence, which is reasonably managed by premedicating with Imodium when going out away from home.  GERD/gastritis symptoms adequately controlled with Zantac and Prilosec.       DEXA 09/2016 revealed normal bone density.  She was seen at [facility] after a fall on 08/07/18 sustaining minor injuries to right wrist, knee, and shoulder.  X-rays were negative for fracture.  She has a planned left knee replacement surgery scheduled next week.   She has moderate-severe bilateral knee DJD with meniscal derangement in the left knee demonstrated on MRI.  She has failed conservative treatment and has decided to undergo surgical intervention with a left total knee arthroplasty scheduled to be performed by Dr. Mcduffie on 02/26/19.    Weight down 4 pounds in the past six months.  Blood pressure at goal.      The patient's relevant past medical, surgical, and social history was reviewed in Epic.   Lab results are reviewed with the patient today.  CBC unremarkable.  Fasting glucose 100.  Total cholesterol improved at 159.  HDL improved at 70.  LDL 64.  Triglycerides 125.  Excellent LDL/HDL ratio 0.91.  Renal and liver function normal.  Vitamin B-12 has drifted down without taking her oral B-12.          Review of Systems   Constitutional:  "Negative for chills, fatigue and fever.   HENT: Negative for congestion, ear pain, postnasal drip, sinus pressure and sore throat.    Respiratory: Negative for cough, shortness of breath and wheezing.    Cardiovascular: Negative for chest pain, palpitations and leg swelling.   Gastrointestinal: Negative for abdominal pain, blood in stool, constipation, diarrhea, nausea and vomiting.   Endocrine: Negative for cold intolerance, heat intolerance, polydipsia and polyuria.   Genitourinary: Negative for dysuria, frequency, hematuria and urgency.   Skin: Negative for rash.   Neurological: Negative for syncope and weakness.      Objective     Vitals:    02/18/19 1140   BP: 138/76   Pulse: 60   Temp: 97.9 °F (36.6 °C)   TempSrc: Oral   Weight: 82.5 kg (181 lb 12.8 oz)   Height: 152.4 cm (60\")     Physical Exam   Constitutional: She is oriented to person, place, and time. She appears well-developed and well-nourished. No distress.   Pleasant, obese female with mild evidence of dementia.  Accompanied by her       HENT:   Head: Normocephalic and atraumatic.   Nose: Right sinus exhibits no maxillary sinus tenderness and no frontal sinus tenderness. Left sinus exhibits no maxillary sinus tenderness and no frontal sinus tenderness.   Mouth/Throat: Uvula is midline, oropharynx is clear and moist and mucous membranes are normal. No oral lesions. No tonsillar exudate.   Eyes: Conjunctivae and EOM are normal. Pupils are equal, round, and reactive to light.   Neck: Trachea normal. Neck supple. No JVD present. Carotid bruit is not present. No tracheal deviation present. No thyroid mass and no thyromegaly present.   Cardiovascular: Normal rate, regular rhythm, normal heart sounds and intact distal pulses.  No extrasystoles are present. PMI is not displaced.   No murmur heard.  Pulmonary/Chest: Effort normal and breath sounds normal. No accessory muscle usage. No respiratory distress. She has no decreased breath sounds. She has no " wheezes. She has no rhonchi. She has no rales.   Abdominal: Soft. Bowel sounds are normal. She exhibits no distension. There is no hepatosplenomegaly. There is no tenderness.   Obese abdomen      Vascular Status -  Her right foot exhibits normal foot vasculature  and no edema. Her left foot exhibits normal foot vasculature  and no edema.  Lymphadenopathy:     She has no cervical adenopathy.   Neurological: She is alert and oriented to person, place, and time. No cranial nerve deficit. Coordination normal.   Skin: Skin is warm, dry and intact. No rash noted. No cyanosis. Nails show no clubbing.   Psychiatric: She has a normal mood and affect. Her speech is normal and behavior is normal. Judgment and thought content normal.   Vitals reviewed.          Assessment/Plan      Continue the Aricept and Namenda.  Continue the Effexor XR for anxiety and depression.     Continue Lipitor and intensify diet and weight loss efforts.  Hopefully, she can increase walking for exercise after she has her knee replacements.    Continue the current blood pressure medications.  Pursue sodium restriction and weight loss.    For the chronic diarrhea, I recommended she continue to premedicate with Imodium, especially when going out away from home.   Continue the omeprazole and Zantac to manage GERD.       Resume OTC vitamin B-12 500 mcg one tablet daily.      She is scheduled for left total knee arthroplasty by Dr. Mcduffie on 02/26/19.  They anticipate right TKR 3-4 months later.    An order is sent for patient to schedule overdue mammogram.     Recommended diet, exercise, and weight loss efforts to help delay progression of impaired fasting glucose.     Intensify diet, exercise, and weight loss efforts to delay progression of impaired fasting glucose.  Written literature regarding diet and exercise included in patient's AVS.      Continue other medications and vitamin and mineral supplements to treat additional medical problems which we  addressed today.      Return in six months for follow up with fasting labs one week prior.       Scribed for Dr. Mckeon by Cesilia Gutierrez [facility].     Diagnoses and all orders for this visit:    Essential hypertension  -     CBC Auto Differential; Future  -     Comprehensive Metabolic Panel; Future    Mixed hyperlipidemia  -     LDL Cholesterol, Direct; Future  -     TSH; Future    Cerebrovascular disease    Gastroesophageal reflux disease without esophagitis    Chronic superficial gastritis without bleeding    Class 2 severe obesity due to excess calories with serious comorbidity and body mass index (BMI) of 35.0 to 35.9 in adult (CMS/[facility])  -     TSH; Future    Postcholecystectomy diarrhea    Impaired fasting glucose  -     Hemoglobin A1c; Future    Early onset Alzheimer's dementia without behavioral disturbance    Primary osteoarthritis of both knees    Generalized anxiety disorder    Breast cancer screening  -     Mammo Screening Digital Tomosynthesis Bilateral With CAD    Megaloblastic anemia due to vitamin B12 deficiency  -     Vitamin B12; Future    Recurrent major depressive disorder, in partial remission (CMS/[facility])        Lab on 02/13/2019   Component Date Value Ref Range Status   • WBC 02/13/2019 6.94  3.20 - 9.80 10*3/mm3 Final   • RBC 02/13/2019 4.73  3.77 - 5.16 10*6/mm3 Final   • Hemoglobin 02/13/2019 13.4  12.0 - 15.5 g/dL Final   • Hematocrit 02/13/2019 42.7  35.0 - 45.0 % Final   • MCV 02/13/2019 90.3  80.0 - 98.0 fL Final   • MCH 02/13/2019 28.3  26.5 - 34.0 pg Final   • MCHC 02/13/2019 31.4  31.4 - 36.0 g/dL Final   • RDW 02/13/2019 14.0  11.5 - 14.5 % Final   • RDW-SD 02/13/2019 45.5  36.4 - 46.3 fl Final   • MPV 02/13/2019 11.5  8.0 - 12.0 fL Final   • Platelets 02/13/2019 197  150 - 450 10*3/mm3 Final   • Neutrophil % 02/13/2019 67.5  37.0 - 80.0 % Final   • Lymphocyte % 02/13/2019 23.1  10.0 - 50.0 % Final   • Monocyte % 02/13/2019 6.8  0.0 - 12.0 % Final   • Eosinophil % 02/13/2019 2.3  0.0 -  7.0 % Final   • Basophil % 02/13/2019 0.3  0.0 - 2.0 % Final   • Neutrophils, Absolute 02/13/2019 4.69  2.00 - 8.60 10*3/mm3 Final   • Lymphocytes, Absolute 02/13/2019 1.60  0.60 - 4.20 10*3/mm3 Final   • Monocytes, Absolute 02/13/2019 0.47  0.00 - 0.90 10*3/mm3 Final   • Eosinophils, Absolute 02/13/2019 0.16  0.00 - 0.70 10*3/mm3 Final   • Basophils, Absolute 02/13/2019 0.02  0.00 - 0.20 10*3/mm3 Final   • Glucose 02/13/2019 100* 74 - 99 mg/dL Final   • BUN 02/13/2019 12  7 - 17 mg/dL Final   • Creatinine 02/13/2019 0.78  0.52 - 1.04 mg/dL Final   • Sodium 02/13/2019 144  137 - 145 mmol/L Final   • Potassium 02/13/2019 4.5  3.4 - 5.0 mmol/L Final   • Chloride 02/13/2019 105  98 - 107 mmol/L Final   • CO2 02/13/2019 35.0* 22.0 - 30.0 mmol/L Final   • Calcium 02/13/2019 9.6  8.4 - 10.2 mg/dL Final   • Total Protein 02/13/2019 6.9  6.3 - 8.2 g/dL Final   • Albumin 02/13/2019 4.30  3.50 - 5.00 g/dL Final   • ALT (SGPT) 02/13/2019 17  <=35 U/L Final   • AST (SGOT) 02/13/2019 23  14 - 36 U/L Final   • Alkaline Phosphatase 02/13/2019 86  38 - 126 U/L Final   • Total Bilirubin 02/13/2019 0.7  0.2 - 1.3 mg/dL Final   • eGFR Non African Amer 02/13/2019 74  45 - 104 mL/min/1.73 Final   • Globulin 02/13/2019 2.6  2.3 - 3.5 gm/dL Final   • A/G Ratio 02/13/2019 1.7  1.1 - 1.8 g/dL Final   • BUN/Creatinine Ratio 02/13/2019 15.4  7.0 - 25.0 Final   • Anion Gap 02/13/2019 4.0* 5.0 - 15.0 mmol/L Final   • Total Cholesterol 02/13/2019 159  150 - 200 mg/dL Final   • Triglycerides 02/13/2019 125  <=150 mg/dL Final   • HDL Cholesterol 02/13/2019 70* 40 - 59 mg/dL Final   • LDL Cholesterol  02/13/2019 64  <=100 mg/dL Final   • VLDL Cholesterol 02/13/2019 25  mg/dL Final   • LDL/HDL Ratio 02/13/2019 0.91  0.00 - 3.22 Final   • Vitamin B-12 02/13/2019 423  239 - 931 pg/mL Final   ]

## 2019-02-18 NOTE — PATIENT INSTRUCTIONS
Exercising to Lose Weight  Exercising can help you to lose weight. In order to lose weight through exercise, you need to do vigorous-intensity exercise. You can tell that you are exercising with vigorous intensity if you are breathing very hard and fast and cannot hold a conversation while exercising.  Moderate-intensity exercise helps to maintain your current weight. You can tell that you are exercising at a moderate level if you have a higher heart rate and faster breathing, but you are still able to hold a conversation.  How often should I exercise?  Choose an activity that you enjoy and set realistic goals. Your health care provider can help you to make an activity plan that works for you. Exercise regularly as directed by your health care provider. This may include:  · Doing resistance training twice each week, such as:  ? Push-ups.  ? Sit-ups.  ? Lifting weights.  ? Using resistance bands.  · Doing a given intensity of exercise for a given amount of time. Choose from these options:  ? 150 minutes of moderate-intensity exercise every week.  ? 75 minutes of vigorous-intensity exercise every week.  ? A mix of moderate-intensity and vigorous-intensity exercise every week.    Children, pregnant women, people who are out of shape, people who are overweight, and older adults may need to consult a health care provider for individual recommendations. If you have any sort of medical condition, be sure to consult your health care provider before starting a new exercise program.  What are some activities that can help me to lose weight?  · Walking at a rate of at least 4.5 miles an hour.  · Jogging or running at a rate of 5 miles per hour.  · Biking at a rate of at least 10 miles per hour.  · Lap swimming.  · Roller-skating or in-line skating.  · Cross-country skiing.  · Vigorous competitive sports, such as football, basketball, and soccer.  · Jumping rope.  · Aerobic dancing.  How can I be more active in my day-to-day  activities?  · Use the stairs instead of the elevator.  · Take a walk during your lunch break.  · If you drive, park your car farther away from work or school.  · If you take public transportation, get off one stop early and walk the rest of the way.  · Make all of your phone calls while standing up and walking around.  · Get up, stretch, and walk around every 30 minutes throughout the day.  What guidelines should I follow while exercising?  · Do not exercise so much that you hurt yourself, feel dizzy, or get very short of breath.  · Consult your health care provider prior to starting a new exercise program.  · Wear comfortable clothes and shoes with good support.  · Drink plenty of water while you exercise to prevent dehydration or heat stroke. Body water is lost during exercise and must be replaced.  · Work out until you breathe faster and your heart beats faster.  This information is not intended to replace advice given to you by your health care provider. Make sure you discuss any questions you have with your health care provider.  Document Released: 01/20/2012 Document Revised: 05/25/2017 Document Reviewed: 05/21/2015  [employer] Interactive Patient Education © 2018 [employer]      Calorie Counting for Weight Loss  Calories are units of energy. Your body needs a certain amount of calories from food to keep you going throughout the day. When you eat more calories than your body needs, your body stores the extra calories as fat. When you eat fewer calories than your body needs, your body burns fat to get the energy it needs.  Calorie counting means keeping track of how many calories you eat and drink each day. Calorie counting can be helpful if you need to lose weight. If you make sure to eat fewer calories than your body needs, you should lose weight. Ask your health care provider what a healthy weight is for you.  For calorie counting to work, you will need to eat the right number of calories in a day in order  to lose a healthy amount of weight per week. A dietitian can help you determine how many calories you need in a day and will give you suggestions on how to reach your calorie goal.  · A healthy amount of weight to lose per week is usually 1-2 lb (0.5-0.9 kg). This usually means that your daily calorie intake should be reduced by 500-750 calories.  · Eating 1,200 - 1,500 calories per day can help most women lose weight.  · Eating 1,500 - 1,800 calories per day can help most men lose weight.    What do I need to know about calorie counting?  In order to meet your daily calorie goal, you will need to:  · Find out how many calories are in each food you would like to eat. Try to do this before you eat.  · Decide how much of the food you plan to eat.  · Write down what you ate and how many calories it had. Doing this is called keeping a food log.    To successfully lose weight, it is important to balance calorie counting with a healthy lifestyle that includes regular activity. Aim for 150 minutes of moderate exercise (such as walking) or 75 minutes of vigorous exercise (such as running) each week.  Where do I find calorie information?    The number of calories in a food can be found on a Nutrition Facts label. If a food does not have a Nutrition Facts label, try to look up the calories online or ask your dietitian for help.  Remember that calories are listed per serving. If you choose to have more than one serving of a food, you will have to multiply the calories per serving by the amount of servings you plan to eat. For example, the label on a package of bread might say that a serving size is 1 slice and that there are 90 calories in a serving. If you eat 1 slice, you will have eaten 90 calories. If you eat 2 slices, you will have eaten 180 calories.  How do I keep a food log?  Immediately after each meal, record the following information in your food log:  · What you ate. Don't forget to include toppings, sauces, and  "other extras on the food.  · How much you ate. This can be measured in cups, ounces, or number of items.  · How many calories each food and drink had.  · The total number of calories in the meal.    Keep your food log near you, such as in a small notebook in your pocket, or use a mobile [employer] or website. Some programs will calculate calories for you and show you how many calories you have left for the day to meet your goal.  What are some calorie counting tips?  · Use your calories on foods and drinks that will fill you up and not leave you hungry:  ? Some examples of foods that fill you up are nuts and nut butters, vegetables, lean proteins, and high-fiber foods like whole grains. High-fiber foods are foods with more than 5 g fiber per serving.  ? Drinks such as sodas, specialty coffee drinks, alcohol, and juices have a lot of calories, yet do not fill you up.  · Eat nutritious foods and avoid empty calories. Empty calories are calories you get from foods or beverages that do not have many vitamins or protein, such as candy, sweets, and soda. It is better to have a nutritious high-calorie food (such as an avocado) than a food with few nutrients (such as a bag of chips).  · Know how many calories are in the foods you eat most often. This will help you calculate calorie counts faster.  · Pay attention to calories in drinks. Low-calorie drinks include water and unsweetened drinks.  · Pay attention to nutrition labels for \"low fat\" or \"fat free\" foods. These foods sometimes have the same amount of calories or more calories than the full fat versions. They also often have added sugar, starch, or salt, to make up for flavor that was removed with the fat.  · Find a way of tracking calories that works for you. Get creative. Try different apps or programs if writing down calories does not work for you.  What are some portion control tips?  · Know how many calories are in a serving. This will help you know how many servings of " a certain food you can have.  · Use a measuring cup to measure serving sizes. You could also try weighing out portions on a kitchen scale. With time, you will be able to estimate serving sizes for some foods.  · Take some time to put servings of different foods on your favorite plates, bowls, and cups so you know what a serving looks like.  · Try not to eat straight from a bag or box. Doing this can lead to overeating. Put the amount you would like to eat in a cup or on a plate to make sure you are eating the right portion.  · Use smaller plates, glasses, and bowls to prevent overeating.  · Try not to multitask (for example, watch TV or use your computer) while eating. If it is time to eat, sit down at a table and enjoy your food. This will help you to know when you are full. It will also help you to be aware of what you are eating and how much you are eating.  What are tips for following this plan?  Reading food labels  · Check the calorie count compared to the serving size. The serving size may be smaller than what you are used to eating.  · Check the source of the calories. Make sure the food you are eating is high in vitamins and protein and low in saturated and trans fats.  Shopping  · Read nutrition labels while you shop. This will help you make healthy decisions before you decide to purchase your food.  · Make a grocery list and stick to it.  Cooking  · Try to cook your favorite foods in a healthier way. For example, try baking instead of frying.  · Use low-fat dairy products.  Meal planning  · Use more fruits and vegetables. Half of your plate should be fruits and vegetables.  · Include lean proteins like poultry and fish.  How do I count calories when eating out?  · Ask for smaller portion sizes.  · Consider sharing an entree and sides instead of getting your own entree.  · If you get your own entree, eat only half. Ask for a box at the beginning of your meal and put the rest of your entree in it so you are  not tempted to eat it.  · If calories are listed on the menu, choose the lower calorie options.  · Choose dishes that include vegetables, fruits, whole grains, low-fat dairy products, and lean protein.  · Choose items that are boiled, broiled, grilled, or steamed. Stay away from items that are buttered, battered, fried, or served with cream sauce. Items labeled “crispy” are usually fried, unless stated otherwise.  · Choose water, low-fat milk, unsweetened iced tea, or other drinks without added sugar. If you want an alcoholic beverage, choose a lower calorie option such as a glass of wine or light beer.  · Ask for dressings, sauces, and syrups on the side. These are usually high in calories, so you should limit the amount you eat.  · If you want a salad, choose a garden salad and ask for grilled meats. Avoid extra toppings like saleem, cheese, or fried items. Ask for the dressing on the side, or ask for olive oil and vinegar or lemon to use as dressing.  · Estimate how many servings of a food you are given. For example, a serving of cooked rice is ½ cup or about the size of half a baseball. Knowing serving sizes will help you be aware of how much food you are eating at restaurants. The list below tells you how big or small some common portion sizes are based on everyday objects:  ? 1 oz--4 stacked dice.  ? 3 oz--1 deck of cards.  ? 1 tsp--1 die.  ? 1 Tbsp--½ a ping-pong ball.  ? 2 Tbsp--1 ping-pong ball.  ? ½ cup--½ baseball.  ? 1 cup--1 baseball.  Summary  · Calorie counting means keeping track of how many calories you eat and drink each day. If you eat fewer calories than your body needs, you should lose weight.  · A healthy amount of weight to lose per week is usually 1-2 lb (0.5-0.9 kg). This usually means reducing your daily calorie intake by 500-750 calories.  · The number of calories in a food can be found on a Nutrition Facts label. If a food does not have a Nutrition Facts label, try to look up the calories  online or ask your dietitian for help.  · Use your calories on foods and drinks that will fill you up, and not on foods and drinks that will leave you hungry.  · Use smaller plates, glasses, and bowls to prevent overeating.  This information is not intended to replace advice given to you by your health care provider. Make sure you discuss any questions you have with your health care provider.  Document Released: 12/18/2006 Document Revised: 11/17/2017 Document Reviewed: 11/17/2017  [employer] Interactive Patient Education © 2018 [employer]

## 2019-02-22 RX ORDER — CARVEDILOL 6.25 MG/1
6.25 TABLET ORAL 2 TIMES DAILY WITH MEALS
Qty: 60 TABLET | Refills: 11 | Status: SHIPPED | OUTPATIENT
Start: 2019-02-22 | End: 2020-02-12

## 2019-03-05 RX ORDER — ATORVASTATIN CALCIUM 10 MG/1
10 TABLET, FILM COATED ORAL DAILY
Qty: 90 TABLET | Refills: 1 | Status: SHIPPED | OUTPATIENT
Start: 2019-03-05 | End: 2019-06-18 | Stop reason: SDUPTHER

## 2019-03-20 RX ORDER — LISINOPRIL AND HYDROCHLOROTHIAZIDE 20; 12.5 MG/1; MG/1
1 TABLET ORAL EVERY MORNING
Qty: 90 TABLET | Refills: 3 | Status: SHIPPED | OUTPATIENT
Start: 2019-03-20 | End: 2019-05-01 | Stop reason: SDUPTHER

## 2019-04-22 RX ORDER — LISINOPRIL AND HYDROCHLOROTHIAZIDE 20; 12.5 MG/1; MG/1
1 TABLET ORAL EVERY MORNING
Qty: 30 TABLET | Refills: 12 | OUTPATIENT
Start: 2019-04-22

## 2019-04-30 RX ORDER — LISINOPRIL AND HYDROCHLOROTHIAZIDE 20; 12.5 MG/1; MG/1
1 TABLET ORAL EVERY MORNING
Qty: 30 TABLET | Refills: 12 | OUTPATIENT
Start: 2019-04-30

## 2019-05-01 RX ORDER — OMEPRAZOLE 40 MG/1
40 CAPSULE, DELAYED RELEASE ORAL EVERY MORNING
Qty: 30 CAPSULE | Refills: 11 | Status: SHIPPED | OUTPATIENT
Start: 2019-05-01 | End: 2020-05-05

## 2019-05-01 RX ORDER — LISINOPRIL AND HYDROCHLOROTHIAZIDE 20; 12.5 MG/1; MG/1
1 TABLET ORAL EVERY MORNING
Qty: 30 TABLET | Refills: 11 | Status: SHIPPED | OUTPATIENT
Start: 2019-05-01 | End: 2019-07-24 | Stop reason: SDUPTHER

## 2019-06-18 RX ORDER — VENLAFAXINE HYDROCHLORIDE 150 MG/1
150 CAPSULE, EXTENDED RELEASE ORAL DAILY
Qty: 90 CAPSULE | Refills: 3 | Status: SHIPPED | OUTPATIENT
Start: 2019-06-18 | End: 2020-04-08 | Stop reason: SDUPTHER

## 2019-06-18 RX ORDER — ATORVASTATIN CALCIUM 10 MG/1
10 TABLET, FILM COATED ORAL DAILY
Qty: 90 TABLET | Refills: 3 | Status: SHIPPED | OUTPATIENT
Start: 2019-06-18 | End: 2019-08-20 | Stop reason: SDUPTHER

## 2019-06-19 RX ORDER — GABAPENTIN 800 MG/1
TABLET ORAL
Qty: 60 TABLET | Refills: 2 | Status: SHIPPED | OUTPATIENT
Start: 2019-06-19 | End: 2019-08-06 | Stop reason: SDUPTHER

## 2019-06-20 RX ORDER — GABAPENTIN 800 MG/1
TABLET ORAL
Qty: 60 TABLET | Refills: 5 | OUTPATIENT
Start: 2019-06-20

## 2019-07-24 RX ORDER — LISINOPRIL AND HYDROCHLOROTHIAZIDE 20; 12.5 MG/1; MG/1
1 TABLET ORAL EVERY MORNING
Qty: 90 TABLET | Refills: 3 | Status: SHIPPED | OUTPATIENT
Start: 2019-07-24 | End: 2019-10-23 | Stop reason: SDUPTHER

## 2019-08-06 RX ORDER — GABAPENTIN 800 MG/1
TABLET ORAL
Qty: 30 TABLET | Refills: 0 | Status: SHIPPED | OUTPATIENT
Start: 2019-08-06 | End: 2019-08-20 | Stop reason: SDUPTHER

## 2019-08-14 ENCOUNTER — LAB (OUTPATIENT)
Dept: LAB | Facility: OTHER | Age: 64
End: 2019-08-14

## 2019-08-14 DIAGNOSIS — E78.2 MIXED HYPERLIPIDEMIA: Chronic | ICD-10-CM

## 2019-08-14 DIAGNOSIS — E66.01 CLASS 2 SEVERE OBESITY DUE TO EXCESS CALORIES WITH SERIOUS COMORBIDITY AND BODY MASS INDEX (BMI) OF 35.0 TO 35.9 IN ADULT (HCC): Chronic | ICD-10-CM

## 2019-08-14 DIAGNOSIS — I10 ESSENTIAL HYPERTENSION: Chronic | ICD-10-CM

## 2019-08-14 DIAGNOSIS — D53.1 MEGALOBLASTIC ANEMIA DUE TO VITAMIN B12 DEFICIENCY: Chronic | ICD-10-CM

## 2019-08-14 DIAGNOSIS — R73.01 IMPAIRED FASTING GLUCOSE: Chronic | ICD-10-CM

## 2019-08-14 LAB
ALBUMIN SERPL-MCNC: 4 G/DL (ref 3.5–5)
ALBUMIN/GLOB SERPL: 1.4 G/DL (ref 1.1–1.8)
ALP SERPL-CCNC: 97 U/L (ref 38–126)
ALT SERPL W P-5'-P-CCNC: 16 U/L
ANION GAP SERPL CALCULATED.3IONS-SCNC: 9 MMOL/L (ref 5–15)
AST SERPL-CCNC: 24 U/L (ref 14–36)
BASOPHILS # BLD AUTO: 0.01 10*3/MM3 (ref 0–0.2)
BASOPHILS NFR BLD AUTO: 0.2 % (ref 0–1.5)
BILIRUB SERPL-MCNC: 0.8 MG/DL (ref 0.2–1.3)
BUN BLD-MCNC: 14 MG/DL (ref 7–23)
BUN/CREAT SERPL: 19.2 (ref 7–25)
CALCIUM SPEC-SCNC: 9.7 MG/DL (ref 8.4–10.2)
CHLORIDE SERPL-SCNC: 106 MMOL/L (ref 101–112)
CO2 SERPL-SCNC: 32 MMOL/L (ref 22–30)
CREAT BLD-MCNC: 0.73 MG/DL (ref 0.52–1.04)
DEPRECATED RDW RBC AUTO: 47.1 FL (ref 37–54)
EOSINOPHIL # BLD AUTO: 0.26 10*3/MM3 (ref 0–0.4)
EOSINOPHIL NFR BLD AUTO: 4 % (ref 0.3–6.2)
ERYTHROCYTE [DISTWIDTH] IN BLOOD BY AUTOMATED COUNT: 14.7 % (ref 12.3–15.4)
GFR SERPL CREATININE-BSD FRML MDRD: 80 ML/MIN/1.73 (ref 45–104)
GLOBULIN UR ELPH-MCNC: 2.8 GM/DL (ref 2.3–3.5)
GLUCOSE BLD-MCNC: 104 MG/DL (ref 70–99)
HBA1C MFR BLD: 5.63 % (ref 4.8–5.6)
HCT VFR BLD AUTO: 39.1 % (ref 34–46.6)
HGB BLD-MCNC: 12.8 G/DL (ref 12–15.9)
LYMPHOCYTES # BLD AUTO: 1.14 10*3/MM3 (ref 0.7–3.1)
LYMPHOCYTES NFR BLD AUTO: 17.5 % (ref 19.6–45.3)
MCH RBC QN AUTO: 29 PG (ref 26.6–33)
MCHC RBC AUTO-ENTMCNC: 32.7 G/DL (ref 31.5–35.7)
MCV RBC AUTO: 88.5 FL (ref 79–97)
MONOCYTES # BLD AUTO: 0.45 10*3/MM3 (ref 0.1–0.9)
MONOCYTES NFR BLD AUTO: 6.9 % (ref 5–12)
NEUTROPHILS # BLD AUTO: 4.64 10*3/MM3 (ref 1.7–7)
NEUTROPHILS NFR BLD AUTO: 71.4 % (ref 42.7–76)
PLATELET # BLD AUTO: 192 10*3/MM3 (ref 140–450)
PMV BLD AUTO: 11.8 FL (ref 6–12)
POTASSIUM BLD-SCNC: 4.2 MMOL/L (ref 3.4–5)
PROT SERPL-MCNC: 6.8 G/DL (ref 6.3–8.6)
RBC # BLD AUTO: 4.42 10*6/MM3 (ref 3.77–5.28)
SODIUM BLD-SCNC: 147 MMOL/L (ref 137–145)
WBC NRBC COR # BLD: 6.5 10*3/MM3 (ref 3.4–10.8)

## 2019-08-14 PROCEDURE — 83036 HEMOGLOBIN GLYCOSYLATED A1C: CPT | Performed by: INTERNAL MEDICINE

## 2019-08-14 PROCEDURE — 83721 ASSAY OF BLOOD LIPOPROTEIN: CPT | Performed by: INTERNAL MEDICINE

## 2019-08-14 PROCEDURE — 80053 COMPREHEN METABOLIC PANEL: CPT | Performed by: INTERNAL MEDICINE

## 2019-08-14 PROCEDURE — 36415 COLL VENOUS BLD VENIPUNCTURE: CPT | Performed by: INTERNAL MEDICINE

## 2019-08-14 PROCEDURE — 84443 ASSAY THYROID STIM HORMONE: CPT | Performed by: INTERNAL MEDICINE

## 2019-08-14 PROCEDURE — 85025 COMPLETE CBC W/AUTO DIFF WBC: CPT | Performed by: INTERNAL MEDICINE

## 2019-08-14 PROCEDURE — 82607 VITAMIN B-12: CPT | Performed by: INTERNAL MEDICINE

## 2019-08-15 LAB
ARTICHOKE IGE QN: 114 MG/DL (ref 0–100)
TSH SERPL DL<=0.05 MIU/L-ACNC: 1.24 MIU/ML (ref 0.27–4.2)
VIT B12 BLD-MCNC: 511 PG/ML (ref 211–946)

## 2019-08-20 ENCOUNTER — OFFICE VISIT (OUTPATIENT)
Dept: FAMILY MEDICINE CLINIC | Facility: CLINIC | Age: 64
End: 2019-08-20

## 2019-08-20 VITALS
BODY MASS INDEX: 36.08 KG/M2 | HEART RATE: 60 BPM | DIASTOLIC BLOOD PRESSURE: 80 MMHG | SYSTOLIC BLOOD PRESSURE: 124 MMHG | TEMPERATURE: 98.2 F | WEIGHT: 183.8 LBS | HEIGHT: 60 IN

## 2019-08-20 DIAGNOSIS — F33.41 RECURRENT MAJOR DEPRESSIVE DISORDER, IN PARTIAL REMISSION (HCC): Chronic | ICD-10-CM

## 2019-08-20 DIAGNOSIS — E78.2 MIXED HYPERLIPIDEMIA: Chronic | ICD-10-CM

## 2019-08-20 DIAGNOSIS — D53.1 MEGALOBLASTIC ANEMIA DUE TO VITAMIN B12 DEFICIENCY: Chronic | ICD-10-CM

## 2019-08-20 DIAGNOSIS — R73.01 IMPAIRED FASTING GLUCOSE: Chronic | ICD-10-CM

## 2019-08-20 DIAGNOSIS — E66.01 CLASS 2 SEVERE OBESITY DUE TO EXCESS CALORIES WITH SERIOUS COMORBIDITY AND BODY MASS INDEX (BMI) OF 35.0 TO 35.9 IN ADULT (HCC): Chronic | ICD-10-CM

## 2019-08-20 DIAGNOSIS — K58.0 IRRITABLE BOWEL SYNDROME WITH DIARRHEA: ICD-10-CM

## 2019-08-20 DIAGNOSIS — J45.20 MILD INTERMITTENT ASTHMA WITHOUT COMPLICATION: Chronic | ICD-10-CM

## 2019-08-20 DIAGNOSIS — K21.9 GASTROESOPHAGEAL REFLUX DISEASE WITHOUT ESOPHAGITIS: Chronic | ICD-10-CM

## 2019-08-20 DIAGNOSIS — G30.0 EARLY ONSET ALZHEIMER'S DEMENTIA WITHOUT BEHAVIORAL DISTURBANCE (HCC): Chronic | ICD-10-CM

## 2019-08-20 DIAGNOSIS — M50.30 DEGENERATION OF CERVICAL INTERVERTEBRAL DISC: ICD-10-CM

## 2019-08-20 DIAGNOSIS — F41.1 GENERALIZED ANXIETY DISORDER: Chronic | ICD-10-CM

## 2019-08-20 DIAGNOSIS — F02.80 EARLY ONSET ALZHEIMER'S DEMENTIA WITHOUT BEHAVIORAL DISTURBANCE (HCC): Chronic | ICD-10-CM

## 2019-08-20 DIAGNOSIS — K29.30 CHRONIC SUPERFICIAL GASTRITIS WITHOUT BLEEDING: Chronic | ICD-10-CM

## 2019-08-20 DIAGNOSIS — I67.9 CEREBROVASCULAR DISEASE: Chronic | ICD-10-CM

## 2019-08-20 DIAGNOSIS — I10 ESSENTIAL HYPERTENSION: Primary | Chronic | ICD-10-CM

## 2019-08-20 DIAGNOSIS — M51.34 DEGENERATION OF THORACIC INTERVERTEBRAL DISC: Chronic | ICD-10-CM

## 2019-08-20 PROCEDURE — 99214 OFFICE O/P EST MOD 30 MIN: CPT | Performed by: INTERNAL MEDICINE

## 2019-08-20 PROCEDURE — 90471 IMMUNIZATION ADMIN: CPT | Performed by: INTERNAL MEDICINE

## 2019-08-20 PROCEDURE — 90670 PCV13 VACCINE IM: CPT | Performed by: INTERNAL MEDICINE

## 2019-08-20 RX ORDER — TRIAMTERENE AND HYDROCHLOROTHIAZIDE 75; 50 MG/1; MG/1
0.5 TABLET ORAL DAILY PRN
Qty: 30 TABLET | Refills: 0 | Status: SHIPPED | OUTPATIENT
Start: 2019-08-20 | End: 2019-09-25 | Stop reason: SDUPTHER

## 2019-08-20 RX ORDER — GABAPENTIN 800 MG/1
TABLET ORAL
Qty: 60 TABLET | Refills: 5 | Status: SHIPPED | OUTPATIENT
Start: 2019-08-20 | End: 2020-03-13 | Stop reason: SDUPTHER

## 2019-08-20 RX ORDER — ATORVASTATIN CALCIUM 10 MG/1
10 TABLET, FILM COATED ORAL DAILY
Qty: 90 TABLET | Refills: 3 | Status: SHIPPED | OUTPATIENT
Start: 2019-08-20 | End: 2019-09-17 | Stop reason: SDUPTHER

## 2019-08-20 RX ORDER — ATORVASTATIN CALCIUM 10 MG/1
10 TABLET, FILM COATED ORAL DAILY
Qty: 90 TABLET | Refills: 3 | Status: SHIPPED | OUTPATIENT
Start: 2019-08-20 | End: 2019-08-20 | Stop reason: SDUPTHER

## 2019-08-20 NOTE — PROGRESS NOTES
Subjective        History of Present Illness     Andria Goldstein is a 64 y.o. female who presents for 6-month follow up on hyperlipidemia, hypertension, cerebrovascular disease, apparent Alzheimer's dementia, impaired fasting glucose and vitamin B-12 deficiency among other issues.  She reports good control of asthma symptoms.  GERD symptoms adequately managed with Prilosec and Zantac.  She continues on a combination of Namenda and Aricept for dementia, but both patient and her  reports they feel symptoms are not progressing and even note improvement in memory.  HALI symptoms adequately managed with Effexor XR.   She struggles with chronic back pain due to degenerative disc disease.    She reports a 2-3 day flare of her lower extremity edema due to chronic venous insufficiency.  She did eat some ham high in sodium a few mornings ago.  We reviewed management of chronic venous insufficiency including sodium restriction, weight loss, use of compression stockings, and elevating lower extremities when not ambulating.       She continues on Neurontin for chronic low back pain with bilateral sciatica.  She denies significant side effects including excessive daytime sedation causing impairment in operating a motor vehicle.       She reports the postprandial diarrhea symptoms have improved.       Labs continue to reveal impaired fasting glucose.  She continues to use sugar to andrea tea.  We discussed how she needs to eliminate all sugar sweetened drinks and follow a diabetic diet to help delay progression of the impaired fasting glucose.      Skx months ago, I had her resume OTC vitamin B-12 500 mcg one tablet daily.      DEXA 09/2016 revealed normal bone density.  She had left total knee arthroplasty scheduled to be performed by Dr. Mcduffie on 02/26/19.    Weight is up 2 pounds in the past six months.  Blood pressure at goal.      The patient's relevant past medical, surgical, and social history was reviewed in  "Epic.   Lab results are reviewed with the patient today.  Fasting glucose 104. Normal renal and liver function.  , although, patient reports she has been off of her Lipitor for the past 2-3 weeks due to a problem at the pharmacy.  She states they said the prescription had already been picked up.      Review of Systems   Constitutional: Negative for chills, fatigue and fever.   HENT: Negative for congestion, ear pain, postnasal drip, sinus pressure and sore throat.    Respiratory: Negative for cough, shortness of breath and wheezing.    Cardiovascular: Negative for chest pain, palpitations and leg swelling.   Gastrointestinal: Negative for abdominal pain, blood in stool, constipation, diarrhea, nausea and vomiting.   Endocrine: Negative for cold intolerance, heat intolerance, polydipsia and polyuria.   Genitourinary: Negative for dysuria, frequency, hematuria and urgency.   Skin: Negative for rash.   Neurological: Negative for syncope and weakness.      Objective     Vitals:    08/20/19 1121   BP: 124/80   Pulse: 60   Temp: 98.2 °F (36.8 °C)   TempSrc: Oral   Weight: 83.4 kg (183 lb 12.8 oz)   Height: 152.4 cm (60\")     Physical Exam   Constitutional: She is oriented to person, place, and time. She appears well-developed and well-nourished. No distress.   Obese female.  Accompanied by her .    HENT:   Head: Normocephalic and atraumatic.   Nose: Right sinus exhibits no maxillary sinus tenderness and no frontal sinus tenderness. Left sinus exhibits no maxillary sinus tenderness and no frontal sinus tenderness.   Mouth/Throat: Uvula is midline, oropharynx is clear and moist and mucous membranes are normal. No oral lesions. No tonsillar exudate.   Eyes: Conjunctivae and EOM are normal. Pupils are equal, round, and reactive to light.   Neck: Trachea normal. Neck supple. No JVD present. Carotid bruit is not present. No tracheal deviation present. No thyroid mass and no thyromegaly present.   Cardiovascular: " Normal rate, regular rhythm, normal heart sounds and intact distal pulses.  No extrasystoles are present. PMI is not displaced.   No murmur heard.  Pulmonary/Chest: Effort normal and breath sounds normal. No accessory muscle usage. No respiratory distress. She has no decreased breath sounds. She has no wheezes. She has no rhonchi. She has no rales.   A few chronic lung sounds.    Abdominal: Soft. Bowel sounds are normal. She exhibits no distension. There is no hepatosplenomegaly. There is no tenderness.   Obese abdomen limits exam.      Vascular Status -  Her right foot exhibits normal foot vasculature  and no edema. Her left foot exhibits normal foot vasculature  and no edema.  Lymphadenopathy:     She has no cervical adenopathy.   Neurological: She is alert and oriented to person, place, and time. No cranial nerve deficit. Coordination normal.   Skin: Skin is warm, dry and intact. No rash noted. No cyanosis. Nails show no clubbing.   Psychiatric: She has a normal mood and affect. Her speech is normal and behavior is normal. Judgment and thought content normal.   Vitals reviewed.          Assessment/Plan      Refill is sent for Neurontin 800 mg to take 1/2 tablet in a.m., 1/2 tablet midday, and 1 tablet daily at bedtime.  Patient understands the risks associated with this controlled medication, including tolerance and addiction.  She also agrees to only obtain this medication from me, and not from a another provider, unless that provider is covering for me in my absence.  She also agrees to be compliant in dosing, and not self adjust the dose of medication.  A signed controlled substance agreement is on file, and she has received a controlled substance education sheet at this a previous visit.  She has also signed a consent for treatment with a controlled substance as per Psychiatric policy. KARO was obtained    A prescription is sent for Mazide to take 1/2 tablet p.r.n. Lower extremity edema.  We reviewed  management of chronic venous insufficiency including sodium restriction, weight loss, use of compression stockings, and elevating lower extremities when not ambulating.   She may also need to hold off on Mobic if she is using this frequently.      Continue the Aricept and Namenda for Alzheimer's dementia.  Continue the Effexor XR for anxiety and depression.     I recommended she schedule with Marizol Jackson for overdue GYN exam.     A refill is sent for Lipitor for patient to resume the Lipitor.       After informed verbal consent, patient is given Prevnar without difficulty or complications.  We will plan on giving Pneumovax in one year.        Continue the current blood pressure medications.  Pursue sodium restriction and weight loss.    Return in six months for follow up with fasting labs one week prior.       Scribed for Dr. Mckeon by Cesilia Gutierrez [facility].     Diagnoses and all orders for this visit:    Essential hypertension  -     CBC Auto Differential; Future  -     Comprehensive Metabolic Panel; Future    Cerebrovascular disease    Mixed hyperlipidemia  -     Lipid Panel; Future    Mild intermittent asthma without complication    Gastroesophageal reflux disease without esophagitis    Chronic superficial gastritis without bleeding    Irritable bowel syndrome with diarrhea    Class 2 severe obesity due to excess calories with serious comorbidity and body mass index (BMI) of 35.0 to 35.9 in adult (CMS/HCC)    Early onset Alzheimer's dementia without behavioral disturbance    Generalized anxiety disorder    Recurrent major depressive disorder, in partial remission (CMS/HCC)    Degeneration of cervical intervertebral disc    Degeneration of thoracic intervertebral disc    Impaired fasting glucose  -     Hemoglobin A1c; Future    Megaloblastic anemia due to vitamin B12 deficiency  -     Vitamin B12; Future    Other orders  -     gabapentin (NEURONTIN) 800 MG tablet; 1/2 in the morning and 1/2  at noon  and 1 every night at bedtime  -     Discontinue: atorvastatin (LIPITOR) 10 MG tablet; Take 1 tablet by mouth Daily.  -     triamterene-hydrochlorothiazide (MAXZIDE) 75-50 MG per tablet; Take 0.5 tablets by mouth Daily As Needed (swelling).  -     Pneumococcal Conjugate Vaccine 13-Valent All  -     atorvastatin (LIPITOR) 10 MG tablet; Take 1 tablet by mouth Daily.        Lab on 08/14/2019   Component Date Value Ref Range Status   • WBC 08/14/2019 6.50  3.40 - 10.80 10*3/mm3 Final   • RBC 08/14/2019 4.42  3.77 - 5.28 10*6/mm3 Final   • Hemoglobin 08/14/2019 12.8  12.0 - 15.9 g/dL Final   • Hematocrit 08/14/2019 39.1  34.0 - 46.6 % Final   • MCV 08/14/2019 88.5  79.0 - 97.0 fL Final   • MCH 08/14/2019 29.0  26.6 - 33.0 pg Final   • MCHC 08/14/2019 32.7  31.5 - 35.7 g/dL Final   • RDW 08/14/2019 14.7  12.3 - 15.4 % Final   • RDW-SD 08/14/2019 47.1  37.0 - 54.0 fl Final   • MPV 08/14/2019 11.8  6.0 - 12.0 fL Final   • Platelets 08/14/2019 192  140 - 450 10*3/mm3 Final   • Neutrophil % 08/14/2019 71.4  42.7 - 76.0 % Final   • Lymphocyte % 08/14/2019 17.5* 19.6 - 45.3 % Final   • Monocyte % 08/14/2019 6.9  5.0 - 12.0 % Final   • Eosinophil % 08/14/2019 4.0  0.3 - 6.2 % Final   • Basophil % 08/14/2019 0.2  0.0 - 1.5 % Final   • Neutrophils, Absolute 08/14/2019 4.64  1.70 - 7.00 10*3/mm3 Final   • Lymphocytes, Absolute 08/14/2019 1.14  0.70 - 3.10 10*3/mm3 Final   • Monocytes, Absolute 08/14/2019 0.45  0.10 - 0.90 10*3/mm3 Final   • Eosinophils, Absolute 08/14/2019 0.26  0.00 - 0.40 10*3/mm3 Final   • Basophils, Absolute 08/14/2019 0.01  0.00 - 0.20 10*3/mm3 Final   • Glucose 08/14/2019 104* 70 - 99 mg/dL Final   • BUN 08/14/2019 14  7 - 23 mg/dL Final   • Creatinine 08/14/2019 0.73  0.52 - 1.04 mg/dL Final   • Sodium 08/14/2019 147* 137 - 145 mmol/L Final   • Potassium 08/14/2019 4.2  3.4 - 5.0 mmol/L Final   • Chloride 08/14/2019 106  101 - 112 mmol/L Final   • CO2 08/14/2019 32.0* 22.0 - 30.0 mmol/L Final   • Calcium  08/14/2019 9.7  8.4 - 10.2 mg/dL Final   • Total Protein 08/14/2019 6.8  6.3 - 8.6 g/dL Final   • Albumin 08/14/2019 4.00  3.50 - 5.00 g/dL Final   • ALT (SGPT) 08/14/2019 16  <=35 U/L Final   • AST (SGOT) 08/14/2019 24  14 - 36 U/L Final   • Alkaline Phosphatase 08/14/2019 97  38 - 126 U/L Final   • Total Bilirubin 08/14/2019 0.8  0.2 - 1.3 mg/dL Final   • eGFR Non African Amer 08/14/2019 80  45 - 104 mL/min/1.73 Final   • Globulin 08/14/2019 2.8  2.3 - 3.5 gm/dL Final   • A/G Ratio 08/14/2019 1.4  1.1 - 1.8 g/dL Final   • BUN/Creatinine Ratio 08/14/2019 19.2  7.0 - 25.0 Final   • Anion Gap 08/14/2019 9.0  5.0 - 15.0 mmol/L Final   • Hemoglobin A1C 08/14/2019 5.63* 4.80 - 5.60 % Final   • LDL Cholesterol  08/14/2019 114* 0 - 100 mg/dL Final   • Vitamin B-12 08/14/2019 511  211 - 946 pg/mL Final   • TSH 08/14/2019 1.240  0.270 - 4.200 mIU/mL Final   ]

## 2019-08-20 NOTE — PATIENT INSTRUCTIONS

## 2019-09-17 RX ORDER — ATORVASTATIN CALCIUM 10 MG/1
10 TABLET, FILM COATED ORAL DAILY
Qty: 90 TABLET | Refills: 3 | Status: SHIPPED | OUTPATIENT
Start: 2019-09-17 | End: 2020-08-26

## 2019-09-25 RX ORDER — TRIAMTERENE AND HYDROCHLOROTHIAZIDE 75; 50 MG/1; MG/1
0.5 TABLET ORAL DAILY PRN
Qty: 30 TABLET | Refills: 0 | Status: SHIPPED | OUTPATIENT
Start: 2019-09-25 | End: 2019-10-23 | Stop reason: SDUPTHER

## 2019-09-30 RX ORDER — MEMANTINE HYDROCHLORIDE 10 MG/1
10 TABLET ORAL DAILY
Qty: 60 TABLET | Refills: 11 | Status: SHIPPED | OUTPATIENT
Start: 2019-09-30 | End: 2020-10-22

## 2019-09-30 RX ORDER — DONEPEZIL HYDROCHLORIDE 10 MG/1
10 TABLET, FILM COATED ORAL NIGHTLY
Qty: 30 TABLET | Refills: 11 | Status: SHIPPED | OUTPATIENT
Start: 2019-09-30 | End: 2020-09-23

## 2019-09-30 RX ORDER — DONEPEZIL HYDROCHLORIDE 10 MG/1
10 TABLET, FILM COATED ORAL NIGHTLY
Qty: 30 TABLET | Refills: 11 | Status: SHIPPED | OUTPATIENT
Start: 2019-09-30 | End: 2019-10-25 | Stop reason: SDUPTHER

## 2019-10-09 RX ORDER — AMLODIPINE BESYLATE 5 MG/1
5 TABLET ORAL DAILY
Qty: 30 TABLET | Refills: 11 | Status: SHIPPED | OUTPATIENT
Start: 2019-10-09 | End: 2019-10-25 | Stop reason: SDUPTHER

## 2019-10-23 RX ORDER — TRIAMTERENE AND HYDROCHLOROTHIAZIDE 75; 50 MG/1; MG/1
TABLET ORAL
Qty: 30 TABLET | Refills: 0 | Status: SHIPPED | OUTPATIENT
Start: 2019-10-23 | End: 2020-01-29

## 2019-10-25 ENCOUNTER — LAB (OUTPATIENT)
Dept: LAB | Facility: OTHER | Age: 64
End: 2019-10-25

## 2019-10-25 ENCOUNTER — OFFICE VISIT (OUTPATIENT)
Dept: FAMILY MEDICINE CLINIC | Facility: CLINIC | Age: 64
End: 2019-10-25

## 2019-10-25 VITALS
WEIGHT: 184 LBS | HEART RATE: 68 BPM | TEMPERATURE: 97.8 F | SYSTOLIC BLOOD PRESSURE: 108 MMHG | DIASTOLIC BLOOD PRESSURE: 60 MMHG | HEIGHT: 60 IN | BODY MASS INDEX: 36.12 KG/M2

## 2019-10-25 DIAGNOSIS — R41.82 ALTERED MENTAL STATUS, UNSPECIFIED ALTERED MENTAL STATUS TYPE: ICD-10-CM

## 2019-10-25 DIAGNOSIS — F33.41 RECURRENT MAJOR DEPRESSIVE DISORDER, IN PARTIAL REMISSION (HCC): Chronic | ICD-10-CM

## 2019-10-25 DIAGNOSIS — G30.0 EARLY ONSET ALZHEIMER'S DEMENTIA WITHOUT BEHAVIORAL DISTURBANCE (HCC): Primary | Chronic | ICD-10-CM

## 2019-10-25 DIAGNOSIS — F02.80 EARLY ONSET ALZHEIMER'S DEMENTIA WITHOUT BEHAVIORAL DISTURBANCE (HCC): Primary | Chronic | ICD-10-CM

## 2019-10-25 LAB
ALBUMIN SERPL-MCNC: 4.1 G/DL (ref 3.5–5)
ALBUMIN/GLOB SERPL: 1.5 G/DL (ref 1.1–1.8)
ALP SERPL-CCNC: 90 U/L (ref 38–126)
ALT SERPL W P-5'-P-CCNC: 13 U/L
ANION GAP SERPL CALCULATED.3IONS-SCNC: 5 MMOL/L (ref 5–15)
AST SERPL-CCNC: 20 U/L (ref 14–36)
BASOPHILS # BLD AUTO: 0.02 10*3/MM3 (ref 0–0.2)
BASOPHILS NFR BLD AUTO: 0.3 % (ref 0–1.5)
BILIRUB SERPL-MCNC: 0.4 MG/DL (ref 0.2–1.3)
BILIRUB UR QL STRIP: NEGATIVE
BUN BLD-MCNC: 30 MG/DL (ref 7–23)
BUN/CREAT SERPL: 24.6 (ref 7–25)
CALCIUM SPEC-SCNC: 9.4 MG/DL (ref 8.4–10.2)
CHLORIDE SERPL-SCNC: 107 MMOL/L (ref 101–112)
CLARITY UR: CLEAR
CO2 SERPL-SCNC: 31 MMOL/L (ref 22–30)
COLOR UR: YELLOW
CREAT BLD-MCNC: 1.22 MG/DL (ref 0.52–1.04)
DEPRECATED RDW RBC AUTO: 43.3 FL (ref 37–54)
EOSINOPHIL # BLD AUTO: 0.22 10*3/MM3 (ref 0–0.4)
EOSINOPHIL NFR BLD AUTO: 3.3 % (ref 0.3–6.2)
ERYTHROCYTE [DISTWIDTH] IN BLOOD BY AUTOMATED COUNT: 13.7 % (ref 12.3–15.4)
GFR SERPL CREATININE-BSD FRML MDRD: 44 ML/MIN/1.73 (ref 45–104)
GLOBULIN UR ELPH-MCNC: 2.7 GM/DL (ref 2.3–3.5)
GLUCOSE BLD-MCNC: 105 MG/DL (ref 70–99)
GLUCOSE UR STRIP-MCNC: NEGATIVE MG/DL
HCT VFR BLD AUTO: 37.5 % (ref 34–46.6)
HGB BLD-MCNC: 12.3 G/DL (ref 12–15.9)
HGB UR QL STRIP.AUTO: NEGATIVE
KETONES UR QL STRIP: NEGATIVE
LEUKOCYTE ESTERASE UR QL STRIP.AUTO: NEGATIVE
LYMPHOCYTES # BLD AUTO: 1.64 10*3/MM3 (ref 0.7–3.1)
LYMPHOCYTES NFR BLD AUTO: 24.6 % (ref 19.6–45.3)
MCH RBC QN AUTO: 29.1 PG (ref 26.6–33)
MCHC RBC AUTO-ENTMCNC: 32.8 G/DL (ref 31.5–35.7)
MCV RBC AUTO: 88.7 FL (ref 79–97)
MONOCYTES # BLD AUTO: 0.52 10*3/MM3 (ref 0.1–0.9)
MONOCYTES NFR BLD AUTO: 7.8 % (ref 5–12)
NEUTROPHILS # BLD AUTO: 4.26 10*3/MM3 (ref 1.7–7)
NEUTROPHILS NFR BLD AUTO: 64 % (ref 42.7–76)
NITRITE UR QL STRIP: NEGATIVE
PH UR STRIP.AUTO: 7 [PH] (ref 5.5–8)
PLATELET # BLD AUTO: 204 10*3/MM3 (ref 140–450)
PMV BLD AUTO: 11.9 FL (ref 6–12)
POTASSIUM BLD-SCNC: 5 MMOL/L (ref 3.4–5)
PROT SERPL-MCNC: 6.8 G/DL (ref 6.3–8.6)
PROT UR QL STRIP: NEGATIVE
RBC # BLD AUTO: 4.23 10*6/MM3 (ref 3.77–5.28)
SODIUM BLD-SCNC: 143 MMOL/L (ref 137–145)
SP GR UR STRIP: 1.02 (ref 1–1.03)
UROBILINOGEN UR QL STRIP: NORMAL
WBC NRBC COR # BLD: 6.66 10*3/MM3 (ref 3.4–10.8)

## 2019-10-25 PROCEDURE — 85025 COMPLETE CBC W/AUTO DIFF WBC: CPT | Performed by: INTERNAL MEDICINE

## 2019-10-25 PROCEDURE — 80053 COMPREHEN METABOLIC PANEL: CPT | Performed by: INTERNAL MEDICINE

## 2019-10-25 PROCEDURE — 99213 OFFICE O/P EST LOW 20 MIN: CPT | Performed by: INTERNAL MEDICINE

## 2019-10-25 PROCEDURE — 36415 COLL VENOUS BLD VENIPUNCTURE: CPT | Performed by: INTERNAL MEDICINE

## 2019-10-25 PROCEDURE — 81003 URINALYSIS AUTO W/O SCOPE: CPT | Performed by: INTERNAL MEDICINE

## 2019-10-25 NOTE — PATIENT INSTRUCTIONS

## 2019-10-30 NOTE — PROGRESS NOTES
Subjective     Memory Loss   Pertinent negatives include no abdominal pain, chest pain, chills, congestion, coughing, fatigue, fever, nausea, rash, sore throat, vomiting or weakness.        Andria Goldstein is a 64 y.o. female with multiple medical issues including Alzheimer's dementia, for which she continues on Aricept and Namenda.  She as well as her  and daughter feel her memory is worsening despite being on the combination of Aricept and Namenda.  She scored 24/30 on MMSE today, which is decreased from 26/30 in 2017 .  Her family has noticed gradual memory decline over the past few months.  Denies auditory or visual hallucinations.  She is forgetting where she places things including her medications and has been asking repetite questions.  She continues on Effexor for anxiety and reports compliance with the Effexor as well as the Namenda and Aricept. She denies any unusual stressors recently. She reports symptoms of viral gastroenteritis last week, which have resolved, but she has also been experiencing symptoms of UTI with increased urination.  We will check urinalysis today.  Hopefully this may explain some of her mental function changes.        Review of Systems   Constitutional: Negative for chills, fatigue and fever.   HENT: Negative for congestion, ear pain, postnasal drip, sinus pressure and sore throat.    Respiratory: Negative for cough, shortness of breath and wheezing.    Cardiovascular: Negative for chest pain, palpitations and leg swelling.   Gastrointestinal: Negative for abdominal pain, blood in stool, constipation, diarrhea, nausea and vomiting.   Endocrine: Negative for cold intolerance, heat intolerance, polydipsia and polyuria.   Genitourinary: Negative for dysuria, frequency, hematuria and urgency.   Skin: Negative for rash.   Neurological: Negative for syncope and weakness.   Psychiatric/Behavioral: Positive for confusion.        Objective     Visit Vitals  /60   Pulse 68  "  Temp 97.8 °F (36.6 °C) (Oral)   Ht 152.4 cm (60\")   Wt 83.5 kg (184 lb)   Breastfeeding? No   BMI 35.94 kg/m²     Physical Exam   Constitutional: She is oriented to person, place, and time. She appears well-developed and well-nourished. No distress.   Accompanied by  and daughter.    HENT:   Head: Normocephalic and atraumatic.   Nose: Nose normal.   Mouth/Throat: Oropharynx is clear and moist. No oropharyngeal exudate.   Eyes: EOM are normal. Pupils are equal, round, and reactive to light.   Neck: Neck supple. No JVD present. No thyromegaly present.   Cardiovascular: Normal rate, regular rhythm and normal heart sounds.   Pulmonary/Chest: Effort normal and breath sounds normal. No accessory muscle usage. No respiratory distress. She has no wheezes. She has no rales.   A few chronic lung sounds.     Abdominal: Soft. Bowel sounds are normal. She exhibits no distension. There is no tenderness.   Obese abdomen.    Musculoskeletal: She exhibits no edema.   Lymphadenopathy:     She has no cervical adenopathy.   Neurological: She is alert and oriented to person, place, and time. No cranial nerve deficit.   Psychiatric: She has a normal mood and affect. Her speech is normal and behavior is normal. Judgment and thought content normal.       Future Appointments   Date Time Provider Department Center   2/24/2020 10:30 AM Christofer Mckeon MD [facility] None       Assessment/Plan      We will have her stop by the lab today for CBC, CMP and urinalysis to check for UTI.   I asked the patient and her  to make sure she is compliant with her medications including Aricept and Namenda as well as Effexor.  We will notify of results and treat pending results.      Return in February for routine follow up with fasting labs one week prior.     Scribed for Dr. Mckeon by Cesilia Gutierrez [facility].     Diagnoses and all orders for this visit:    Early onset Alzheimer's dementia without behavioral disturbance (CMS/HCC)    Altered " mental status, unspecified altered mental status type  -     CBC Auto Differential; Future  -     Comprehensive Metabolic Panel; Future  -     Urinalysis With Culture If Indicated -; Future    Recurrent major depressive disorder, in partial remission (CMS/HCC)    Other orders  -     SCANNED COGNITIVE ASSESSMENT        No visits with results within 3 Week(s) from this visit.   Latest known visit with results is:   Lab on 08/14/2019   Component Date Value Ref Range Status   • WBC 08/14/2019 6.50  3.40 - 10.80 10*3/mm3 Final   • RBC 08/14/2019 4.42  3.77 - 5.28 10*6/mm3 Final   • Hemoglobin 08/14/2019 12.8  12.0 - 15.9 g/dL Final   • Hematocrit 08/14/2019 39.1  34.0 - 46.6 % Final   • MCV 08/14/2019 88.5  79.0 - 97.0 fL Final   • MCH 08/14/2019 29.0  26.6 - 33.0 pg Final   • MCHC 08/14/2019 32.7  31.5 - 35.7 g/dL Final   • RDW 08/14/2019 14.7  12.3 - 15.4 % Final   • RDW-SD 08/14/2019 47.1  37.0 - 54.0 fl Final   • MPV 08/14/2019 11.8  6.0 - 12.0 fL Final   • Platelets 08/14/2019 192  140 - 450 10*3/mm3 Final   • Neutrophil % 08/14/2019 71.4  42.7 - 76.0 % Final   • Lymphocyte % 08/14/2019 17.5* 19.6 - 45.3 % Final   • Monocyte % 08/14/2019 6.9  5.0 - 12.0 % Final   • Eosinophil % 08/14/2019 4.0  0.3 - 6.2 % Final   • Basophil % 08/14/2019 0.2  0.0 - 1.5 % Final   • Neutrophils, Absolute 08/14/2019 4.64  1.70 - 7.00 10*3/mm3 Final   • Lymphocytes, Absolute 08/14/2019 1.14  0.70 - 3.10 10*3/mm3 Final   • Monocytes, Absolute 08/14/2019 0.45  0.10 - 0.90 10*3/mm3 Final   • Eosinophils, Absolute 08/14/2019 0.26  0.00 - 0.40 10*3/mm3 Final   • Basophils, Absolute 08/14/2019 0.01  0.00 - 0.20 10*3/mm3 Final   • Glucose 08/14/2019 104* 70 - 99 mg/dL Final   • BUN 08/14/2019 14  7 - 23 mg/dL Final   • Creatinine 08/14/2019 0.73  0.52 - 1.04 mg/dL Final   • Sodium 08/14/2019 147* 137 - 145 mmol/L Final   • Potassium 08/14/2019 4.2  3.4 - 5.0 mmol/L Final   • Chloride 08/14/2019 106  101 - 112 mmol/L Final   • CO2 08/14/2019  32.0* 22.0 - 30.0 mmol/L Final   • Calcium 08/14/2019 9.7  8.4 - 10.2 mg/dL Final   • Total Protein 08/14/2019 6.8  6.3 - 8.6 g/dL Final   • Albumin 08/14/2019 4.00  3.50 - 5.00 g/dL Final   • ALT (SGPT) 08/14/2019 16  <=35 U/L Final   • AST (SGOT) 08/14/2019 24  14 - 36 U/L Final   • Alkaline Phosphatase 08/14/2019 97  38 - 126 U/L Final   • Total Bilirubin 08/14/2019 0.8  0.2 - 1.3 mg/dL Final   • eGFR Non African Amer 08/14/2019 80  45 - 104 mL/min/1.73 Final   • Globulin 08/14/2019 2.8  2.3 - 3.5 gm/dL Final   • A/G Ratio 08/14/2019 1.4  1.1 - 1.8 g/dL Final   • BUN/Creatinine Ratio 08/14/2019 19.2  7.0 - 25.0 Final   • Anion Gap 08/14/2019 9.0  5.0 - 15.0 mmol/L Final   • Hemoglobin A1C 08/14/2019 5.63* 4.80 - 5.60 % Final   • LDL Cholesterol  08/14/2019 114* 0 - 100 mg/dL Final   • Vitamin B-12 08/14/2019 511  211 - 946 pg/mL Final   • TSH 08/14/2019 1.240  0.270 - 4.200 mIU/mL Final   ]

## 2019-11-12 ENCOUNTER — OFFICE VISIT (OUTPATIENT)
Dept: FAMILY MEDICINE CLINIC | Facility: CLINIC | Age: 64
End: 2019-11-12

## 2019-11-12 VITALS
BODY MASS INDEX: 35.81 KG/M2 | TEMPERATURE: 97.6 F | WEIGHT: 182.4 LBS | OXYGEN SATURATION: 97 % | SYSTOLIC BLOOD PRESSURE: 130 MMHG | HEIGHT: 60 IN | HEART RATE: 62 BPM | DIASTOLIC BLOOD PRESSURE: 80 MMHG

## 2019-11-12 DIAGNOSIS — H69.80 DYSFUNCTION OF EUSTACHIAN TUBE, UNSPECIFIED LATERALITY: ICD-10-CM

## 2019-11-12 DIAGNOSIS — F02.80 EARLY ONSET ALZHEIMER'S DEMENTIA WITHOUT BEHAVIORAL DISTURBANCE (HCC): Chronic | ICD-10-CM

## 2019-11-12 DIAGNOSIS — R41.82 ALTERED MENTAL STATUS, UNSPECIFIED ALTERED MENTAL STATUS TYPE: ICD-10-CM

## 2019-11-12 DIAGNOSIS — G30.0 EARLY ONSET ALZHEIMER'S DEMENTIA WITHOUT BEHAVIORAL DISTURBANCE (HCC): Chronic | ICD-10-CM

## 2019-11-12 DIAGNOSIS — H65.02 ACUTE SEROUS OTITIS MEDIA OF LEFT EAR, RECURRENCE NOT SPECIFIED: Primary | ICD-10-CM

## 2019-11-12 PROCEDURE — 96372 THER/PROPH/DIAG INJ SC/IM: CPT | Performed by: INTERNAL MEDICINE

## 2019-11-12 PROCEDURE — 99214 OFFICE O/P EST MOD 30 MIN: CPT | Performed by: INTERNAL MEDICINE

## 2019-11-12 RX ORDER — TIZANIDINE 4 MG/1
4 TABLET ORAL NIGHTLY PRN
Qty: 30 TABLET | Refills: 5 | Status: SHIPPED | OUTPATIENT
Start: 2019-11-12 | End: 2019-12-06

## 2019-11-12 RX ORDER — TRIAMCINOLONE ACETONIDE 40 MG/ML
20 INJECTION, SUSPENSION INTRA-ARTICULAR; INTRAMUSCULAR ONCE
Status: COMPLETED | OUTPATIENT
Start: 2019-11-12 | End: 2019-11-12

## 2019-11-12 RX ORDER — CEFUROXIME AXETIL 500 MG/1
500 TABLET ORAL
Qty: 20 TABLET | Refills: 0 | Status: SHIPPED | OUTPATIENT
Start: 2019-11-12 | End: 2019-11-22

## 2019-11-12 RX ORDER — METHYLPREDNISOLONE ACETATE 80 MG/ML
80 INJECTION, SUSPENSION INTRA-ARTICULAR; INTRALESIONAL; INTRAMUSCULAR; SOFT TISSUE ONCE
Status: COMPLETED | OUTPATIENT
Start: 2019-11-12 | End: 2019-11-12

## 2019-11-12 RX ADMIN — METHYLPREDNISOLONE ACETATE 80 MG: 80 INJECTION, SUSPENSION INTRA-ARTICULAR; INTRALESIONAL; INTRAMUSCULAR; SOFT TISSUE at 15:44

## 2019-11-12 RX ADMIN — TRIAMCINOLONE ACETONIDE 20 MG: 40 INJECTION, SUSPENSION INTRA-ARTICULAR; INTRAMUSCULAR at 15:44

## 2019-11-12 NOTE — PATIENT INSTRUCTIONS

## 2019-11-12 NOTE — PROGRESS NOTES
"Subjective        History of Present Illness     Andria Goldstein is a 64 y.o. female who comes in reporting 4-day history of left-sided earache, occasional episodic nonproductive cough, and mild scratchy throat.  She is also describing occasional symptoms of eustachian tube dysfunction. Denies wheezing.  Denies drainage from the ear.  Her  thinks she has not been hearing quite as well for the past couple days.  She reports low-grade fever initially, but not the past couple of days.  She has been putting some cortisporin otic drops in the left ear with very minimal improvement.     She feels recent symptoms of altered mental status she was experiencing last month are improved with treating mild dehydration.  See last note for more details.  Her  is also in agreement.   Her memory changes seemed to be causing her not to hydrate adequately.  She continues on Aricept, Namenda, and Effexor XR.      Review of Systems   Constitutional: Negative for chills, fatigue and fever.   HENT: Positive for ear pain. Negative for congestion, postnasal drip, sinus pressure and sore throat.    Respiratory: Positive for cough. Negative for shortness of breath and wheezing.    Cardiovascular: Negative for chest pain, palpitations and leg swelling.   Gastrointestinal: Negative for abdominal pain, blood in stool, constipation, diarrhea, nausea and vomiting.   Endocrine: Negative for cold intolerance, heat intolerance, polydipsia and polyuria.   Genitourinary: Negative for dysuria, frequency, hematuria and urgency.   Skin: Negative for rash.   Neurological: Negative for syncope and weakness.        Objective      Visit Vitals  /80   Pulse 62   Temp 97.6 °F (36.4 °C) (Oral)   Ht 152.4 cm (60\")   Wt 82.7 kg (182 lb 6.4 oz)   SpO2 97%   Breastfeeding? No   BMI 35.62 kg/m²       Physical Exam   Constitutional: She is oriented to person, place, and time. She appears well-developed and well-nourished. No distress. "   Accompanied by .    HENT:   Head: Normocephalic and atraumatic.   Nose: Nose normal.   Mouth/Throat: Oropharynx is clear and moist. No oropharyngeal exudate.   Bilateral TMs mildly injected with cloudy effusions bilaterally. Clear postnasal drainage.       Eyes: EOM are normal. Pupils are equal, round, and reactive to light.   Neck: Neck supple. No JVD present. No thyromegaly present.   Cardiovascular: Normal rate, regular rhythm and normal heart sounds.   Pulmonary/Chest: Effort normal and breath sounds normal. No accessory muscle usage. No respiratory distress. She has no wheezes. She has no rales.   Diminished excursion on expiratory phase of cough.  No wheezes or rhonchi.   Abdominal: Soft. Bowel sounds are normal. She exhibits no distension. There is no tenderness.   Musculoskeletal: She exhibits no edema.   Lymphadenopathy:     She has no cervical adenopathy.   Neurological: She is alert and oriented to person, place, and time. No cranial nerve deficit.   Psychiatric: She has a normal mood and affect. Her speech is normal and behavior is normal. Judgment and thought content normal.     Future Appointments   Date Time Provider Department Center   2/24/2020 10:30 AM Christofer Mckeon MD [facility] None       Assessment/Plan      For the new problems of bilateral acute serous otitis media and eustachian tube dysfunction, I sent a prescription for Ceftin 500 mg to take one b.i.d. x 10 days.  Stop the cortisporin otic.  After informed verbal consent, patient is given Kenalog 20 mg and Depo-Medrol 80 mg IM without difficulty or complications.  Patient tolerated well without complications.    Continue the Effexor XR and Aricept and Namenda.  Remind the patient frequently to hydrate.    Return in February for routine follow up with fasting labs one week prior or sooner if needed.     Scribed for Dr. Mckeon by Cesilia Gutierrez [facility].     Diagnoses and all orders for this visit:    Acute serous otitis media of  left ear, recurrence not specified    Dysfunction of Eustachian tube, unspecified laterality  -     methylPREDNISolone acetate (DEPO-medrol) injection 80 mg  -     triamcinolone acetonide (KENALOG-40) injection 20 mg    Altered mental status, unspecified altered mental status type    Early onset Alzheimer's dementia without behavioral disturbance (CMS/HCC)    Other orders  -     cefuroxime (CEFTIN) 500 MG tablet; Take 1 tablet by mouth 2 (Two) Times a Day for 10 days.        Lab on 10/25/2019   Component Date Value Ref Range Status   • WBC 10/25/2019 6.66  3.40 - 10.80 10*3/mm3 Final   • RBC 10/25/2019 4.23  3.77 - 5.28 10*6/mm3 Final   • Hemoglobin 10/25/2019 12.3  12.0 - 15.9 g/dL Final   • Hematocrit 10/25/2019 37.5  34.0 - 46.6 % Final   • MCV 10/25/2019 88.7  79.0 - 97.0 fL Final   • MCH 10/25/2019 29.1  26.6 - 33.0 pg Final   • MCHC 10/25/2019 32.8  31.5 - 35.7 g/dL Final   • RDW 10/25/2019 13.7  12.3 - 15.4 % Final   • RDW-SD 10/25/2019 43.3  37.0 - 54.0 fl Final   • MPV 10/25/2019 11.9  6.0 - 12.0 fL Final   • Platelets 10/25/2019 204  140 - 450 10*3/mm3 Final   • Neutrophil % 10/25/2019 64.0  42.7 - 76.0 % Final   • Lymphocyte % 10/25/2019 24.6  19.6 - 45.3 % Final   • Monocyte % 10/25/2019 7.8  5.0 - 12.0 % Final   • Eosinophil % 10/25/2019 3.3  0.3 - 6.2 % Final   • Basophil % 10/25/2019 0.3  0.0 - 1.5 % Final   • Neutrophils, Absolute 10/25/2019 4.26  1.70 - 7.00 10*3/mm3 Final   • Lymphocytes, Absolute 10/25/2019 1.64  0.70 - 3.10 10*3/mm3 Final   • Monocytes, Absolute 10/25/2019 0.52  0.10 - 0.90 10*3/mm3 Final   • Eosinophils, Absolute 10/25/2019 0.22  0.00 - 0.40 10*3/mm3 Final   • Basophils, Absolute 10/25/2019 0.02  0.00 - 0.20 10*3/mm3 Final   • Glucose 10/25/2019 105* 70 - 99 mg/dL Final   • BUN 10/25/2019 30* 7 - 23 mg/dL Final   • Creatinine 10/25/2019 1.22* 0.52 - 1.04 mg/dL Final   • Sodium 10/25/2019 143  137 - 145 mmol/L Final   • Potassium 10/25/2019 5.0  3.4 - 5.0 mmol/L Final   •  Chloride 10/25/2019 107  101 - 112 mmol/L Final   • CO2 10/25/2019 31.0* 22.0 - 30.0 mmol/L Final   • Calcium 10/25/2019 9.4  8.4 - 10.2 mg/dL Final   • Total Protein 10/25/2019 6.8  6.3 - 8.6 g/dL Final   • Albumin 10/25/2019 4.10  3.50 - 5.00 g/dL Final   • ALT (SGPT) 10/25/2019 13  <=35 U/L Final   • AST (SGOT) 10/25/2019 20  14 - 36 U/L Final   • Alkaline Phosphatase 10/25/2019 90  38 - 126 U/L Final   • Total Bilirubin 10/25/2019 0.4  0.2 - 1.3 mg/dL Final   • eGFR Non African Amer 10/25/2019 44* 45 - 104 mL/min/1.73 Final   • Globulin 10/25/2019 2.7  2.3 - 3.5 gm/dL Final   • A/G Ratio 10/25/2019 1.5  1.1 - 1.8 g/dL Final   • BUN/Creatinine Ratio 10/25/2019 24.6  7.0 - 25.0 Final   • Anion Gap 10/25/2019 5.0  5.0 - 15.0 mmol/L Final   • Color, UA 10/25/2019 Yellow  Yellow, Straw Final   • Appearance, UA 10/25/2019 Clear  Clear Final   • pH, UA 10/25/2019 7.0  5.5 - 8.0 Final   • Specific Gravity, UA 10/25/2019 1.020  1.005 - 1.030 Final   • Glucose, UA 10/25/2019 Negative  Negative Final   • Ketones, UA 10/25/2019 Negative  Negative Final   • Bilirubin, UA 10/25/2019 Negative  Negative Final   • Blood, UA 10/25/2019 Negative  Negative Final   • Protein, UA 10/25/2019 Negative  Negative Final   • Leuk Esterase, UA 10/25/2019 Negative  Negative Final   • Nitrite, UA 10/25/2019 Negative  Negative Final   • Urobilinogen, UA 10/25/2019 0.2 E.U./dL  0.2 - 1.0 E.U./dL Final   ]

## 2019-12-05 DIAGNOSIS — R19.7 DIARRHEA, UNSPECIFIED TYPE: Primary | ICD-10-CM

## 2019-12-06 ENCOUNTER — OFFICE VISIT (OUTPATIENT)
Dept: FAMILY MEDICINE CLINIC | Facility: CLINIC | Age: 64
End: 2019-12-06

## 2019-12-06 ENCOUNTER — LAB (OUTPATIENT)
Dept: LAB | Facility: OTHER | Age: 64
End: 2019-12-06

## 2019-12-06 VITALS
OXYGEN SATURATION: 97 % | HEIGHT: 60 IN | DIASTOLIC BLOOD PRESSURE: 64 MMHG | TEMPERATURE: 97.2 F | RESPIRATION RATE: 16 BRPM | BODY MASS INDEX: 34.95 KG/M2 | HEART RATE: 55 BPM | SYSTOLIC BLOOD PRESSURE: 110 MMHG | WEIGHT: 178 LBS

## 2019-12-06 DIAGNOSIS — E87.5 HYPERKALEMIA: ICD-10-CM

## 2019-12-06 DIAGNOSIS — R11.0 NAUSEA: ICD-10-CM

## 2019-12-06 DIAGNOSIS — R53.83 OTHER FATIGUE: ICD-10-CM

## 2019-12-06 DIAGNOSIS — R19.7 DIARRHEA, UNSPECIFIED TYPE: Primary | ICD-10-CM

## 2019-12-06 DIAGNOSIS — R19.7 DIARRHEA, UNSPECIFIED TYPE: ICD-10-CM

## 2019-12-06 DIAGNOSIS — R25.2 LEG CRAMP: ICD-10-CM

## 2019-12-06 LAB
ALBUMIN SERPL-MCNC: 4.3 G/DL (ref 3.5–5)
ALBUMIN/GLOB SERPL: 1.5 G/DL (ref 1.1–1.8)
ALP SERPL-CCNC: 99 U/L (ref 38–126)
ALT SERPL W P-5'-P-CCNC: 14 U/L
ANION GAP SERPL CALCULATED.3IONS-SCNC: 6 MMOL/L (ref 5–15)
AST SERPL-CCNC: 21 U/L (ref 14–36)
BASOPHILS # BLD AUTO: 0.03 10*3/MM3 (ref 0–0.2)
BASOPHILS NFR BLD AUTO: 0.4 % (ref 0–1.5)
BILIRUB SERPL-MCNC: 0.5 MG/DL (ref 0.2–1.3)
BUN BLD-MCNC: 28 MG/DL (ref 7–23)
BUN/CREAT SERPL: 23.7 (ref 7–25)
CALCIUM SPEC-SCNC: 9.7 MG/DL (ref 8.4–10.2)
CHLORIDE SERPL-SCNC: 104 MMOL/L (ref 101–112)
CO2 SERPL-SCNC: 32 MMOL/L (ref 22–30)
CREAT BLD-MCNC: 1.18 MG/DL (ref 0.52–1.04)
DEPRECATED RDW RBC AUTO: 47.2 FL (ref 37–54)
EOSINOPHIL # BLD AUTO: 0.23 10*3/MM3 (ref 0–0.4)
EOSINOPHIL NFR BLD AUTO: 3.2 % (ref 0.3–6.2)
ERYTHROCYTE [DISTWIDTH] IN BLOOD BY AUTOMATED COUNT: 14.7 % (ref 12.3–15.4)
ERYTHROCYTE [SEDIMENTATION RATE] IN BLOOD: 15 MM/HR (ref 0–20)
GFR SERPL CREATININE-BSD FRML MDRD: 46 ML/MIN/1.73 (ref 45–104)
GLOBULIN UR ELPH-MCNC: 2.9 GM/DL (ref 2.3–3.5)
GLUCOSE BLD-MCNC: 100 MG/DL (ref 70–99)
HCT VFR BLD AUTO: 38.4 % (ref 34–46.6)
HGB BLD-MCNC: 12.4 G/DL (ref 12–15.9)
LYMPHOCYTES # BLD AUTO: 1.46 10*3/MM3 (ref 0.7–3.1)
LYMPHOCYTES NFR BLD AUTO: 20.3 % (ref 19.6–45.3)
MAGNESIUM SERPL-MCNC: 2.1 MG/DL (ref 1.6–2.3)
MCH RBC QN AUTO: 29.1 PG (ref 26.6–33)
MCHC RBC AUTO-ENTMCNC: 32.3 G/DL (ref 31.5–35.7)
MCV RBC AUTO: 90.1 FL (ref 79–97)
MONOCYTES # BLD AUTO: 0.48 10*3/MM3 (ref 0.1–0.9)
MONOCYTES NFR BLD AUTO: 6.7 % (ref 5–12)
NEUTROPHILS # BLD AUTO: 5 10*3/MM3 (ref 1.7–7)
NEUTROPHILS NFR BLD AUTO: 69.4 % (ref 42.7–76)
PLATELET # BLD AUTO: 203 10*3/MM3 (ref 140–450)
PMV BLD AUTO: 11.9 FL (ref 6–12)
POTASSIUM BLD-SCNC: 5.2 MMOL/L (ref 3.4–5)
PROT SERPL-MCNC: 7.2 G/DL (ref 6.3–8.6)
RBC # BLD AUTO: 4.26 10*6/MM3 (ref 3.77–5.28)
SODIUM BLD-SCNC: 142 MMOL/L (ref 137–145)
WBC NRBC COR # BLD: 7.2 10*3/MM3 (ref 3.4–10.8)

## 2019-12-06 PROCEDURE — 80053 COMPREHEN METABOLIC PANEL: CPT | Performed by: NURSE PRACTITIONER

## 2019-12-06 PROCEDURE — 83735 ASSAY OF MAGNESIUM: CPT | Performed by: NURSE PRACTITIONER

## 2019-12-06 PROCEDURE — 99214 OFFICE O/P EST MOD 30 MIN: CPT | Performed by: NURSE PRACTITIONER

## 2019-12-06 PROCEDURE — 86677 HELICOBACTER PYLORI ANTIBODY: CPT | Performed by: NURSE PRACTITIONER

## 2019-12-06 PROCEDURE — 85651 RBC SED RATE NONAUTOMATED: CPT | Performed by: NURSE PRACTITIONER

## 2019-12-06 PROCEDURE — 85025 COMPLETE CBC W/AUTO DIFF WBC: CPT | Performed by: NURSE PRACTITIONER

## 2019-12-06 PROCEDURE — 36415 COLL VENOUS BLD VENIPUNCTURE: CPT | Performed by: NURSE PRACTITIONER

## 2019-12-06 RX ORDER — LISINOPRIL AND HYDROCHLOROTHIAZIDE 20; 12.5 MG/1; MG/1
1 TABLET ORAL DAILY
COMMUNITY
End: 2020-01-12

## 2019-12-06 RX ORDER — METRONIDAZOLE 500 MG/1
500 TABLET ORAL 2 TIMES DAILY
Qty: 20 TABLET | Refills: 0 | Status: SHIPPED | OUTPATIENT
Start: 2019-12-06 | End: 2019-12-16

## 2019-12-06 RX ORDER — TIZANIDINE 4 MG/1
4 TABLET ORAL NIGHTLY PRN
COMMUNITY
End: 2020-07-02 | Stop reason: SDUPTHER

## 2019-12-06 RX ORDER — DICYCLOMINE HYDROCHLORIDE 10 MG/1
10 CAPSULE ORAL 3 TIMES DAILY PRN
Qty: 60 CAPSULE | Refills: 0 | Status: SHIPPED | OUTPATIENT
Start: 2019-12-06 | End: 2019-12-26

## 2019-12-06 RX ORDER — ONDANSETRON 4 MG/1
4 TABLET, FILM COATED ORAL EVERY 8 HOURS PRN
Qty: 30 TABLET | Refills: 1 | Status: SHIPPED | OUTPATIENT
Start: 2019-12-06

## 2019-12-06 NOTE — PROGRESS NOTES
Subjective   Andria Goldstein is a 64 y.o. female who presents to the office for possible C. difficile.    History of Present Illness     Chronic diarrhea with acute exacerbation-chronic, uncontrolled with OTC Imodium.  - Patient had gallbladder removed a long time ago, she has had diarrhea off and on for years most likely right after she had her gallbladder removed.  Patient states that she had a history of C. difficile 2 to 3 years ago and it seems like this is what she normally experiences with it.  Patient states Monday was the worst she had bowel movements 8-10 times a day felt like her stomach was on fire and felt very nauseous.  Patient states that she has some incontinence at times as well where she cannot make it to the bathroom in time.  Patient unsure about blood in the stool.  Patient is also complaining of fatigue and leg cramps worsening at bedtime along with some fever and chills.  Patient denies any vomiting.  Patient denies any blood or mucus in the stool.  Patient states she eats 1 time a day usually, usually does not snack throughout the day and only drinks water for the most part.  States that eating does make this worse.  Last colonoscopy was in 2016, she said that they were unable to get completely through and view everything because she had a case of C. difficile.  Patient states that she has previously tried some type of stuff she drinks and it was nasty and did not help.  -Plan  of care: Order GI panel to rule out C. difficile and other bacterial/parasites.  Ordered CMP and CBC and magnesium as well as sed rate to look for anemias, electrolytes deficiencies, and inflammation.  Will call patient with results.  Also ordered  H. pylori testing to rule out ulcer.  Gave patient samples of Culturelle to take, will treat as if this is C. difficile with Flagyl 500 mg twice daily x7 days, added Bentyl for the diarrhea, and Zofran as needed for nausea.  Follow-up pending lab results.    The  "following portions of the patient's history were reviewed and updated as appropriate: allergies, current medications, past family history, past medical history, past social history, past surgical history and problem list.    Review of Systems   Constitutional: Positive for activity change, appetite change, chills, diaphoresis, fatigue and fever. Negative for unexpected weight change.   HENT: Negative for congestion, ear discharge, ear pain, nosebleeds, postnasal drip, rhinorrhea, sinus pressure, sinus pain, sneezing, sore throat, tinnitus and trouble swallowing.    Eyes: Negative.    Respiratory: Negative for cough, chest tightness, shortness of breath and wheezing.    Cardiovascular: Negative for chest pain, palpitations and leg swelling.   Gastrointestinal: Positive for abdominal pain, diarrhea and nausea. Negative for abdominal distention, blood in stool, constipation and vomiting.   Genitourinary: Negative for difficulty urinating, dyspareunia, dysuria, flank pain, frequency, hematuria, menstrual problem, vaginal bleeding, vaginal discharge and vaginal pain.   Musculoskeletal: Positive for myalgias.   Skin: Negative for rash and wound.   Allergic/Immunologic: Negative for environmental allergies, food allergies and immunocompromised state.   Neurological: Positive for weakness. Negative for dizziness, tremors, seizures, syncope, light-headedness, numbness and headaches.   Hematological: Does not bruise/bleed easily.   Psychiatric/Behavioral: Negative for behavioral problems, self-injury, sleep disturbance and suicidal ideas. The patient is not nervous/anxious.          Objective   /64   Pulse 55   Temp 97.2 °F (36.2 °C) (Oral)   Resp 16   Ht 152.4 cm (60\")   Wt 80.7 kg (178 lb)   SpO2 97%   Breastfeeding No   BMI 34.76 kg/m²   Physical Exam   Constitutional: She is oriented to person, place, and time. She appears well-developed and well-nourished. She is cooperative. No distress.   Cardiovascular: " Normal rate, regular rhythm, normal heart sounds and intact distal pulses. Exam reveals no gallop and no friction rub.   No murmur heard.  Pulmonary/Chest: Effort normal and breath sounds normal. No respiratory distress. She has no wheezes. She has no rales.   Abdominal: Soft. Normal appearance and bowel sounds are normal. She exhibits no shifting dullness, no distension, no pulsatile liver, no fluid wave, no abdominal bruit, no ascites, no pulsatile midline mass and no mass. There is no hepatosplenomegaly. There is generalized tenderness (mild). There is no rigidity, no rebound, no guarding and no CVA tenderness. No hernia.   Neurological: She is alert and oriented to person, place, and time. She is not disoriented. Coordination and gait normal.   Skin: Skin is warm, dry and intact. Capillary refill takes less than 2 seconds. No rash noted. She is not diaphoretic. No pallor.   Psychiatric: She has a normal mood and affect. Her speech is normal and behavior is normal. She is not actively hallucinating. She is attentive.   Nursing note and vitals reviewed.       PHQ-2/PHQ-9 Depression Screening 1/22/2019   Little interest or pleasure in doing things 0   Feeling down, depressed, or hopeless 0   Trouble falling or staying asleep, or sleeping too much 0   Feeling tired or having little energy 0   Poor appetite or overeating 0   Feeling bad about yourself - or that you are a failure or have let yourself or your family down 0   Trouble concentrating on things, such as reading the newspaper or watching television 0   Moving or speaking so slowly that other people could have noticed. Or the opposite - being so fidgety or restless that you have been moving around a lot more than usual 0   Thoughts that you would be better off dead, or of hurting yourself in some way 0   Total Score 0   If you checked off any problems, how difficult have these problems made it for you to do your work, take care of things at home, or get along  with other people? -         Assessment/Plan   Andria was seen today for diarrhea.    Diagnoses and all orders for this visit:    Diarrhea, unspecified type  -     CBC & Differential; Future  -     Comprehensive Metabolic Panel; Future  -     Magnesium; Future  -     Sedimentation Rate; Future  -     metroNIDAZOLE (FLAGYL) 500 MG tablet; Take 1 tablet by mouth 2 (Two) Times a Day for 10 days.  -     Discontinue: dicyclomine (BENTYL) 10 MG capsule; Take 1 capsule by mouth 3 (Three) Times a Day As Needed (diarrhea).  -     H.pylori,IgG / IgA Antibodies; Future    Nausea  -     CBC & Differential; Future  -     Comprehensive Metabolic Panel; Future  -     Magnesium; Future  -     Sedimentation Rate; Future  -     ondansetron (ZOFRAN) 4 MG tablet; Take 1 tablet by mouth Every 8 (Eight) Hours As Needed for Nausea or Vomiting.    Other fatigue  -     CBC & Differential; Future  -     Comprehensive Metabolic Panel; Future  -     Magnesium; Future  -     Sedimentation Rate; Future    Leg cramp  -     CBC & Differential; Future  -     Comprehensive Metabolic Panel; Future  -     Magnesium; Future  -     Sedimentation Rate; Future    Hyperkalemia  -     Comprehensive Metabolic Panel; Future           Chronic diarrhea with acute exacerbation-chronic, uncontrolled with OTC Imodium.  Accompanied with this is fatigue, nausea, muscle cramps.  Acute not improving.  - Patient had gallbladder removed a long time ago, she has had diarrhea off and on for years most likely right after she had her gallbladder removed.  Patient states that she had a history of C. difficile 2 to 3 years ago and it seems like this is what she normally experiences with it.  Patient states Monday was the worst she had bowel movements 8-10 times a day felt like her stomach was on fire and felt very nauseous.  Patient states that she has some incontinence at times as well where she cannot make it to the bathroom in time.  Patient unsure about blood in the  stool.  Patient is also complaining of fatigue and leg cramps worsening at bedtime along with some fever and chills.  Patient denies any vomiting.  Patient denies any blood or mucus in the stool.  Patient states she eats 1 time a day usually, usually does not snack throughout the day and only drinks water for the most part.  States that eating does make this worse.  Last colonoscopy was in 2016, she said that they were unable to get completely through and view everything because she had a case of C. difficile.  Patient states that she has previously tried some type of stuff she drinks and it was nasty and did not help.  -Plan  of care: Order GI panel to rule out C. difficile and other bacterial/parasites.  Ordered CMP and CBC and magnesium as well as sed rate to look for anemias, electrolytes deficiencies, and inflammation.  Will call patient with results.  Also ordered  H. pylori testing to rule out ulcer.  Gave patient samples of Culturelle to take, will treat as if this is C. difficile with Flagyl 500 mg twice daily x7 days, added Bentyl for the diarrhea, and Zofran as needed for nausea.  Follow-up pending lab results.    Patient educated to follow-up sooner than next scheduled appointment if condition(s) worse or do not improve. Patient states understanding and is in agreeance with plan of care. An After Visit Summary was printed and given to the patient.      SHAYE Reyes        This document has been electronically signed by SHAYE Reyes on December 29, 2019 9:44 PM      EMR/Transcription Dragon Disclaimer:  Some of this note may be an electronic dragon transcription/translation of spoken language to printed text. The electronic translation of spoken language may permit erroneous, or at times, nonsensical words or phrases to be inadvertently transcribed. Although I have reviewed the note for such errors, some may still exist.

## 2019-12-08 PROCEDURE — 0097U HC BIOFIRE FILMARRAY GI PANEL: CPT | Performed by: NURSE PRACTITIONER

## 2019-12-09 ENCOUNTER — LAB (OUTPATIENT)
Dept: LAB | Facility: OTHER | Age: 64
End: 2019-12-09

## 2019-12-09 DIAGNOSIS — R19.7 DIARRHEA, UNSPECIFIED TYPE: ICD-10-CM

## 2019-12-09 DIAGNOSIS — E87.5 HYPERKALEMIA: ICD-10-CM

## 2019-12-09 LAB
ADV 40+41 DNA STL QL NAA+NON-PROBE: NOT DETECTED
ALBUMIN SERPL-MCNC: 4.2 G/DL (ref 3.5–5)
ALBUMIN/GLOB SERPL: 1.4 G/DL (ref 1.1–1.8)
ALP SERPL-CCNC: 104 U/L (ref 38–126)
ALT SERPL W P-5'-P-CCNC: 14 U/L
ANION GAP SERPL CALCULATED.3IONS-SCNC: 8 MMOL/L (ref 5–15)
AST SERPL-CCNC: 20 U/L (ref 14–36)
ASTRO TYP 1-8 RNA STL QL NAA+NON-PROBE: NOT DETECTED
BILIRUB SERPL-MCNC: 0.4 MG/DL (ref 0.2–1.3)
BUN BLD-MCNC: 16 MG/DL (ref 7–23)
BUN/CREAT SERPL: 15.5 (ref 7–25)
C CAYETANENSIS DNA STL QL NAA+NON-PROBE: NOT DETECTED
C DIFF TOX GENS STL QL NAA+PROBE: NOT DETECTED
CALCIUM SPEC-SCNC: 10 MG/DL (ref 8.4–10.2)
CAMPY SP DNA.DIARRHEA STL QL NAA+PROBE: NOT DETECTED
CHLORIDE SERPL-SCNC: 103 MMOL/L (ref 101–112)
CO2 SERPL-SCNC: 31 MMOL/L (ref 22–30)
CREAT BLD-MCNC: 1.03 MG/DL (ref 0.52–1.04)
CRYPTOSP STL CULT: NOT DETECTED
E COLI DNA SPEC QL NAA+PROBE: NOT DETECTED
E HISTOLYT AG STL-ACNC: NOT DETECTED
EAEC PAA PLAS AGGR+AATA ST NAA+NON-PRB: NOT DETECTED
EC STX1 + STX2 GENES STL NAA+PROBE: NOT DETECTED
EPEC EAE GENE STL QL NAA+NON-PROBE: NOT DETECTED
ETEC LTA+ST1A+ST1B TOX ST NAA+NON-PROBE: NOT DETECTED
G LAMBLIA DNA SPEC QL NAA+PROBE: NOT DETECTED
GFR SERPL CREATININE-BSD FRML MDRD: 54 ML/MIN/1.73 (ref 45–104)
GLOBULIN UR ELPH-MCNC: 2.9 GM/DL (ref 2.3–3.5)
GLUCOSE BLD-MCNC: 108 MG/DL (ref 70–99)
NOROVIRUS GI+II RNA STL QL NAA+NON-PROBE: NOT DETECTED
P SHIGELLOIDES DNA STL QL NAA+PROBE: NOT DETECTED
POTASSIUM BLD-SCNC: 4.4 MMOL/L (ref 3.4–5)
PROT SERPL-MCNC: 7.1 G/DL (ref 6.3–8.6)
RV RNA STL NAA+PROBE: NOT DETECTED
SALMONELLA DNA SPEC QL NAA+PROBE: NOT DETECTED
SAPO I+II+IV+V RNA STL QL NAA+NON-PROBE: NOT DETECTED
SHIGELLA SP+EIEC IPAH STL QL NAA+PROBE: NOT DETECTED
SODIUM BLD-SCNC: 142 MMOL/L (ref 137–145)
V CHOLERAE DNA SPEC QL NAA+PROBE: NOT DETECTED
VIBRIO DNA SPEC NAA+PROBE: NOT DETECTED
YERSINIA STL CULT: NOT DETECTED

## 2019-12-09 PROCEDURE — 36415 COLL VENOUS BLD VENIPUNCTURE: CPT | Performed by: NURSE PRACTITIONER

## 2019-12-09 PROCEDURE — 80053 COMPREHEN METABOLIC PANEL: CPT | Performed by: NURSE PRACTITIONER

## 2019-12-10 DIAGNOSIS — R19.7 DIARRHEA, UNSPECIFIED TYPE: Primary | ICD-10-CM

## 2019-12-10 DIAGNOSIS — E87.5 HYPERKALEMIA: ICD-10-CM

## 2019-12-10 LAB
H PYLORI IGA SER IA-ACNC: <9 UNITS (ref 0–8.9)
H PYLORI IGG SER IA-ACNC: 0.79 INDEX VALUE (ref 0–0.79)

## 2019-12-11 DIAGNOSIS — E87.5 HYPERKALEMIA: Primary | ICD-10-CM

## 2019-12-11 RX ORDER — HYDROCHLOROTHIAZIDE 12.5 MG/1
12.5 TABLET ORAL DAILY
Qty: 30 TABLET | Refills: 0 | Status: SHIPPED | OUTPATIENT
Start: 2019-12-11 | End: 2020-01-09

## 2019-12-13 ENCOUNTER — TELEPHONE (OUTPATIENT)
Dept: FAMILY MEDICINE CLINIC | Facility: CLINIC | Age: 64
End: 2019-12-13

## 2019-12-13 NOTE — TELEPHONE ENCOUNTER
Pt left message on phone took it down she did not leave nay details on what this call was in regards to left 2 numbers to get a hold of her but did not leave any details.

## 2019-12-16 ENCOUNTER — TELEPHONE (OUTPATIENT)
Dept: FAMILY MEDICINE CLINIC | Facility: CLINIC | Age: 64
End: 2019-12-16

## 2019-12-16 NOTE — TELEPHONE ENCOUNTER
Wednesday  a.m  138/92   p.m 137/66  Thursday  a.m  143/85       p.m 127/85       They did not get HR

## 2019-12-23 ENCOUNTER — OFFICE VISIT (OUTPATIENT)
Dept: FAMILY MEDICINE CLINIC | Facility: CLINIC | Age: 64
End: 2019-12-23

## 2019-12-23 VITALS
SYSTOLIC BLOOD PRESSURE: 110 MMHG | RESPIRATION RATE: 16 BRPM | OXYGEN SATURATION: 98 % | BODY MASS INDEX: 34.36 KG/M2 | HEIGHT: 60 IN | WEIGHT: 175 LBS | HEART RATE: 64 BPM | DIASTOLIC BLOOD PRESSURE: 60 MMHG | TEMPERATURE: 98.6 F

## 2019-12-23 DIAGNOSIS — E87.5 HYPERKALEMIA: ICD-10-CM

## 2019-12-23 DIAGNOSIS — R19.7 POSTCHOLECYSTECTOMY DIARRHEA: Chronic | ICD-10-CM

## 2019-12-23 DIAGNOSIS — Z86.010 HISTORY OF COLON POLYPS: ICD-10-CM

## 2019-12-23 DIAGNOSIS — E86.0 DEHYDRATION: ICD-10-CM

## 2019-12-23 DIAGNOSIS — K91.89 POSTCHOLECYSTECTOMY DIARRHEA: Chronic | ICD-10-CM

## 2019-12-23 DIAGNOSIS — I10 ESSENTIAL HYPERTENSION: ICD-10-CM

## 2019-12-23 DIAGNOSIS — A04.72 CLOSTRIDIUM DIFFICILE DIARRHEA: ICD-10-CM

## 2019-12-23 DIAGNOSIS — K52.9 CHRONIC DIARRHEA: Primary | ICD-10-CM

## 2019-12-23 PROCEDURE — 99214 OFFICE O/P EST MOD 30 MIN: CPT | Performed by: NURSE PRACTITIONER

## 2019-12-23 RX ORDER — DIPHENOXYLATE HYDROCHLORIDE AND ATROPINE SULFATE 2.5; .025 MG/1; MG/1
1 TABLET ORAL 4 TIMES DAILY PRN
Qty: 30 TABLET | Refills: 0 | Status: SHIPPED | OUTPATIENT
Start: 2019-12-23

## 2019-12-26 DIAGNOSIS — R19.7 DIARRHEA, UNSPECIFIED TYPE: ICD-10-CM

## 2019-12-26 RX ORDER — DICYCLOMINE HYDROCHLORIDE 10 MG/1
10 CAPSULE ORAL 3 TIMES DAILY PRN
Qty: 60 CAPSULE | Refills: 5 | Status: SHIPPED | OUTPATIENT
Start: 2019-12-26 | End: 2020-02-10

## 2020-01-09 RX ORDER — HYDROCHLOROTHIAZIDE 12.5 MG/1
TABLET ORAL
Qty: 30 TABLET | Refills: 0 | Status: SHIPPED | OUTPATIENT
Start: 2020-01-09 | End: 2020-02-24 | Stop reason: SDUPTHER

## 2020-01-13 NOTE — PROGRESS NOTES
Subjective   Andria Goldstein is a 64 y.o. female who presents to the office for follow up.     HPI:    Chronic diarrhea with acute exacerbation-chronic, improving with OTC Imodium, bentyl prn, flagyl treatment and zofran prn.  -12/6/19: Patient had gallbladder removed a long time ago, she has had diarrhea off and on for years most likely right after she had her gallbladder removed.  Patient states that she had a history of C. difficile 2 to 3 years ago and it seems like this is what she normally experiences with it.  Patient states Monday was the worst she had bowel movements 8-10 times a day felt like her stomach was on fire and felt very nauseous.  Patient states that she has some incontinence at times as well where she cannot make it to the bathroom in time.  Patient unsure about blood in the stool.  Patient is also complaining of fatigue and leg cramps worsening at bedtime along with some fever and chills.  Patient denies any vomiting.  Patient denies any blood or mucus in the stool.  Patient states she eats 1 time a day usually, usually does not snack throughout the day and only drinks water for the most part.  States that eating does make this worse.  Last colonoscopy was in 2016, she said that they were unable to get completely through and view everything because she had a case of C. difficile.  Patient states that she has previously tried some type of stuff she drinks and it was nasty and did not help. Plan  of care: Order GI panel to rule out C. difficile and other bacterial/parasites.  Ordered CMP and CBC and magnesium as well as sed rate to look for anemias, electrolytes deficiencies, and inflammation.  Will call patient with results.  Also ordered  H. pylori testing to rule out ulcer.  Gave patient samples of Culturelle to take, will treat as if this is C. difficile with Flagyl 500 mg twice daily x7 days, added Bentyl for the diarrhea, and Zofran as needed for nausea.  Follow-up pending lab  results.  -12/6/19 labs showed hpylori neg, GI panel neg, cbc wnl and cmp shows elevated K and elevated Cr.   -12/23/19: pt states dicyclomine 10mg, 2 capsules doesn't work. Pt states this started with GB was removed. Pt gets up around 8am and eats dinner at 5pm and then goes to bed about 730pm. Usually only eats one meal a day. Has failed rifaximin and questran bid and it has not worked. Hx of colonoscopy two years ago, told to f/u in five years. States diarrhea after she eats or drinks anything.   -POC: will try lomotil prn as well. F/u if not improving. Recheck labs in two weeks.    Hyperkalemia/dehydration-acute, new problem, improving without K and lisinopril.   -on 12/6, K was 5.2 and CR was 1.18 and BUN 28. Told pt via phone do discontinue her potassium, hold lisinopril-hctz, take 1/2 of maxzide.  -on 12/9 K and CR back to normal, changed pt to entire maxzide, hctz added.   -POC: continue current plan of care and continue to monitor.     HTN-chronic, controlled currently with hctz 12.5mg, maxzide 75-50mg daily. HOLDING lisinopril hctz.   -pt called in to tell us /86, 141/70, and 123/78.   -POC: continue current POC.      Recheck labs in two weeks. F/u pending labs.       The following portions of the patient's history were reviewed and updated as appropriate: allergies, current medications, past family history, past medical history, past social history, past surgical history and problem list.    Review of Systems   Constitutional: Positive for fatigue. Negative for activity change, appetite change, chills, diaphoresis, fever and unexpected weight change.   HENT: Negative for congestion, ear discharge, ear pain, nosebleeds, postnasal drip, rhinorrhea, sinus pressure, sinus pain, sneezing, sore throat, tinnitus and trouble swallowing.    Eyes: Negative.    Respiratory: Negative for cough, chest tightness, shortness of breath and wheezing.    Cardiovascular: Negative for chest pain, palpitations and leg  "swelling.   Gastrointestinal: Positive for abdominal pain (improving), diarrhea and nausea. Negative for abdominal distention, blood in stool, constipation and vomiting.   Genitourinary: Negative for difficulty urinating, dyspareunia, dysuria, flank pain, frequency, hematuria, menstrual problem, vaginal bleeding, vaginal discharge and vaginal pain.   Musculoskeletal: Positive for myalgias.   Skin: Negative for rash and wound.   Allergic/Immunologic: Negative for environmental allergies, food allergies and immunocompromised state.   Neurological: Positive for weakness (imprving). Negative for dizziness, tremors, seizures, syncope, light-headedness, numbness and headaches.   Hematological: Does not bruise/bleed easily.   Psychiatric/Behavioral: Negative for behavioral problems, self-injury, sleep disturbance and suicidal ideas. The patient is not nervous/anxious.          Objective   /60   Pulse 64   Temp 98.6 °F (37 °C) (Oral)   Resp 16   Ht 152.4 cm (60\")   Wt 79.4 kg (175 lb)   SpO2 98%   Breastfeeding No   BMI 34.18 kg/m²   Physical Exam   Constitutional: She is oriented to person, place, and time. She appears well-developed and well-nourished. She is cooperative. No distress.   Cardiovascular: Normal rate, regular rhythm, normal heart sounds and intact distal pulses. Exam reveals no gallop and no friction rub.   No murmur heard.  Pulmonary/Chest: Effort normal and breath sounds normal. No respiratory distress. She has no wheezes. She has no rales.   Abdominal: Soft. Normal appearance and bowel sounds are normal. She exhibits no shifting dullness, no distension, no pulsatile liver, no fluid wave, no abdominal bruit, no ascites, no pulsatile midline mass and no mass. There is no hepatosplenomegaly. There is generalized tenderness (mild). There is no rigidity, no rebound, no guarding and no CVA tenderness. No hernia.   Neurological: She is alert and oriented to person, place, and time. She is not " disoriented. Coordination and gait normal.   Skin: Skin is warm, dry and intact. Capillary refill takes less than 2 seconds. No rash noted. She is not diaphoretic. No pallor.   Psychiatric: She has a normal mood and affect. Her speech is normal and behavior is normal. She is not actively hallucinating. She is attentive.   Nursing note and vitals reviewed.       PHQ-2/PHQ-9 Depression Screening 1/22/2019   Little interest or pleasure in doing things 0   Feeling down, depressed, or hopeless 0   Trouble falling or staying asleep, or sleeping too much 0   Feeling tired or having little energy 0   Poor appetite or overeating 0   Feeling bad about yourself - or that you are a failure or have let yourself or your family down 0   Trouble concentrating on things, such as reading the newspaper or watching television 0   Moving or speaking so slowly that other people could have noticed. Or the opposite - being so fidgety or restless that you have been moving around a lot more than usual 0   Thoughts that you would be better off dead, or of hurting yourself in some way 0   Total Score 0   If you checked off any problems, how difficult have these problems made it for you to do your work, take care of things at home, or get along with other people? -         Assessment/Plan   Andria was seen today for hypertension.    Diagnoses and all orders for this visit:    Chronic diarrhea  -     diphenoxylate-atropine (LOMOTIL) 2.5-0.025 MG per tablet; Take 1 tablet by mouth 4 (Four) Times a Day As Needed for Diarrhea.    Postcholecystectomy diarrhea    Clostridium difficile diarrhea- h/o    History of colon polyps    Dehydration    Hyperkalemia    Essential hypertension            Chronic diarrhea with acute exacerbation-chronic, improving with OTC Imodium, bentyl prn, flagyl treatment and zofran prn.  -12/6/19: Patient had gallbladder removed a long time ago, she has had diarrhea off and on for years most likely right after she had her  gallbladder removed.  Patient states that she had a history of C. difficile 2 to 3 years ago and it seems like this is what she normally experiences with it.  Patient states Monday was the worst she had bowel movements 8-10 times a day felt like her stomach was on fire and felt very nauseous.  Patient states that she has some incontinence at times as well where she cannot make it to the bathroom in time.  Patient unsure about blood in the stool.  Patient is also complaining of fatigue and leg cramps worsening at bedtime along with some fever and chills.  Patient denies any vomiting.  Patient denies any blood or mucus in the stool.  Patient states she eats 1 time a day usually, usually does not snack throughout the day and only drinks water for the most part.  States that eating does make this worse.  Last colonoscopy was in 2016, she said that they were unable to get completely through and view everything because she had a case of C. difficile.  Patient states that she has previously tried some type of stuff she drinks and it was nasty and did not help. Plan  of care: Order GI panel to rule out C. difficile and other bacterial/parasites.  Ordered CMP and CBC and magnesium as well as sed rate to look for anemias, electrolytes deficiencies, and inflammation.  Will call patient with results.  Also ordered  H. pylori testing to rule out ulcer.  Gave patient samples of Culturelle to take, will treat as if this is C. difficile with Flagyl 500 mg twice daily x7 days, added Bentyl for the diarrhea, and Zofran as needed for nausea.  Follow-up pending lab results.  -12/6/19 labs showed hpylori neg, GI panel neg, cbc wnl and cmp shows elevated K and elevated Cr.   -12/23/19: pt states dicyclomine 10mg, 2 capsules doesn't work. Pt states this started with GB was removed. Pt gets up around 8am and eats dinner at 5pm and then goes to bed about 730pm. Usually only eats one meal a day. Has failed rifaximin and questran bid and it  has not worked. Hx of colonoscopy two years ago, told to f/u in five years. States diarrhea after she eats or drinks anything.   -POC: will try lomotil prn as well. F/u if not improving. Recheck labs in two weeks.    Hyperkalemia/dehydration-acute, new problem, improving without K and lisinopril.   -on 12/6, K was 5.2 and CR was 1.18 and BUN 28. Told pt via phone do discontinue her potassium, hold lisinopril-hctz, take 1/2 of maxzide.  -on 12/9 K and CR back to normal, changed pt to entire maxzide, hctz added.   -POC: continue current plan of care and continue to monitor.     HTN-chronic, controlled currently with hctz 12.5mg, maxzide 75-50mg daily. HOLDING lisinopril hctz.   -pt called in to tell us /86, 141/70, and 123/78.   -POC: continue current POC.      Recheck labs in two weeks. F/u pending labs.       Patient educated to follow-up sooner than next scheduled appointment if condition(s) worse or do not improve. Patient states understanding and is in agreeance with plan of care. An After Visit Summary was printed and given to the patient.      SHAYE Reyes        This document has been electronically signed by SHAYE Reyes on January 12, 2020 11:54 PM      EMR/Transcription Dragon Disclaimer:  Some of this note may be an electronic dragon transcription/translation of spoken language to printed text. The electronic translation of spoken language may permit erroneous, or at times, nonsensical words or phrases to be inadvertently transcribed. Although I have reviewed the note for such errors, some may still exist.

## 2020-02-10 ENCOUNTER — TELEPHONE (OUTPATIENT)
Dept: FAMILY MEDICINE CLINIC | Facility: CLINIC | Age: 65
End: 2020-02-10

## 2020-02-10 RX ORDER — DICYCLOMINE HCL 20 MG
20 TABLET ORAL 3 TIMES DAILY PRN
Qty: 90 TABLET | Refills: 5 | Status: SHIPPED | OUTPATIENT
Start: 2020-02-10

## 2020-02-12 RX ORDER — TRIAMTERENE AND HYDROCHLOROTHIAZIDE 75; 50 MG/1; MG/1
TABLET ORAL
Qty: 30 TABLET | Refills: 5 | Status: SHIPPED | OUTPATIENT
Start: 2020-02-12 | End: 2020-02-24 | Stop reason: ALTCHOICE

## 2020-02-12 RX ORDER — CARVEDILOL 6.25 MG/1
6.25 TABLET ORAL 2 TIMES DAILY WITH MEALS
Qty: 60 TABLET | Refills: 11 | Status: SHIPPED | OUTPATIENT
Start: 2020-02-12 | End: 2021-02-19

## 2020-02-17 ENCOUNTER — LAB (OUTPATIENT)
Dept: LAB | Facility: OTHER | Age: 65
End: 2020-02-17

## 2020-02-17 DIAGNOSIS — E78.2 MIXED HYPERLIPIDEMIA: Chronic | ICD-10-CM

## 2020-02-17 DIAGNOSIS — D53.1 MEGALOBLASTIC ANEMIA DUE TO VITAMIN B12 DEFICIENCY: Chronic | ICD-10-CM

## 2020-02-17 DIAGNOSIS — I10 ESSENTIAL HYPERTENSION: Chronic | ICD-10-CM

## 2020-02-17 DIAGNOSIS — R73.01 IMPAIRED FASTING GLUCOSE: Chronic | ICD-10-CM

## 2020-02-17 LAB
ALBUMIN SERPL-MCNC: 3.9 G/DL (ref 3.5–5)
ALBUMIN/GLOB SERPL: 1.3 G/DL (ref 1.1–1.8)
ALP SERPL-CCNC: 103 U/L (ref 38–126)
ALT SERPL W P-5'-P-CCNC: 12 U/L
ANION GAP SERPL CALCULATED.3IONS-SCNC: 2 MMOL/L (ref 5–15)
AST SERPL-CCNC: 18 U/L (ref 14–36)
BASOPHILS # BLD AUTO: 0.04 10*3/MM3 (ref 0–0.2)
BASOPHILS NFR BLD AUTO: 0.5 % (ref 0–1.5)
BILIRUB SERPL-MCNC: 0.2 MG/DL (ref 0.2–1.3)
BUN BLD-MCNC: 15 MG/DL (ref 7–23)
BUN/CREAT SERPL: 16.1 (ref 7–25)
CALCIUM SPEC-SCNC: 9.6 MG/DL (ref 8.4–10.2)
CHLORIDE SERPL-SCNC: 105 MMOL/L (ref 101–112)
CHOLEST SERPL-MCNC: 229 MG/DL (ref 150–200)
CO2 SERPL-SCNC: 36 MMOL/L (ref 22–30)
CREAT BLD-MCNC: 0.93 MG/DL (ref 0.52–1.04)
DEPRECATED RDW RBC AUTO: 42.7 FL (ref 37–54)
EOSINOPHIL # BLD AUTO: 0.26 10*3/MM3 (ref 0–0.4)
EOSINOPHIL NFR BLD AUTO: 3.4 % (ref 0.3–6.2)
ERYTHROCYTE [DISTWIDTH] IN BLOOD BY AUTOMATED COUNT: 13.3 % (ref 12.3–15.4)
GFR SERPL CREATININE-BSD FRML MDRD: 61 ML/MIN/1.73 (ref 45–104)
GLOBULIN UR ELPH-MCNC: 3.1 GM/DL (ref 2.3–3.5)
GLUCOSE BLD-MCNC: 100 MG/DL (ref 70–99)
HCT VFR BLD AUTO: 38.9 % (ref 34–46.6)
HDLC SERPL-MCNC: 52 MG/DL (ref 40–59)
HGB BLD-MCNC: 12.3 G/DL (ref 12–15.9)
LDLC SERPL CALC-MCNC: 120 MG/DL
LDLC/HDLC SERPL: 2.31 {RATIO} (ref 0–3.22)
LYMPHOCYTES # BLD AUTO: 1.36 10*3/MM3 (ref 0.7–3.1)
LYMPHOCYTES NFR BLD AUTO: 17.9 % (ref 19.6–45.3)
MCH RBC QN AUTO: 28.9 PG (ref 26.6–33)
MCHC RBC AUTO-ENTMCNC: 31.6 G/DL (ref 31.5–35.7)
MCV RBC AUTO: 91.3 FL (ref 79–97)
MONOCYTES # BLD AUTO: 0.52 10*3/MM3 (ref 0.1–0.9)
MONOCYTES NFR BLD AUTO: 6.8 % (ref 5–12)
NEUTROPHILS # BLD AUTO: 5.42 10*3/MM3 (ref 1.7–7)
NEUTROPHILS NFR BLD AUTO: 71.4 % (ref 42.7–76)
PLATELET # BLD AUTO: 222 10*3/MM3 (ref 140–450)
PMV BLD AUTO: 11.7 FL (ref 6–12)
POTASSIUM BLD-SCNC: 3.9 MMOL/L (ref 3.4–5)
PROT SERPL-MCNC: 7 G/DL (ref 6.3–8.6)
RBC # BLD AUTO: 4.26 10*6/MM3 (ref 3.77–5.28)
SODIUM BLD-SCNC: 143 MMOL/L (ref 137–145)
TRIGL SERPL-MCNC: 284 MG/DL
VLDLC SERPL-MCNC: 56.8 MG/DL
WBC NRBC COR # BLD: 7.6 10*3/MM3 (ref 3.4–10.8)

## 2020-02-17 PROCEDURE — 83036 HEMOGLOBIN GLYCOSYLATED A1C: CPT | Performed by: INTERNAL MEDICINE

## 2020-02-17 PROCEDURE — 80061 LIPID PANEL: CPT | Performed by: INTERNAL MEDICINE

## 2020-02-17 PROCEDURE — 85025 COMPLETE CBC W/AUTO DIFF WBC: CPT | Performed by: INTERNAL MEDICINE

## 2020-02-17 PROCEDURE — 80053 COMPREHEN METABOLIC PANEL: CPT | Performed by: INTERNAL MEDICINE

## 2020-02-17 PROCEDURE — 36415 COLL VENOUS BLD VENIPUNCTURE: CPT | Performed by: INTERNAL MEDICINE

## 2020-02-17 PROCEDURE — 82607 VITAMIN B-12: CPT | Performed by: INTERNAL MEDICINE

## 2020-02-18 LAB
HBA1C MFR BLD: 5.7 % (ref 4.8–5.6)
VIT B12 BLD-MCNC: 1310 PG/ML (ref 211–946)

## 2020-02-24 ENCOUNTER — TELEPHONE (OUTPATIENT)
Dept: FAMILY MEDICINE CLINIC | Facility: CLINIC | Age: 65
End: 2020-02-24

## 2020-02-24 ENCOUNTER — OFFICE VISIT (OUTPATIENT)
Dept: FAMILY MEDICINE CLINIC | Facility: CLINIC | Age: 65
End: 2020-02-24

## 2020-02-24 VITALS
SYSTOLIC BLOOD PRESSURE: 118 MMHG | DIASTOLIC BLOOD PRESSURE: 80 MMHG | BODY MASS INDEX: 35.34 KG/M2 | TEMPERATURE: 98.6 F | HEART RATE: 60 BPM | WEIGHT: 180 LBS | HEIGHT: 60 IN

## 2020-02-24 DIAGNOSIS — E78.2 MIXED HYPERLIPIDEMIA: Chronic | ICD-10-CM

## 2020-02-24 DIAGNOSIS — F33.41 RECURRENT MAJOR DEPRESSIVE DISORDER, IN PARTIAL REMISSION (HCC): Chronic | ICD-10-CM

## 2020-02-24 DIAGNOSIS — K21.9 GASTROESOPHAGEAL REFLUX DISEASE WITHOUT ESOPHAGITIS: Chronic | ICD-10-CM

## 2020-02-24 DIAGNOSIS — G89.29 CHRONIC PAIN OF RIGHT KNEE: ICD-10-CM

## 2020-02-24 DIAGNOSIS — M25.561 CHRONIC PAIN OF RIGHT KNEE: ICD-10-CM

## 2020-02-24 DIAGNOSIS — Z91.89 SEDENTARY LIFESTYLE: ICD-10-CM

## 2020-02-24 DIAGNOSIS — R19.7 POSTCHOLECYSTECTOMY DIARRHEA: Chronic | ICD-10-CM

## 2020-02-24 DIAGNOSIS — D53.1 MEGALOBLASTIC ANEMIA DUE TO VITAMIN B12 DEFICIENCY: Chronic | ICD-10-CM

## 2020-02-24 DIAGNOSIS — K91.89 POSTCHOLECYSTECTOMY DIARRHEA: Chronic | ICD-10-CM

## 2020-02-24 DIAGNOSIS — F02.80 EARLY ONSET ALZHEIMER'S DEMENTIA WITHOUT BEHAVIORAL DISTURBANCE (HCC): Chronic | ICD-10-CM

## 2020-02-24 DIAGNOSIS — I10 ESSENTIAL HYPERTENSION: Chronic | ICD-10-CM

## 2020-02-24 DIAGNOSIS — I67.9 CEREBROVASCULAR DISEASE: Chronic | ICD-10-CM

## 2020-02-24 DIAGNOSIS — R73.01 IMPAIRED FASTING GLUCOSE: Chronic | ICD-10-CM

## 2020-02-24 DIAGNOSIS — F41.1 GENERALIZED ANXIETY DISORDER: Chronic | ICD-10-CM

## 2020-02-24 DIAGNOSIS — G30.0 EARLY ONSET ALZHEIMER'S DEMENTIA WITHOUT BEHAVIORAL DISTURBANCE (HCC): Chronic | ICD-10-CM

## 2020-02-24 DIAGNOSIS — Z00.00 MEDICARE ANNUAL WELLNESS VISIT, SUBSEQUENT: Primary | ICD-10-CM

## 2020-02-24 DIAGNOSIS — E66.01 CLASS 2 SEVERE OBESITY DUE TO EXCESS CALORIES WITH SERIOUS COMORBIDITY AND BODY MASS INDEX (BMI) OF 35.0 TO 35.9 IN ADULT (HCC): Chronic | ICD-10-CM

## 2020-02-24 PROCEDURE — 99214 OFFICE O/P EST MOD 30 MIN: CPT | Performed by: INTERNAL MEDICINE

## 2020-02-24 PROCEDURE — G0439 PPPS, SUBSEQ VISIT: HCPCS | Performed by: INTERNAL MEDICINE

## 2020-02-24 RX ORDER — LISINOPRIL AND HYDROCHLOROTHIAZIDE 20; 12.5 MG/1; MG/1
1 TABLET ORAL EVERY MORNING
Qty: 30 TABLET | Refills: 11 | Status: SHIPPED | OUTPATIENT
Start: 2020-02-24 | End: 2020-07-13

## 2020-02-24 RX ORDER — HYDROCHLOROTHIAZIDE 12.5 MG/1
12.5 TABLET ORAL DAILY
Qty: 30 TABLET | Refills: 5 | Status: SHIPPED | OUTPATIENT
Start: 2020-02-24 | End: 2020-02-24 | Stop reason: ALTCHOICE

## 2020-02-24 NOTE — TELEPHONE ENCOUNTER
I called patient and spoke to her and her . I instructed to stop Maxide and HCTZ. Dr. Mckeon wants her to restart Lisino/hctz and monitor b/p. They voiced understanding. TP

## 2020-02-24 NOTE — PROGRESS NOTES
Subjective        History of Present Illness     Andria Goldstein is a 64 y.o. female who comes in for 6-month follow up on hyperlipidemia, hypertension, cerebrovascular disease, apparent Alzheimer's dementia, impaired fasting glucose and vitamin B-12 deficiency among other issues.  She continues on Aricept and Namenda for Alzheimer's dementia and continues on  Effexor XR for anxiety and depression. Patient and her  both feel dementia symptoms have been stable over the past six months.  GERD symptoms adequately managed with Prilosec.  She continues to struggle with chronic diarrhea, although, reasonably managed with Imodium and Bentyl.       DEXA 09/2016 revealed normal bone density.    There is a new problem involving confusion about her current medications.  Andreina Calderon recently adjusted some of her blood pressure medications to address mild hyperkalemia in the setting of some volume depletion and intensified diarrhea.  We will attempt to clarify what the patient is to be taking.  The patient and her  are not sure if she is still taking any lisinopril HCT or not.  She also takes some Maxide due to past problems with lower extremity edema.    She is reporting a new problem of right knee pain.  She struggles with rather chronic right knee pain, for which she plans to have knee replacement surgery, but wants to wait until after summer months.  I encouraged her not to wait too long, as the pain is already making it difficult to ambulate.  She had successful left knee replacement surgery in the past.         She continues on Neurontin for chronic low back pain with bilateral sciatica.  She denies significant side effects including excessive daytime sedation causing impairment in operating a motor vehicle.       Cholesterol is significant higher with this set of labs.  Patient and her  report compliance with the Lipitor.      She had Prevnar 08/2019.  She will be due Pneumovax 08/2020.  She  "declines influenza vaccine.       Weight is down 3 pounds in the past six months.  Blood pressure at goal.       The patient's relevant past medical, surgical, and social history was reviewed in Epic.   Lab results are reviewed with the patient today.  CBC unremarkable. Fasting glucose 100.  A1c 5.7.  Total cholesterol 229.  HDL 52.  .  Triglycerides 284. LDL/HDL ratio 2.31.  Normal renal and liver function.  Vitamin B-12 at goal.      Review of Systems   Constitutional: Negative for chills, fatigue and fever.   HENT: Negative for congestion, ear pain, postnasal drip, sinus pressure and sore throat.    Respiratory: Negative for cough, shortness of breath and wheezing.    Cardiovascular: Negative for chest pain, palpitations and leg swelling.   Gastrointestinal: Negative for abdominal pain, blood in stool, constipation, diarrhea, nausea and vomiting.   Endocrine: Negative for cold intolerance, heat intolerance, polydipsia and polyuria.   Genitourinary: Negative for dysuria, frequency, hematuria and urgency.   Skin: Negative for rash.   Neurological: Negative for syncope and weakness.        Objective     Visit Vitals  /80   Pulse 60   Temp 98.6 °F (37 °C) (Oral)   Ht 152.4 cm (60\")   Wt 81.6 kg (180 lb)   Breastfeeding No   BMI 35.15 kg/m²     Physical Exam   Constitutional: She is oriented to person, place, and time. She appears well-developed and well-nourished. No distress.   Obese female.  Accompanied by her .     HENT:   Head: Normocephalic and atraumatic.   Nose: Right sinus exhibits no maxillary sinus tenderness and no frontal sinus tenderness. Left sinus exhibits no maxillary sinus tenderness and no frontal sinus tenderness.   Mouth/Throat: Uvula is midline, oropharynx is clear and moist and mucous membranes are normal. No oral lesions. No tonsillar exudate.   Eyes: Pupils are equal, round, and reactive to light. Conjunctivae and EOM are normal.   Neck: Trachea normal. Neck supple. No JVD " present. Carotid bruit is not present. No tracheal deviation present. No thyroid mass and no thyromegaly present.   Cardiovascular: Normal rate, regular rhythm, normal heart sounds and intact distal pulses.  No extrasystoles are present. PMI is not displaced.   No murmur heard.  Pulmonary/Chest: Effort normal and breath sounds normal. No accessory muscle usage. No respiratory distress. She has no decreased breath sounds. She has no wheezes. She has no rhonchi. She has no rales.   Abdominal: Soft. Bowel sounds are normal. She exhibits no distension. There is no hepatosplenomegaly. There is no tenderness.     Vascular Status -  Her right foot exhibits abnormal foot vasculature  (Pulses 2+ bilateral lower extremities.  ). Her right foot exhibits no edema. Her left foot exhibits abnormal foot vasculature . Her left foot exhibits no edema.  Lymphadenopathy:     She has no cervical adenopathy.   Neurological: She is alert and oriented to person, place, and time. No cranial nerve deficit. Coordination normal.   Skin: Skin is warm, dry and intact. No rash noted. No cyanosis. Nails show no clubbing.   Psychiatric: She has a normal mood and affect. Her speech is normal and behavior is normal. Judgment and thought content normal.   Vitals reviewed.      Assessment/Plan      The confusion about her medications is a new problem.  I want to see if the patient can be controlled with lisinopril HCT, Norvasc, and Coreg.  Discontinue any orders for HCTZ and Maxide.  Monitor blood pressure at home and continue to pursue sodium restriction and weight loss.  Notify me if blood pressure and heart rate are now at goal.    The complaint of right knee pain is a new problem and related to rather advanced osteoarthritis of the right knee.  She is hoping to wait to address this until next winter.  I have suggested that she address it earlier if it is limiting her physical activity.    Annual subsequent Medicare Wellness exam completed today.       Continue the Aricept and Namenda for Alzheimer's dementia and Effexor XR for anxiety.     Continue with the Lipitor 10 mg daily.  I asked them to make sure she is compliant.  Pursue dietary efforts.     Continue omeprazole for GERD.     Continue the current blood pressure medications.  Pursue sodium restriction and weight loss.    She declines influenza vaccine. She will be due Pneumovax 08/2020.       Continue other medications and vitamin and mineral supplements to treat additional medical problems which we addressed today.  Return in six months for follow up with fasting labs one week prior.      Scribed for Dr. Mckeon by Cesilia Gutierrez [facility].     Diagnoses and all orders for this visit:    Medicare annual wellness visit, subsequent    Essential hypertension  -     CBC Auto Differential; Future  -     Comprehensive Metabolic Panel; Future    Mixed hyperlipidemia  -     LDL Cholesterol, Direct; Future  -     TSH; Future  -     Triglycerides; Future    Cerebrovascular disease    Gastroesophageal reflux disease without esophagitis    Class 2 severe obesity due to excess calories with serious comorbidity and body mass index (BMI) of 35.0 to 35.9 in adult (CMS/[facility])    Postcholecystectomy diarrhea    Impaired fasting glucose  -     Hemoglobin A1c; Future    Early onset Alzheimer's dementia without behavioral disturbance (CMS/[facility])    Megaloblastic anemia due to vitamin B12 deficiency  -     Vitamin B12; Future    Generalized anxiety disorder    Recurrent major depressive disorder, in partial remission (CMS/[facility])    Chronic pain of right knee    Sedentary lifestyle        Lab on 02/17/2020   Component Date Value Ref Range Status   • WBC 02/17/2020 7.60  3.40 - 10.80 10*3/mm3 Final   • RBC 02/17/2020 4.26  3.77 - 5.28 10*6/mm3 Final   • Hemoglobin 02/17/2020 12.3  12.0 - 15.9 g/dL Final   • Hematocrit 02/17/2020 38.9  34.0 - 46.6 % Final   • MCV 02/17/2020 91.3  79.0 - 97.0 fL Final   • MCH 02/17/2020 28.9  26.6 -  33.0 pg Final   • MCHC 02/17/2020 31.6  31.5 - 35.7 g/dL Final   • RDW 02/17/2020 13.3  12.3 - 15.4 % Final   • RDW-SD 02/17/2020 42.7  37.0 - 54.0 fl Final   • MPV 02/17/2020 11.7  6.0 - 12.0 fL Final   • Platelets 02/17/2020 222  140 - 450 10*3/mm3 Final   • Neutrophil % 02/17/2020 71.4  42.7 - 76.0 % Final   • Lymphocyte % 02/17/2020 17.9* 19.6 - 45.3 % Final   • Monocyte % 02/17/2020 6.8  5.0 - 12.0 % Final   • Eosinophil % 02/17/2020 3.4  0.3 - 6.2 % Final   • Basophil % 02/17/2020 0.5  0.0 - 1.5 % Final   • Neutrophils, Absolute 02/17/2020 5.42  1.70 - 7.00 10*3/mm3 Final   • Lymphocytes, Absolute 02/17/2020 1.36  0.70 - 3.10 10*3/mm3 Final   • Monocytes, Absolute 02/17/2020 0.52  0.10 - 0.90 10*3/mm3 Final   • Eosinophils, Absolute 02/17/2020 0.26  0.00 - 0.40 10*3/mm3 Final   • Basophils, Absolute 02/17/2020 0.04  0.00 - 0.20 10*3/mm3 Final   • Glucose 02/17/2020 100* 70 - 99 mg/dL Final   • BUN 02/17/2020 15  7 - 23 mg/dL Final   • Creatinine 02/17/2020 0.93  0.52 - 1.04 mg/dL Final   • Sodium 02/17/2020 143  137 - 145 mmol/L Final   • Potassium 02/17/2020 3.9  3.4 - 5.0 mmol/L Final   • Chloride 02/17/2020 105  101 - 112 mmol/L Final   • CO2 02/17/2020 36.0* 22.0 - 30.0 mmol/L Final   • Calcium 02/17/2020 9.6  8.4 - 10.2 mg/dL Final   • Total Protein 02/17/2020 7.0  6.3 - 8.6 g/dL Final   • Albumin 02/17/2020 3.90  3.50 - 5.00 g/dL Final   • ALT (SGPT) 02/17/2020 12  <=35 U/L Final   • AST (SGOT) 02/17/2020 18  14 - 36 U/L Final   • Alkaline Phosphatase 02/17/2020 103  38 - 126 U/L Final   • Total Bilirubin 02/17/2020 0.2  0.2 - 1.3 mg/dL Final   • eGFR Non African Amer 02/17/2020 61  45 - 104 mL/min/1.73 Final   • Globulin 02/17/2020 3.1  2.3 - 3.5 gm/dL Final   • A/G Ratio 02/17/2020 1.3  1.1 - 1.8 g/dL Final   • BUN/Creatinine Ratio 02/17/2020 16.1  7.0 - 25.0 Final   • Anion Gap 02/17/2020 2.0* 5.0 - 15.0 mmol/L Final   • Hemoglobin A1C 02/17/2020 5.70* 4.80 - 5.60 % Final   • Total Cholesterol  02/17/2020 229* 150 - 200 mg/dL Final   • Triglycerides 02/17/2020 284* <=150 mg/dL Final   • HDL Cholesterol 02/17/2020 52  40 - 59 mg/dL Final   • LDL Cholesterol  02/17/2020 120* <=100 mg/dL Final   • VLDL Cholesterol 02/17/2020 56.8  mg/dL Final   • LDL/HDL Ratio 02/17/2020 2.31  0.00 - 3.22 Final   • Vitamin B-12 02/17/2020 1,310* 211 - 946 pg/mL Final   ]

## 2020-02-24 NOTE — PATIENT INSTRUCTIONS
Medicare Wellness  Personal Prevention Plan of Service     Date of Office Visit:  2020  Encounter Provider:  Christofer Mckeon MD  Place of Service:  [facility]  Patient Name: Andria Goldstein  :  1955    As part of the Medicare Wellness portion of your visit today, we are providing you with this personalized preventive plan of services (PPPS). This plan is based upon recommendations of the [place] Preventive Services Task Force (USPSTF) and the Advisory Committee on Immunization Practices (ACIP).    This lists the preventive care services that should be considered, and provides dates of when you are due. Items listed as completed are up-to-date and do not require any further intervention.    Health Maintenance   Topic Date Due   • ZOSTER VACCINE (1 of 2) 2004   • HEPATITIS C SCREENING  2016   • PAP SMEAR  2016   • DXA SCAN  2018   • Pneumococcal Vaccine Once at 65 Years Old  2019   • INFLUENZA VACCINE  2019   • MEDICARE ANNUAL WELLNESS  2020   • LIPID PANEL  2021   • MAMMOGRAM  2021   • COLONOSCOPY  2026   • TDAP/TD VACCINES (2 - Tdap) 2026       No orders of the defined types were placed in this encounter.      Return in about 6 months (around 2020) for Next scheduled follow up.            Exercising to Lose Weight  Exercise is structured, repetitive physical activity to improve fitness and health. Getting regular exercise is important for everyone. It is especially important if you are overweight. Being overweight increases your risk of heart disease, stroke, diabetes, high blood pressure, and several types of cancer. Reducing your calorie intake and exercising can help you lose weight.  Exercise is usually categorized as moderate or vigorous intensity. To lose weight, most people need to do a certain amount of moderate-intensity or vigorous-intensity exercise each  week.  Moderate-intensity exercise    Moderate-intensity exercise is any activity that gets you moving enough to burn at least three times more energy (calories) than if you were sitting.  Examples of moderate exercise include:  · Walking a mile in 15 minutes.  · Doing light yard work.  · Biking at an easy pace.  Most people should get at least 150 minutes (2 hours and 30 minutes) a week of moderate-intensity exercise to maintain their body weight.  Vigorous-intensity exercise  Vigorous-intensity exercise is any activity that gets you moving enough to burn at least six times more calories than if you were sitting. When you exercise at this intensity, you should be working hard enough that you are not able to carry on a conversation.  Examples of vigorous exercise include:  · Running.  · Playing a team sport, such as football, basketball, and soccer.  · Jumping rope.  Most people should get at least 75 minutes (1 hour and 15 minutes) a week of vigorous-intensity exercise to maintain their body weight.  How can exercise affect me?  When you exercise enough to burn more calories than you eat, you lose weight. Exercise also reduces body fat and builds muscle. The more muscle you have, the more calories you burn. Exercise also:  · Improves mood.  · Reduces stress and tension.  · Improves your overall fitness, flexibility, and endurance.  · Increases bone strength.  The amount of exercise you need to lose weight depends on:  · Your age.  · The type of exercise.  · Any health conditions you have.  · Your overall physical ability.  Talk to your health care provider about how much exercise you need and what types of activities are safe for you.  What actions can I take to lose weight?  Nutrition    · Make changes to your diet as told by your health care provider or diet and nutrition specialist (dietitian). This may include:  ? Eating fewer calories.  ? Eating more protein.  ? Eating less unhealthy fats.  ? Eating a diet  that includes fresh fruits and vegetables, whole grains, low-fat dairy products, and lean protein.  ? Avoiding foods with added fat, salt, and sugar.  · Drink plenty of water while you exercise to prevent dehydration or heat stroke.  Activity  · Choose an activity that you enjoy and set realistic goals. Your health care provider can help you make an exercise plan that works for you.  · Exercise at a moderate or vigorous intensity most days of the week.  ? The intensity of exercise may vary from person to person. You can tell how intense a workout is for you by paying attention to your breathing and heartbeat. Most people will notice their breathing and heartbeat get faster with more intense exercise.  · Do resistance training twice each week, such as:  ? Push-ups.  ? Sit-ups.  ? Lifting weights.  ? Using resistance bands.  · Getting short amounts of exercise can be just as helpful as long structured periods of exercise. If you have trouble finding time to exercise, try to include exercise in your daily routine.  ? Get up, stretch, and walk around every 30 minutes throughout the day.  ? Go for a walk during your lunch break.  ? Park your car farther away from your destination.  ? If you take public transportation, get off one stop early and walk the rest of the way.  ? Make phone calls while standing up and walking around.  ? Take the stairs instead of elevators or escalators.  · Wear comfortable clothes and shoes with good support.  · Do not exercise so much that you hurt yourself, feel dizzy, or get very short of breath.  Where to find more information  · [place] Department of Health and Human Services: www.hhs.gov  · Centers for Disease Control and Prevention (CDC): www.cdc.gov  Contact a health care provider:  · Before starting a new exercise program.  · If you have questions or concerns about your weight.  · If you have a medical problem that keeps you from exercising.  Get help right away if you have any of the  following while exercising:  · Injury.  · Dizziness.  · Difficulty breathing or shortness of breath that does not go away when you stop exercising.  · Chest pain.  · Rapid heartbeat.  Summary  · Being overweight increases your risk of heart disease, stroke, diabetes, high blood pressure, and several types of cancer.  · Losing weight happens when you burn more calories than you eat.  · Reducing the amount of calories you eat in addition to getting regular moderate or vigorous exercise each week helps you lose weight.  This information is not intended to replace advice given to you by your health care provider. Make sure you discuss any questions you have with your health care provider.  Document Released: 01/20/2012 Document Revised: 12/31/2018 Document Reviewed: 12/31/2018  [employer] Patient Education © 2020 [employer]      Calorie Counting for Weight Loss  Calories are units of energy. Your body needs a certain amount of calories from food to keep you going throughout the day. When you eat more calories than your body needs, your body stores the extra calories as fat. When you eat fewer calories than your body needs, your body burns fat to get the energy it needs.  Calorie counting means keeping track of how many calories you eat and drink each day. Calorie counting can be helpful if you need to lose weight. If you make sure to eat fewer calories than your body needs, you should lose weight. Ask your health care provider what a healthy weight is for you.  For calorie counting to work, you will need to eat the right number of calories in a day in order to lose a healthy amount of weight per week. A dietitian can help you determine how many calories you need in a day and will give you suggestions on how to reach your calorie goal.  · A healthy amount of weight to lose per week is usually 1-2 lb (0.5-0.9 kg). This usually means that your daily calorie intake should be reduced by 500-750  calories.  · Eating 1,200 - 1,500 calories per day can help most women lose weight.  · Eating 1,500 - 1,800 calories per day can help most men lose weight.  What is my plan?  My goal is to have __________ calories per day.  If I have this many calories per day, I should lose around __________ pounds per week.  What do I need to know about calorie counting?  In order to meet your daily calorie goal, you will need to:  · Find out how many calories are in each food you would like to eat. Try to do this before you eat.  · Decide how much of the food you plan to eat.  · Write down what you ate and how many calories it had. Doing this is called keeping a food log.  To successfully lose weight, it is important to balance calorie counting with a healthy lifestyle that includes regular activity. Aim for 150 minutes of moderate exercise (such as walking) or 75 minutes of vigorous exercise (such as running) each week.  Where do I find calorie information?    The number of calories in a food can be found on a Nutrition Facts label. If a food does not have a Nutrition Facts label, try to look up the calories online or ask your dietitian for help.  Remember that calories are listed per serving. If you choose to have more than one serving of a food, you will have to multiply the calories per serving by the amount of servings you plan to eat. For example, the label on a package of bread might say that a serving size is 1 slice and that there are 90 calories in a serving. If you eat 1 slice, you will have eaten 90 calories. If you eat 2 slices, you will have eaten 180 calories.  How do I keep a food log?  Immediately after each meal, record the following information in your food log:  · What you ate. Don't forget to include toppings, sauces, and other extras on the food.  · How much you ate. This can be measured in cups, ounces, or number of items.  · How many calories each food and drink had.  · The total number of calories in the  "meal.  Keep your food log near you, such as in a small notebook in your pocket, or use a mobile [employer] or website. Some programs will calculate calories for you and show you how many calories you have left for the day to meet your goal.  What are some calorie counting tips?    · Use your calories on foods and drinks that will fill you up and not leave you hungry:  ? Some examples of foods that fill you up are nuts and nut butters, vegetables, lean proteins, and high-fiber foods like whole grains. High-fiber foods are foods with more than 5 g fiber per serving.  ? Drinks such as sodas, specialty coffee drinks, alcohol, and juices have a lot of calories, yet do not fill you up.  · Eat nutritious foods and avoid empty calories. Empty calories are calories you get from foods or beverages that do not have many vitamins or protein, such as candy, sweets, and soda. It is better to have a nutritious high-calorie food (such as an avocado) than a food with few nutrients (such as a bag of chips).  · Know how many calories are in the foods you eat most often. This will help you calculate calorie counts faster.  · Pay attention to calories in drinks. Low-calorie drinks include water and unsweetened drinks.  · Pay attention to nutrition labels for \"low fat\" or \"fat free\" foods. These foods sometimes have the same amount of calories or more calories than the full fat versions. They also often have added sugar, starch, or salt, to make up for flavor that was removed with the fat.  · Find a way of tracking calories that works for you. Get creative. Try different apps or programs if writing down calories does not work for you.  What are some portion control tips?  · Know how many calories are in a serving. This will help you know how many servings of a certain food you can have.  · Use a measuring cup to measure serving sizes. You could also try weighing out portions on a kitchen scale. With time, you will be able to estimate serving " sizes for some foods.  · Take some time to put servings of different foods on your favorite plates, bowls, and cups so you know what a serving looks like.  · Try not to eat straight from a bag or box. Doing this can lead to overeating. Put the amount you would like to eat in a cup or on a plate to make sure you are eating the right portion.  · Use smaller plates, glasses, and bowls to prevent overeating.  · Try not to multitask (for example, watch TV or use your computer) while eating. If it is time to eat, sit down at a table and enjoy your food. This will help you to know when you are full. It will also help you to be aware of what you are eating and how much you are eating.  What are tips for following this plan?  Reading food labels  · Check the calorie count compared to the serving size. The serving size may be smaller than what you are used to eating.  · Check the source of the calories. Make sure the food you are eating is high in vitamins and protein and low in saturated and trans fats.  Shopping  · Read nutrition labels while you shop. This will help you make healthy decisions before you decide to purchase your food.  · Make a grocery list and stick to it.  Cooking  · Try to cook your favorite foods in a healthier way. For example, try baking instead of frying.  · Use low-fat dairy products.  Meal planning  · Use more fruits and vegetables. Half of your plate should be fruits and vegetables.  · Include lean proteins like poultry and fish.  How do I count calories when eating out?  · Ask for smaller portion sizes.  · Consider sharing an entree and sides instead of getting your own entree.  · If you get your own entree, eat only half. Ask for a box at the beginning of your meal and put the rest of your entree in it so you are not tempted to eat it.  · If calories are listed on the menu, choose the lower calorie options.  · Choose dishes that include vegetables, fruits, whole grains, low-fat dairy products,  "and lean protein.  · Choose items that are boiled, broiled, grilled, or steamed. Stay away from items that are buttered, battered, fried, or served with cream sauce. Items labeled \"crispy\" are usually fried, unless stated otherwise.  · Choose water, low-fat milk, unsweetened iced tea, or other drinks without added sugar. If you want an alcoholic beverage, choose a lower calorie option such as a glass of wine or light beer.  · Ask for dressings, sauces, and syrups on the side. These are usually high in calories, so you should limit the amount you eat.  · If you want a salad, choose a garden salad and ask for grilled meats. Avoid extra toppings like saleem, cheese, or fried items. Ask for the dressing on the side, or ask for olive oil and vinegar or lemon to use as dressing.  · Estimate how many servings of a food you are given. For example, a serving of cooked rice is ½ cup or about the size of half a baseball. Knowing serving sizes will help you be aware of how much food you are eating at restaurants. The list below tells you how big or small some common portion sizes are based on everyday objects:  ? 1 oz--4 stacked dice.  ? 3 oz--1 deck of cards.  ? 1 tsp--1 die.  ? 1 Tbsp--½ a ping-pong ball.  ? 2 Tbsp--1 ping-pong ball.  ? ½ cup--½ baseball.  ? 1 cup--1 baseball.  Summary  · Calorie counting means keeping track of how many calories you eat and drink each day. If you eat fewer calories than your body needs, you should lose weight.  · A healthy amount of weight to lose per week is usually 1-2 lb (0.5-0.9 kg). This usually means reducing your daily calorie intake by 500-750 calories.  · The number of calories in a food can be found on a Nutrition Facts label. If a food does not have a Nutrition Facts label, try to look up the calories online or ask your dietitian for help.  · Use your calories on foods and drinks that will fill you up, and not on foods and drinks that will leave you hungry.  · Use smaller plates, " glasses, and bowls to prevent overeating.  This information is not intended to replace advice given to you by your health care provider. Make sure you discuss any questions you have with your health care provider.  Document Released: 12/18/2006 Document Revised: 09/06/2019 Document Reviewed: 11/17/2017  [employer] Interactive Patient Education © 2020 [employer]

## 2020-02-24 NOTE — PROGRESS NOTES
The ABCs of the Annual Wellness Visit  Subsequent Medicare Wellness Visit    Chief Complaint   Patient presents with   • Follow-up     6 months   • Medicare Wellness-subsequent   • Knee Pain     right knee and seeing orthopedic   • Medication Problem     confusion about Lisinopril. Andreina Calderon took her off of it and continued HCTZ. They werent sure if she is taking it or not       Subjective   History of Present Illness:  Andria Goldstein is a 64 y.o. female who presents for a Subsequent Medicare Wellness Visit.    HEALTH RISK ASSESSMENT    Recent Hospitalizations:  Recently treated at the following:  Other: [facility]    Current Medical Providers:  Patient Care Team:  Christofer Mckeon MD as PCP - General (Internal Medicine)    Smoking Status:  Social History     Tobacco Use   Smoking Status Never Smoker   Smokeless Tobacco Never Used   Tobacco Comment    Tobacco reviewed with patient 5/26/2016       Alcohol Consumption:  Social History     Substance and Sexual Activity   Alcohol Use No       Depression Screen:   PHQ-2/PHQ-9 Depression Screening 2/24/2020   Little interest or pleasure in doing things 0   Feeling down, depressed, or hopeless 0   Trouble falling or staying asleep, or sleeping too much -   Feeling tired or having little energy -   Poor appetite or overeating -   Feeling bad about yourself - or that you are a failure or have let yourself or your family down -   Trouble concentrating on things, such as reading the newspaper or watching television -   Moving or speaking so slowly that other people could have noticed. Or the opposite - being so fidgety or restless that you have been moving around a lot more than usual -   Thoughts that you would be better off dead, or of hurting yourself in some way -   Total Score 0   If you checked off any problems, how difficult have these problems made it for you to do your work, take care of things at home, or get along with other people? -        Fall Risk Screen:  CHRIS Fall Risk Assessment has not been completed.    Health Habits and Functional and Cognitive Screening:  Functional & Cognitive Status 2/24/2020   Do you have difficulty preparing food and eating? No   Do you have difficulty bathing yourself, getting dressed or grooming yourself? No   Do you have difficulty using the toilet? No   Do you have difficulty moving around from place to place? No   Do you have trouble with steps or getting out of a bed or a chair? Yes   Current Diet Well Balanced Diet   Dental Exam Up to date   Eye Exam Not up to date   Exercise (times per week) 5 times per week   Current Exercise Activities Include Housecleaning   Do you need help using the phone?  No   Are you deaf or do you have serious difficulty hearing?  No   Do you need help with transportation? No   Do you need help shopping? No   Do you need help preparing meals?  No   Do you need help with housework?  Yes   Do you need help with laundry? No   Do you need help taking your medications? No   Do you need help managing money? No   Do you ever drive or ride in a car without wearing a seat belt? No   Have you felt unusual stress, anger or loneliness in the last month? No   Who do you live with? Spouse   If you need help, do you have trouble finding someone available to you? No   Have you been bothered in the last four weeks by sexual problems? No   Do you have difficulty concentrating, remembering or making decisions? Yes         Does the patient have evidence of cognitive impairment? No    Asprin use counseling:Taking ASA appropriately as indicated    Age-appropriate Screening Schedule:  Refer to the list below for future screening recommendations based on patient's age, sex and/or medical conditions. Orders for these recommended tests are listed in the plan section. The patient has been provided with a written plan.    Health Maintenance   Topic Date Due   • ZOSTER VACCINE (1 of 2) 03/30/2004   • PAP  SMEAR  08/08/2016   • DXA SCAN  09/13/2018   • LIPID PANEL  02/17/2021   • MAMMOGRAM  02/22/2021   • COLONOSCOPY  06/20/2026   • TDAP/TD VACCINES (2 - Tdap) 12/02/2026   • INFLUENZA VACCINE  Addressed          The following portions of the patient's history were reviewed and updated as appropriate:   She  has a past medical history of Actinic keratosis, Cerebrovascular disease, Chronic bilateral low back pain with left-sided sciatica, Chronic headache disorder, Degeneration of cervical intervertebral disc, Degeneration of thoracic intervertebral disc, Diarrhea, Disc disorder of lumbar region, Disorder of lumbar spine, Essential hypertension, Gastritis, Gastroesophageal reflux disease, History of colon polyps, History of colonoscopy (06/20/2016), History of mammogram (01/25/2016), Hyperlipidemia, Impaired fasting glucose, Irritable bowel syndrome, Low back pain, Lumbar radiculopathy, Malaise and fatigue, Megaloblastic anemia due to vitamin B12 deficiency, Melena, Mild intermittent asthma with acute exacerbation, Mild intermittent asthma without complication, Mixed hyperlipidemia, Obesity, Osteoarthritis, Osteoarthritis of knees, bilateral, Spasm of back muscles, Umbilical hernia without obstruction or gangrene, and Vaginitis and vulvovaginitis.  She does not have any pertinent problems on file.  She  has a past surgical history that includes Appendectomy; Cholecystectomy; Lumbar spine surgery (2008); Injection of Medication (03/17/2016); Other surgical history (01/25/2016); Injection of Medication (03/17/2016); Colonoscopy (06/20/2016); Hysterectomy; and Oophorectomy.  Her family history includes Breast cancer in her cousin; Cancer in her cousin; Colon cancer in her cousin; Diabetes in her cousin; Heart disease in her cousin; Hypertension in her cousin.  She  reports that she has never smoked. She has never used smokeless tobacco. She reports that she does not drink alcohol or use drugs.  Current Outpatient  Medications   Medication Sig Dispense Refill   • albuterol sulfate HFA (PROAIR HFA) 108 (90 Base) MCG/ACT inhaler 2 puffs qid prn 8.5 g 1   • amLODIPine (NORVASC) 5 MG tablet Take 1 tablet by mouth Daily. 30 tablet 11   • aspirin 81 MG tablet Take 81 mg by mouth daily.     • atorvastatin (LIPITOR) 10 MG tablet Take 1 tablet by mouth Daily. 90 tablet 3   • carvedilol (COREG) 6.25 MG tablet TAKE 1 TABLET BY MOUTH 2 (TWO) TIMES A DAY WITH MEALS. 60 tablet 11   • dicyclomine (BENTYL) 20 MG tablet Take 1 tablet by mouth 3 (Three) Times a Day As Needed (diarrhea/cramping). CAUTION diarrhea 90 tablet 5   • diphenoxylate-atropine (LOMOTIL) 2.5-0.025 MG per tablet Take 1 tablet by mouth 4 (Four) Times a Day As Needed for Diarrhea. 30 tablet 0   • donepezil (ARICEPT) 10 MG tablet TAKE 1 TABLET BY MOUTH EVERY NIGHT. 30 tablet 11   • fluticasone (FLONASE) 50 MCG/ACT nasal spray 1 spray into the nostril(s) as directed by provider 2 (Two) Times a Day. Administer 1 spray in each nostril twice daily. 1 bottle 11   • gabapentin (NEURONTIN) 800 MG tablet 1/2 in the morning and 1/2  at noon and 1 every night at bedtime 60 tablet 5   • memantine (NAMENDA) 10 MG tablet Take 1 tablet by mouth Daily. 60 tablet 11   • omeprazole (priLOSEC) 40 MG capsule TAKE 1 CAPSULE BY MOUTH EVERY MORNING. 30 capsule 11   • ondansetron (ZOFRAN) 4 MG tablet Take 1 tablet by mouth Every 8 (Eight) Hours As Needed for Nausea or Vomiting. 30 tablet 1   • tiZANidine (ZANAFLEX) 4 MG tablet Take 4 mg by mouth At Night As Needed for Muscle Spasms (1 tablet at night as needed for muscle spasms).     • triamterene-hydrochlorothiazide (MAXZIDE) 75-50 MG per tablet TAKE 1/2 TABLETS BY MOUTH DAILY AS NEEDED (SWELLING). 30 tablet 5   • venlafaxine XR (EFFEXOR-XR) 150 MG 24 hr capsule TAKE 1 CAPSULE BY MOUTH DAILY. 90 capsule 3   • vitamin B-12 (CYANOCOBALAMIN) 500 MCG tablet Take  by mouth. Take 1 tablet every other day.       No current facility-administered  medications for this visit.      Current Outpatient Medications on File Prior to Visit   Medication Sig   • albuterol sulfate HFA (PROAIR HFA) 108 (90 Base) MCG/ACT inhaler 2 puffs qid prn   • amLODIPine (NORVASC) 5 MG tablet Take 1 tablet by mouth Daily.   • aspirin 81 MG tablet Take 81 mg by mouth daily.   • atorvastatin (LIPITOR) 10 MG tablet Take 1 tablet by mouth Daily.   • carvedilol (COREG) 6.25 MG tablet TAKE 1 TABLET BY MOUTH 2 (TWO) TIMES A DAY WITH MEALS.   • dicyclomine (BENTYL) 20 MG tablet Take 1 tablet by mouth 3 (Three) Times a Day As Needed (diarrhea/cramping). CAUTION diarrhea   • diphenoxylate-atropine (LOMOTIL) 2.5-0.025 MG per tablet Take 1 tablet by mouth 4 (Four) Times a Day As Needed for Diarrhea.   • donepezil (ARICEPT) 10 MG tablet TAKE 1 TABLET BY MOUTH EVERY NIGHT.   • fluticasone (FLONASE) 50 MCG/ACT nasal spray 1 spray into the nostril(s) as directed by provider 2 (Two) Times a Day. Administer 1 spray in each nostril twice daily.   • gabapentin (NEURONTIN) 800 MG tablet 1/2 in the morning and 1/2  at noon and 1 every night at bedtime   • memantine (NAMENDA) 10 MG tablet Take 1 tablet by mouth Daily.   • omeprazole (priLOSEC) 40 MG capsule TAKE 1 CAPSULE BY MOUTH EVERY MORNING.   • ondansetron (ZOFRAN) 4 MG tablet Take 1 tablet by mouth Every 8 (Eight) Hours As Needed for Nausea or Vomiting.   • tiZANidine (ZANAFLEX) 4 MG tablet Take 4 mg by mouth At Night As Needed for Muscle Spasms (1 tablet at night as needed for muscle spasms).   • triamterene-hydrochlorothiazide (MAXZIDE) 75-50 MG per tablet TAKE 1/2 TABLETS BY MOUTH DAILY AS NEEDED (SWELLING).   • venlafaxine XR (EFFEXOR-XR) 150 MG 24 hr capsule TAKE 1 CAPSULE BY MOUTH DAILY.   • vitamin B-12 (CYANOCOBALAMIN) 500 MCG tablet Take  by mouth. Take 1 tablet every other day.   • [DISCONTINUED] hydroCHLOROthiazide (HYDRODIURIL) 12.5 MG tablet TAKE 1 EACH BY MOUTH DAILY.     No current facility-administered medications on file prior to  visit.      She is allergic to iodinated diagnostic agents and latex..    Outpatient Medications Prior to Visit   Medication Sig Dispense Refill   • albuterol sulfate HFA (PROAIR HFA) 108 (90 Base) MCG/ACT inhaler 2 puffs qid prn 8.5 g 1   • amLODIPine (NORVASC) 5 MG tablet Take 1 tablet by mouth Daily. 30 tablet 11   • aspirin 81 MG tablet Take 81 mg by mouth daily.     • atorvastatin (LIPITOR) 10 MG tablet Take 1 tablet by mouth Daily. 90 tablet 3   • carvedilol (COREG) 6.25 MG tablet TAKE 1 TABLET BY MOUTH 2 (TWO) TIMES A DAY WITH MEALS. 60 tablet 11   • dicyclomine (BENTYL) 20 MG tablet Take 1 tablet by mouth 3 (Three) Times a Day As Needed (diarrhea/cramping). CAUTION diarrhea 90 tablet 5   • diphenoxylate-atropine (LOMOTIL) 2.5-0.025 MG per tablet Take 1 tablet by mouth 4 (Four) Times a Day As Needed for Diarrhea. 30 tablet 0   • donepezil (ARICEPT) 10 MG tablet TAKE 1 TABLET BY MOUTH EVERY NIGHT. 30 tablet 11   • fluticasone (FLONASE) 50 MCG/ACT nasal spray 1 spray into the nostril(s) as directed by provider 2 (Two) Times a Day. Administer 1 spray in each nostril twice daily. 1 bottle 11   • gabapentin (NEURONTIN) 800 MG tablet 1/2 in the morning and 1/2  at noon and 1 every night at bedtime 60 tablet 5   • memantine (NAMENDA) 10 MG tablet Take 1 tablet by mouth Daily. 60 tablet 11   • omeprazole (priLOSEC) 40 MG capsule TAKE 1 CAPSULE BY MOUTH EVERY MORNING. 30 capsule 11   • ondansetron (ZOFRAN) 4 MG tablet Take 1 tablet by mouth Every 8 (Eight) Hours As Needed for Nausea or Vomiting. 30 tablet 1   • tiZANidine (ZANAFLEX) 4 MG tablet Take 4 mg by mouth At Night As Needed for Muscle Spasms (1 tablet at night as needed for muscle spasms).     • triamterene-hydrochlorothiazide (MAXZIDE) 75-50 MG per tablet TAKE 1/2 TABLETS BY MOUTH DAILY AS NEEDED (SWELLING). 30 tablet 5   • venlafaxine XR (EFFEXOR-XR) 150 MG 24 hr capsule TAKE 1 CAPSULE BY MOUTH DAILY. 90 capsule 3   • vitamin B-12 (CYANOCOBALAMIN) 500 MCG  tablet Take  by mouth. Take 1 tablet every other day.     • hydroCHLOROthiazide (HYDRODIURIL) 12.5 MG tablet Take 1 tablet by mouth Daily. 30 tablet 5     No facility-administered medications prior to visit.        Patient Active Problem List   Diagnosis   • Vaginitis and vulvovaginitis   • Umbilical hernia without obstruction or gangrene   • Spasm of back muscles   • Osteoarthritis of knees, bilateral   • Osteoarthritis   • Obesity   • Mild intermittent asthma without complication   • Melena   • Megaloblastic anemia due to vitamin B12 deficiency   • Malaise and fatigue   • Lumbar radiculopathy   • Chronic bilateral low back pain with bilateral sciatica   • Impaired fasting glucose   • Mixed hyperlipidemia   • History of colon polyps   • Generalized anxiety disorder   • Generalized abdominal pain   • Gastroesophageal reflux disease   • Gastritis   • Essential hypertension   • Disorder of lumbar spine   • Disc disorder of lumbar region   • Diarrhea   • Degeneration of thoracic intervertebral disc   • Degeneration of cervical intervertebral disc   • Chronic headache disorder   • Cerebrovascular disease   • Candidiasis   • Actinic keratosis   • Early onset Alzheimer's dementia without behavioral disturbance (CMS/HCC)   • Clostridium difficile diarrhea- h/o   • Postcholecystectomy diarrhea   • Recurrent major depressive disorder, in partial remission (CMS/HCC)   • Irritable bowel syndrome with diarrhea   • Trigger thumb of right hand   • Dysfunction of right eustachian tube       Advanced Care Planning:  ACP discussion was declined by the patient. Patient does not have an advance directive, declines further assistance.    Review of Systems    Compared to one year ago, the patient feels her physical health is better.  Compared to one year ago, the patient feels her mental health is better.    Reviewed chart for potential of high risk medication in the elderly: yes  Reviewed chart for potential of harmful drug  "interactions in the elderly:yes    Objective         Vitals:    02/24/20 1031   BP: 118/80   Pulse: 60   Temp: 98.6 °F (37 °C)   TempSrc: Oral   Weight: 81.6 kg (180 lb)   Height: 152.4 cm (60\")   PainSc:   8   PainLoc: Knee  Comment: right       Body mass index is 35.15 kg/m².  Discussed the patient's BMI with her. The BMI is above average; BMI management plan is completed.    Physical Exam    Lab Results   Component Value Date    TRIG 284 (H) 02/17/2020    HDL 52 02/17/2020     (H) 02/17/2020    VLDL 56.8 02/17/2020    HGBA1C 5.70 (H) 02/17/2020        Assessment/Plan   Medicare Risks and Personalized Health Plan  CMS Preventative Services Quick Reference  Dementia/Memory   Obesity/Overweight     The above risks/problems have been discussed with the patient.  Pertinent information has been shared with the patient in the After Visit Summary.  Follow up plans and orders are seen below in the Assessment/Plan Section.    Diagnoses and all orders for this visit:    1. Medicare annual wellness visit, subsequent (Primary)    2. Essential hypertension  -     CBC Auto Differential; Future  -     Comprehensive Metabolic Panel; Future    3. Mixed hyperlipidemia  -     LDL Cholesterol, Direct; Future  -     TSH; Future  -     Triglycerides; Future    4. Cerebrovascular disease    5. Gastroesophageal reflux disease without esophagitis    6. Class 2 severe obesity due to excess calories with serious comorbidity and body mass index (BMI) of 35.0 to 35.9 in adult (CMS/[facility])    7. Postcholecystectomy diarrhea    8. Impaired fasting glucose  -     Hemoglobin A1c; Future    9. Early onset Alzheimer's dementia without behavioral disturbance (CMS/[facility])    10. Megaloblastic anemia due to vitamin B12 deficiency  -     Vitamin B12; Future    11. Generalized anxiety disorder    12. Recurrent major depressive disorder, in partial remission (CMS/[facility])    13. Chronic pain of right knee    14. Sedentary lifestyle      Follow Up:  Return " in about 6 months (around 8/24/2020) for Next scheduled follow up.     An After Visit Summary and PPPS were given to the patient.

## 2020-03-13 DIAGNOSIS — M50.30 DEGENERATION OF CERVICAL INTERVERTEBRAL DISC: Primary | ICD-10-CM

## 2020-03-13 RX ORDER — GABAPENTIN 800 MG/1
TABLET ORAL
Qty: 60 TABLET | Refills: 5 | OUTPATIENT
Start: 2020-03-13

## 2020-03-13 RX ORDER — GABAPENTIN 800 MG/1
TABLET ORAL
Qty: 60 TABLET | Refills: 5 | Status: SHIPPED | OUTPATIENT
Start: 2020-03-13 | End: 2020-09-11 | Stop reason: SDUPTHER

## 2020-04-08 RX ORDER — VENLAFAXINE HYDROCHLORIDE 150 MG/1
150 CAPSULE, EXTENDED RELEASE ORAL DAILY
Qty: 90 CAPSULE | Refills: 3 | Status: SHIPPED | OUTPATIENT
Start: 2020-04-08 | End: 2021-04-27 | Stop reason: SDUPTHER

## 2020-05-05 RX ORDER — OMEPRAZOLE 40 MG/1
40 CAPSULE, DELAYED RELEASE ORAL EVERY MORNING
Qty: 30 CAPSULE | Refills: 11 | Status: SHIPPED | OUTPATIENT
Start: 2020-05-05 | End: 2021-04-30

## 2020-07-02 RX ORDER — TIZANIDINE 4 MG/1
4 TABLET ORAL NIGHTLY PRN
Qty: 30 TABLET | Refills: 3 | Status: SHIPPED | OUTPATIENT
Start: 2020-07-02 | End: 2020-10-06 | Stop reason: SDUPTHER

## 2020-07-13 RX ORDER — LISINOPRIL AND HYDROCHLOROTHIAZIDE 20; 12.5 MG/1; MG/1
1 TABLET ORAL EVERY MORNING
Qty: 90 TABLET | Refills: 0 | Status: SHIPPED | OUTPATIENT
Start: 2020-07-13 | End: 2021-02-26

## 2020-08-05 ENCOUNTER — OFFICE VISIT (OUTPATIENT)
Dept: FAMILY MEDICINE CLINIC | Facility: CLINIC | Age: 65
End: 2020-08-05

## 2020-08-05 VITALS
HEIGHT: 60 IN | SYSTOLIC BLOOD PRESSURE: 124 MMHG | TEMPERATURE: 97.2 F | OXYGEN SATURATION: 96 % | DIASTOLIC BLOOD PRESSURE: 76 MMHG | WEIGHT: 186 LBS | HEART RATE: 62 BPM | BODY MASS INDEX: 36.52 KG/M2

## 2020-08-05 DIAGNOSIS — H10.32 ACUTE CONJUNCTIVITIS OF LEFT EYE, UNSPECIFIED ACUTE CONJUNCTIVITIS TYPE: Primary | ICD-10-CM

## 2020-08-05 PROCEDURE — 99212 OFFICE O/P EST SF 10 MIN: CPT | Performed by: NURSE PRACTITIONER

## 2020-08-05 NOTE — PROGRESS NOTES
Subjective   Andria Goldstein is a 65 y.o. female who presents to the office complaining of who presents to the office complaining of discoloration and swelling of her LEFT eye.  Symptoms started two days ago.  Has never experienced this in the past.  Denies fever, chills, eye drainage.  Denies possibility of foreign body in her eye.  Started using Visine OTC yesterday.  Denies pain in her eye, denies itching.  Denies vision changes.  Eye has not been matted.  Last eye exam was in 2019.      History of Present Illness     The following portions of the patient's history were reviewed and updated as appropriate: allergies, current medications, past family history, past medical history, past social history, past surgical history and problem list.    Review of Systems   Constitutional: Negative for chills, fatigue and fever.   HENT: Negative for congestion, sneezing, sore throat and trouble swallowing.    Eyes: Negative for visual disturbance.   Respiratory: Negative for cough, chest tightness, shortness of breath and wheezing.    Cardiovascular: Negative for chest pain, palpitations and leg swelling.   Gastrointestinal: Negative for abdominal pain, constipation, diarrhea, nausea and vomiting.   Genitourinary: Negative for dysuria, frequency and urgency.   Musculoskeletal: Negative for neck pain.   Skin: Negative for rash.   Neurological: Negative for dizziness, weakness and headaches.   Psychiatric/Behavioral:        In the past two weeks the pt has not felt down, depressed, hopeless or lost interest in doing things   All other systems reviewed and are negative.      Objective   Physical Exam   Constitutional: She is oriented to person, place, and time. She appears well-developed and well-nourished.  Non-toxic appearance. No distress.   HENT:   Head: Normocephalic and atraumatic.   Right Ear: External ear normal.   Left Ear: External ear normal.   Nose: Nose normal.   Mouth/Throat: Uvula is midline, oropharynx is  clear and moist and mucous membranes are normal.   Eyes: Pupils are equal, round, and reactive to light. Conjunctivae and EOM are normal. Right eye exhibits no discharge. Left eye exhibits no discharge. No scleral icterus.   LEFT conjunctiva and sclera is slightly erythemic, no exudate noted, no significant edema noted to lids   Neck: Normal range of motion. Neck supple. No thyromegaly present.   Cardiovascular: Normal rate, regular rhythm, normal heart sounds and intact distal pulses. Exam reveals no gallop and no friction rub.   No murmur heard.  Pulmonary/Chest: Effort normal and breath sounds normal. No respiratory distress. She has no wheezes. She has no rales.   Abdominal: Soft. Bowel sounds are normal. She exhibits no distension. There is no tenderness. There is no rebound and no guarding.   Musculoskeletal: Normal range of motion. She exhibits no edema.   Lymphadenopathy:     She has no cervical adenopathy.   Neurological: She is alert and oriented to person, place, and time. No cranial nerve deficit.   Skin: Skin is warm and dry. No rash noted.   Psychiatric: She has a normal mood and affect. Her behavior is normal. Judgment and thought content normal.   Nursing note and vitals reviewed.      Assessment/Plan   Andria was seen today for facial swelling.    Diagnoses and all orders for this visit:    Acute conjunctivitis of left eye, unspecified acute conjunctivitis type    Encouraged to continue with Visine prn through tomorrow as she requests.  If she wakes up on Friday with symptoms we can send in combo antibiotic/steroid opth gtts.    Good handwashing.  May use cool/warm compresses prn for itch/pain relief.

## 2020-08-20 ENCOUNTER — LAB (OUTPATIENT)
Dept: LAB | Facility: OTHER | Age: 65
End: 2020-08-20

## 2020-08-20 DIAGNOSIS — E78.2 MIXED HYPERLIPIDEMIA: Chronic | ICD-10-CM

## 2020-08-20 DIAGNOSIS — I10 ESSENTIAL HYPERTENSION: Chronic | ICD-10-CM

## 2020-08-20 DIAGNOSIS — D53.1 MEGALOBLASTIC ANEMIA DUE TO VITAMIN B12 DEFICIENCY: Chronic | ICD-10-CM

## 2020-08-20 DIAGNOSIS — R73.01 IMPAIRED FASTING GLUCOSE: Chronic | ICD-10-CM

## 2020-08-20 LAB
ALBUMIN SERPL-MCNC: 4.3 G/DL (ref 3.5–5)
ALBUMIN/GLOB SERPL: 1.4 G/DL (ref 1.1–1.8)
ALP SERPL-CCNC: 112 U/L (ref 38–126)
ALT SERPL W P-5'-P-CCNC: 21 U/L
ANION GAP SERPL CALCULATED.3IONS-SCNC: 5 MMOL/L (ref 5–15)
AST SERPL-CCNC: 30 U/L (ref 14–36)
BASOPHILS # BLD AUTO: 0.03 10*3/MM3 (ref 0–0.2)
BASOPHILS NFR BLD AUTO: 0.4 % (ref 0–1.5)
BILIRUB SERPL-MCNC: 0.6 MG/DL (ref 0.2–1.3)
BUN SERPL-MCNC: 18 MG/DL (ref 7–23)
BUN/CREAT SERPL: 18.9 (ref 7–25)
CALCIUM SPEC-SCNC: 10.2 MG/DL (ref 8.4–10.2)
CHLORIDE SERPL-SCNC: 103 MMOL/L (ref 101–112)
CO2 SERPL-SCNC: 33 MMOL/L (ref 22–30)
CREAT SERPL-MCNC: 0.95 MG/DL (ref 0.52–1.04)
DEPRECATED RDW RBC AUTO: 45.5 FL (ref 37–54)
EOSINOPHIL # BLD AUTO: 0.47 10*3/MM3 (ref 0–0.4)
EOSINOPHIL NFR BLD AUTO: 6.3 % (ref 0.3–6.2)
ERYTHROCYTE [DISTWIDTH] IN BLOOD BY AUTOMATED COUNT: 14.4 % (ref 12.3–15.4)
GFR SERPL CREATININE-BSD FRML MDRD: 59 ML/MIN/1.73 (ref 45–104)
GLOBULIN UR ELPH-MCNC: 3.1 GM/DL (ref 2.3–3.5)
GLUCOSE SERPL-MCNC: 95 MG/DL (ref 70–99)
HCT VFR BLD AUTO: 41.8 % (ref 34–46.6)
HGB BLD-MCNC: 13.5 G/DL (ref 12–15.9)
LYMPHOCYTES # BLD AUTO: 1.68 10*3/MM3 (ref 0.7–3.1)
LYMPHOCYTES NFR BLD AUTO: 22.6 % (ref 19.6–45.3)
MCH RBC QN AUTO: 28.5 PG (ref 26.6–33)
MCHC RBC AUTO-ENTMCNC: 32.3 G/DL (ref 31.5–35.7)
MCV RBC AUTO: 88.2 FL (ref 79–97)
MONOCYTES # BLD AUTO: 0.48 10*3/MM3 (ref 0.1–0.9)
MONOCYTES NFR BLD AUTO: 6.5 % (ref 5–12)
NEUTROPHILS NFR BLD AUTO: 4.77 10*3/MM3 (ref 1.7–7)
NEUTROPHILS NFR BLD AUTO: 64.2 % (ref 42.7–76)
PLATELET # BLD AUTO: 210 10*3/MM3 (ref 140–450)
PMV BLD AUTO: 12 FL (ref 6–12)
POTASSIUM SERPL-SCNC: 4.5 MMOL/L (ref 3.4–5)
PROT SERPL-MCNC: 7.4 G/DL (ref 6.3–8.6)
RBC # BLD AUTO: 4.74 10*6/MM3 (ref 3.77–5.28)
SODIUM SERPL-SCNC: 141 MMOL/L (ref 137–145)
TRIGL SERPL-MCNC: 181 MG/DL
WBC # BLD AUTO: 7.43 10*3/MM3 (ref 3.4–10.8)

## 2020-08-20 PROCEDURE — 82607 VITAMIN B-12: CPT | Performed by: INTERNAL MEDICINE

## 2020-08-20 PROCEDURE — 84478 ASSAY OF TRIGLYCERIDES: CPT | Performed by: INTERNAL MEDICINE

## 2020-08-20 PROCEDURE — 85025 COMPLETE CBC W/AUTO DIFF WBC: CPT | Performed by: INTERNAL MEDICINE

## 2020-08-20 PROCEDURE — 84443 ASSAY THYROID STIM HORMONE: CPT | Performed by: INTERNAL MEDICINE

## 2020-08-20 PROCEDURE — 80053 COMPREHEN METABOLIC PANEL: CPT | Performed by: INTERNAL MEDICINE

## 2020-08-20 PROCEDURE — 83721 ASSAY OF BLOOD LIPOPROTEIN: CPT | Performed by: INTERNAL MEDICINE

## 2020-08-20 PROCEDURE — 36415 COLL VENOUS BLD VENIPUNCTURE: CPT | Performed by: INTERNAL MEDICINE

## 2020-08-20 PROCEDURE — 83036 HEMOGLOBIN GLYCOSYLATED A1C: CPT | Performed by: INTERNAL MEDICINE

## 2020-08-21 LAB
ARTICHOKE IGE QN: 171 MG/DL (ref 0–100)
HBA1C MFR BLD: 5.8 % (ref 4.8–5.6)
TSH SERPL DL<=0.05 MIU/L-ACNC: 2.06 UIU/ML (ref 0.27–4.2)
VIT B12 BLD-MCNC: 1302 PG/ML (ref 211–946)

## 2020-08-26 ENCOUNTER — OFFICE VISIT (OUTPATIENT)
Dept: FAMILY MEDICINE CLINIC | Facility: CLINIC | Age: 65
End: 2020-08-26

## 2020-08-26 VITALS
HEART RATE: 60 BPM | HEIGHT: 60 IN | DIASTOLIC BLOOD PRESSURE: 58 MMHG | SYSTOLIC BLOOD PRESSURE: 125 MMHG | WEIGHT: 186 LBS | BODY MASS INDEX: 36.52 KG/M2

## 2020-08-26 DIAGNOSIS — Z23 NEED FOR 23-POLYVALENT PNEUMOCOCCAL POLYSACCHARIDE VACCINE: ICD-10-CM

## 2020-08-26 DIAGNOSIS — I10 ESSENTIAL HYPERTENSION: Primary | Chronic | ICD-10-CM

## 2020-08-26 DIAGNOSIS — E66.01 CLASS 2 SEVERE OBESITY DUE TO EXCESS CALORIES WITH SERIOUS COMORBIDITY AND BODY MASS INDEX (BMI) OF 36.0 TO 36.9 IN ADULT (HCC): Chronic | ICD-10-CM

## 2020-08-26 DIAGNOSIS — E78.2 MIXED HYPERLIPIDEMIA: Chronic | ICD-10-CM

## 2020-08-26 DIAGNOSIS — F33.41 RECURRENT MAJOR DEPRESSIVE DISORDER, IN PARTIAL REMISSION (HCC): Chronic | ICD-10-CM

## 2020-08-26 DIAGNOSIS — R73.01 IMPAIRED FASTING GLUCOSE: Chronic | ICD-10-CM

## 2020-08-26 DIAGNOSIS — G30.0 EARLY ONSET ALZHEIMER'S DEMENTIA WITHOUT BEHAVIORAL DISTURBANCE (HCC): Chronic | ICD-10-CM

## 2020-08-26 DIAGNOSIS — S93.402A SPRAIN OF LEFT ANKLE, UNSPECIFIED LIGAMENT, INITIAL ENCOUNTER: ICD-10-CM

## 2020-08-26 DIAGNOSIS — F02.80 EARLY ONSET ALZHEIMER'S DEMENTIA WITHOUT BEHAVIORAL DISTURBANCE (HCC): Chronic | ICD-10-CM

## 2020-08-26 DIAGNOSIS — D53.1 MEGALOBLASTIC ANEMIA DUE TO VITAMIN B12 DEFICIENCY: Chronic | ICD-10-CM

## 2020-08-26 DIAGNOSIS — Z78.0 MENOPAUSE: ICD-10-CM

## 2020-08-26 PROCEDURE — 99443 PR PHYS/QHP TELEPHONE EVALUATION 21-30 MIN: CPT | Performed by: INTERNAL MEDICINE

## 2020-08-26 RX ORDER — ATORVASTATIN CALCIUM 20 MG/1
20 TABLET, FILM COATED ORAL DAILY
Qty: 30 TABLET | Refills: 11 | Status: SHIPPED | OUTPATIENT
Start: 2020-08-26 | End: 2021-08-25

## 2020-08-26 NOTE — PATIENT INSTRUCTIONS
Exercising to Lose Weight  Exercise is structured, repetitive physical activity to improve fitness and health. Getting regular exercise is important for everyone. It is especially important if you are overweight. Being overweight increases your risk of heart disease, stroke, diabetes, high blood pressure, and several types of cancer. Reducing your calorie intake and exercising can help you lose weight.  Exercise is usually categorized as moderate or vigorous intensity. To lose weight, most people need to do a certain amount of moderate-intensity or vigorous-intensity exercise each week.  Moderate-intensity exercise    Moderate-intensity exercise is any activity that gets you moving enough to burn at least three times more energy (calories) than if you were sitting.  Examples of moderate exercise include:  · Walking a mile in 15 minutes.  · Doing light yard work.  · Biking at an easy pace.  Most people should get at least 150 minutes (2 hours and 30 minutes) a week of moderate-intensity exercise to maintain their body weight.  Vigorous-intensity exercise  Vigorous-intensity exercise is any activity that gets you moving enough to burn at least six times more calories than if you were sitting. When you exercise at this intensity, you should be working hard enough that you are not able to carry on a conversation.  Examples of vigorous exercise include:  · Running.  · Playing a team sport, such as football, basketball, and soccer.  · Jumping rope.  Most people should get at least 75 minutes (1 hour and 15 minutes) a week of vigorous-intensity exercise to maintain their body weight.  How can exercise affect me?  When you exercise enough to burn more calories than you eat, you lose weight. Exercise also reduces body fat and builds muscle. The more muscle you have, the more calories you burn. Exercise also:  · Improves mood.  · Reduces stress and tension.  · Improves your overall fitness, flexibility, and  endurance.  · Increases bone strength.  The amount of exercise you need to lose weight depends on:  · Your age.  · The type of exercise.  · Any health conditions you have.  · Your overall physical ability.  Talk to your health care provider about how much exercise you need and what types of activities are safe for you.  What actions can I take to lose weight?  Nutrition    · Make changes to your diet as told by your health care provider or diet and nutrition specialist (dietitian). This may include:  ? Eating fewer calories.  ? Eating more protein.  ? Eating less unhealthy fats.  ? Eating a diet that includes fresh fruits and vegetables, whole grains, low-fat dairy products, and lean protein.  ? Avoiding foods with added fat, salt, and sugar.  · Drink plenty of water while you exercise to prevent dehydration or heat stroke.  Activity  · Choose an activity that you enjoy and set realistic goals. Your health care provider can help you make an exercise plan that works for you.  · Exercise at a moderate or vigorous intensity most days of the week.  ? The intensity of exercise may vary from person to person. You can tell how intense a workout is for you by paying attention to your breathing and heartbeat. Most people will notice their breathing and heartbeat get faster with more intense exercise.  · Do resistance training twice each week, such as:  ? Push-ups.  ? Sit-ups.  ? Lifting weights.  ? Using resistance bands.  · Getting short amounts of exercise can be just as helpful as long structured periods of exercise. If you have trouble finding time to exercise, try to include exercise in your daily routine.  ? Get up, stretch, and walk around every 30 minutes throughout the day.  ? Go for a walk during your lunch break.  ? Park your car farther away from your destination.  ? If you take public transportation, get off one stop early and walk the rest of the way.  ? Make phone calls while standing up and walking  around.  ? Take the stairs instead of elevators or escalators.  · Wear comfortable clothes and shoes with good support.  · Do not exercise so much that you hurt yourself, feel dizzy, or get very short of breath.  Where to find more information  · [place] Department of Health and Human Services: www.hhs.gov  · Centers for Disease Control and Prevention (CDC): www.cdc.gov  Contact a health care provider:  · Before starting a new exercise program.  · If you have questions or concerns about your weight.  · If you have a medical problem that keeps you from exercising.  Get help right away if you have any of the following while exercising:  · Injury.  · Dizziness.  · Difficulty breathing or shortness of breath that does not go away when you stop exercising.  · Chest pain.  · Rapid heartbeat.  Summary  · Being overweight increases your risk of heart disease, stroke, diabetes, high blood pressure, and several types of cancer.  · Losing weight happens when you burn more calories than you eat.  · Reducing the amount of calories you eat in addition to getting regular moderate or vigorous exercise each week helps you lose weight.  This information is not intended to replace advice given to you by your health care provider. Make sure you discuss any questions you have with your health care provider.  Document Released: 01/20/2012 Document Revised: 12/31/2018 Document Reviewed: 12/31/2018  [employer] Patient Education © 2020 [employer]      Calorie Counting for Weight Loss  Calories are units of energy. Your body needs a certain amount of calories from food to keep you going throughout the day. When you eat more calories than your body needs, your body stores the extra calories as fat. When you eat fewer calories than your body needs, your body burns fat to get the energy it needs.  Calorie counting means keeping track of how many calories you eat and drink each day. Calorie counting can be helpful if you need to lose weight. If  you make sure to eat fewer calories than your body needs, you should lose weight. Ask your health care provider what a healthy weight is for you.  For calorie counting to work, you will need to eat the right number of calories in a day in order to lose a healthy amount of weight per week. A dietitian can help you determine how many calories you need in a day and will give you suggestions on how to reach your calorie goal.  · A healthy amount of weight to lose per week is usually 1-2 lb (0.5-0.9 kg). This usually means that your daily calorie intake should be reduced by 500-750 calories.  · Eating 1,200 - 1,500 calories per day can help most women lose weight.  · Eating 1,500 - 1,800 calories per day can help most men lose weight.  What is my plan?  My goal is to have __________ calories per day.  If I have this many calories per day, I should lose around __________ pounds per week.  What do I need to know about calorie counting?  In order to meet your daily calorie goal, you will need to:  · Find out how many calories are in each food you would like to eat. Try to do this before you eat.  · Decide how much of the food you plan to eat.  · Write down what you ate and how many calories it had. Doing this is called keeping a food log.  To successfully lose weight, it is important to balance calorie counting with a healthy lifestyle that includes regular activity. Aim for 150 minutes of moderate exercise (such as walking) or 75 minutes of vigorous exercise (such as running) each week.  Where do I find calorie information?    The number of calories in a food can be found on a Nutrition Facts label. If a food does not have a Nutrition Facts label, try to look up the calories online or ask your dietitian for help.  Remember that calories are listed per serving. If you choose to have more than one serving of a food, you will have to multiply the calories per serving by the amount of servings you plan to eat. For example, the  "label on a package of bread might say that a serving size is 1 slice and that there are 90 calories in a serving. If you eat 1 slice, you will have eaten 90 calories. If you eat 2 slices, you will have eaten 180 calories.  How do I keep a food log?  Immediately after each meal, record the following information in your food log:  · What you ate. Don't forget to include toppings, sauces, and other extras on the food.  · How much you ate. This can be measured in cups, ounces, or number of items.  · How many calories each food and drink had.  · The total number of calories in the meal.  Keep your food log near you, such as in a small notebook in your pocket, or use a mobile [employer]ra or website. Some programs will calculate calories for you and show you how many calories you have left for the day to meet your goal.  What are some calorie counting tips?    · Use your calories on foods and drinks that will fill you up and not leave you hungry:  ? Some examples of foods that fill you up are nuts and nut butters, vegetables, lean proteins, and high-fiber foods like whole grains. High-fiber foods are foods with more than 5 g fiber per serving.  ? Drinks such as sodas, specialty coffee drinks, alcohol, and juices have a lot of calories, yet do not fill you up.  · Eat nutritious foods and avoid empty calories. Empty calories are calories you get from foods or beverages that do not have many vitamins or protein, such as candy, sweets, and soda. It is better to have a nutritious high-calorie food (such as an avocado) than a food with few nutrients (such as a bag of chips).  · Know how many calories are in the foods you eat most often. This will help you calculate calorie counts faster.  · Pay attention to calories in drinks. Low-calorie drinks include water and unsweetened drinks.  · Pay attention to nutrition labels for \"low fat\" or \"fat free\" foods. These foods sometimes have the same amount of calories or more calories than the " full fat versions. They also often have added sugar, starch, or salt, to make up for flavor that was removed with the fat.  · Find a way of tracking calories that works for you. Get creative. Try different apps or programs if writing down calories does not work for you.  What are some portion control tips?  · Know how many calories are in a serving. This will help you know how many servings of a certain food you can have.  · Use a measuring cup to measure serving sizes. You could also try weighing out portions on a kitchen scale. With time, you will be able to estimate serving sizes for some foods.  · Take some time to put servings of different foods on your favorite plates, bowls, and cups so you know what a serving looks like.  · Try not to eat straight from a bag or box. Doing this can lead to overeating. Put the amount you would like to eat in a cup or on a plate to make sure you are eating the right portion.  · Use smaller plates, glasses, and bowls to prevent overeating.  · Try not to multitask (for example, watch TV or use your computer) while eating. If it is time to eat, sit down at a table and enjoy your food. This will help you to know when you are full. It will also help you to be aware of what you are eating and how much you are eating.  What are tips for following this plan?  Reading food labels  · Check the calorie count compared to the serving size. The serving size may be smaller than what you are used to eating.  · Check the source of the calories. Make sure the food you are eating is high in vitamins and protein and low in saturated and trans fats.  Shopping  · Read nutrition labels while you shop. This will help you make healthy decisions before you decide to purchase your food.  · Make a grocery list and stick to it.  Cooking  · Try to cook your favorite foods in a healthier way. For example, try baking instead of frying.  · Use low-fat dairy products.  Meal planning  · Use more fruits and  "vegetables. Half of your plate should be fruits and vegetables.  · Include lean proteins like poultry and fish.  How do I count calories when eating out?  · Ask for smaller portion sizes.  · Consider sharing an entree and sides instead of getting your own entree.  · If you get your own entree, eat only half. Ask for a box at the beginning of your meal and put the rest of your entree in it so you are not tempted to eat it.  · If calories are listed on the menu, choose the lower calorie options.  · Choose dishes that include vegetables, fruits, whole grains, low-fat dairy products, and lean protein.  · Choose items that are boiled, broiled, grilled, or steamed. Stay away from items that are buttered, battered, fried, or served with cream sauce. Items labeled \"crispy\" are usually fried, unless stated otherwise.  · Choose water, low-fat milk, unsweetened iced tea, or other drinks without added sugar. If you want an alcoholic beverage, choose a lower calorie option such as a glass of wine or light beer.  · Ask for dressings, sauces, and syrups on the side. These are usually high in calories, so you should limit the amount you eat.  · If you want a salad, choose a garden salad and ask for grilled meats. Avoid extra toppings like saleem, cheese, or fried items. Ask for the dressing on the side, or ask for olive oil and vinegar or lemon to use as dressing.  · Estimate how many servings of a food you are given. For example, a serving of cooked rice is ½ cup or about the size of half a baseball. Knowing serving sizes will help you be aware of how much food you are eating at restaurants. The list below tells you how big or small some common portion sizes are based on everyday objects:  ? 1 oz--4 stacked dice.  ? 3 oz--1 deck of cards.  ? 1 tsp--1 die.  ? 1 Tbsp--½ a ping-pong ball.  ? 2 Tbsp--1 ping-pong ball.  ? ½ cup--½ baseball.  ? 1 cup--1 baseball.  Summary  · Calorie counting means keeping track of how many calories you " eat and drink each day. If you eat fewer calories than your body needs, you should lose weight.  · A healthy amount of weight to lose per week is usually 1-2 lb (0.5-0.9 kg). This usually means reducing your daily calorie intake by 500-750 calories.  · The number of calories in a food can be found on a Nutrition Facts label. If a food does not have a Nutrition Facts label, try to look up the calories online or ask your dietitian for help.  · Use your calories on foods and drinks that will fill you up, and not on foods and drinks that will leave you hungry.  · Use smaller plates, glasses, and bowls to prevent overeating.  This information is not intended to replace advice given to you by your health care provider. Make sure you discuss any questions you have with your health care provider.  Document Released: 12/18/2006 Document Revised: 09/06/2019 Document Reviewed: 11/17/2017  [employer] Patient Education © 2020 [employer]

## 2020-08-26 NOTE — PROGRESS NOTES
Subjective      You have chosen to receive care through a telephone visit. Do you consent to use a telephone visit for your medical care today? Yes  Total visit time: 23 minutes.         History of Present Illness     Andria Goldstein is a 65 y.o. female who receives care today via telephone visit for overdue 6-month follow up on hyperlipidemia, hypertension, cerebrovascular disease, apparent Alzheimer's dementia, impaired fasting glucose and vitamin B-12 deficiency among other issues.  She continues on Aricept and Namenda for Alzheimer's dementia and continues on  Effexor XR for anxiety and depression.    Patient was seen in the ER at [facility] on 08/23/2020 for left ankle pain. Patient had an inversion injury a couple of days prior with increasing pain and increased difficulty walking. X-rays revealed soft tissue swelling with no evidence of fracture.  She reports the ankle pain is gradually improving and she has been able to increase weightbearing activity.       She continues on Neurontin for chronic low back pain with bilateral sciatica.  She denies significant side effects including excessive daytime sedation causing impairment in operating a motor vehicle.       She is due Pneumovax and will come in for this at her convenience.  She had Prevnar 08/2019.      DEXA 09/2016 revealed normal bone density.  We will send and order for patient to repeat DEXA.     Her LDL cholesterol is not at goal at 171 increased from 120.  She reports compliance with her Lipitor 10 mg daily.  I recommended increasing her dose of Lipitor in combination with low fat diet, exercise, and weight loss.       She reports stable body weight, although, BMI is 36.3.  Labs continue to reveal impaired fasting glucose with gradually improving fasting glucose.   I continue to recommended low carbohydrate, diabetic diet.      The patient's relevant past medical, surgical, and social history was reviewed in Epic.   Lab results are reviewed with  "the patient today.  CBC unremarkable.  Fasting glucose 95.  A1c 5.8.  Normal renal and liver function.  Triglycerides 181.  .  Thyroid at goal.      Review of Systems   Constitutional: Negative for chills, fatigue and fever.   HENT: Negative for congestion, ear pain, postnasal drip, sinus pressure and sore throat.    Respiratory: Negative for cough, shortness of breath and wheezing.    Cardiovascular: Negative for chest pain, palpitations and leg swelling.   Gastrointestinal: Negative for abdominal pain, blood in stool, constipation, diarrhea, nausea and vomiting.   Endocrine: Negative for cold intolerance, heat intolerance, polydipsia and polyuria.   Genitourinary: Negative for dysuria, frequency, hematuria and urgency.   Skin: Negative for rash.   Neurological: Negative for syncope and weakness.        Objective      Visit Vitals  /58   Pulse 60   Ht 152.4 cm (60\")   Wt 84.4 kg (186 lb)   BMI 36.33 kg/m²         Physical Exam        Assessment/Plan      We will increase her dose of Lipitor to 20 mg q.d.  There appears to be some compliance issues, which could be related to patient's dementia.  Recommended low fat, low cholesterol diet and weight loss.  Increase physical activity.    Continue the principles of a diabetic style diet and weight loss plan to slow progression of impaired fasting glucose    I sent an order for patient to schedule repeat DEXA.     Continue to avoid prolonged periods of weightbearing activity for a few more days until ankle sprain resolves.  Continue with elevation when possible.          Continue the Aricept and Namenda for Alzheimer's dementia and Effexor XR for depression and anxiety, all of which seems stable.      Continue omeprazole for GERD, which is stable.     Continue Neurontin for chronic low back pain with bilateral sciatica     Continue the current blood pressure medications.  Pursue sodium restriction and weight loss.     Recommended influenza vaccine in " September/October when available.   Patient will stop by the office for Pneumovax at her convenience, possibly when she is in the clinic for DEXA.       Continue other medications and vitamin and mineral supplements to treat additional medical problems which we addressed today.  Return in six months for follow up with fasting labs one week prior.        Scribed for Dr. Mckeon by Cesilia Gutierrez [facility].     Diagnoses and all orders for this visit:    Essential hypertension  -     CBC Auto Differential; Future  -     Comprehensive Metabolic Panel; Future    Mixed hyperlipidemia  -     Lipid Panel; Future    Impaired fasting glucose  -     Hemoglobin A1c; Future    Class 2 severe obesity due to excess calories with serious comorbidity and body mass index (BMI) of 36.0 to 36.9 in adult (CMS/HCC)    Early onset Alzheimer's dementia without behavioral disturbance (CMS/HCC)    Megaloblastic anemia due to vitamin B12 deficiency  -     Vitamin B12; Future    Recurrent major depressive disorder, in partial remission (CMS/[facility])    Menopause    Need for 23-polyvalent pneumococcal polysaccharide vaccine    Sprain of left ankle, unspecified ligament, initial encounter    Other orders  -     atorvastatin (LIPITOR) 20 MG tablet; Take 1 tablet by mouth Daily.        Lab on 08/20/2020   Component Date Value Ref Range Status   • WBC 08/20/2020 7.43  3.40 - 10.80 10*3/mm3 Final   • RBC 08/20/2020 4.74  3.77 - 5.28 10*6/mm3 Final   • Hemoglobin 08/20/2020 13.5  12.0 - 15.9 g/dL Final   • Hematocrit 08/20/2020 41.8  34.0 - 46.6 % Final   • MCV 08/20/2020 88.2  79.0 - 97.0 fL Final   • MCH 08/20/2020 28.5  26.6 - 33.0 pg Final   • MCHC 08/20/2020 32.3  31.5 - 35.7 g/dL Final   • RDW 08/20/2020 14.4  12.3 - 15.4 % Final   • RDW-SD 08/20/2020 45.5  37.0 - 54.0 fl Final   • MPV 08/20/2020 12.0  6.0 - 12.0 fL Final   • Platelets 08/20/2020 210  140 - 450 10*3/mm3 Final   • Neutrophil % 08/20/2020 64.2  42.7 - 76.0 % Final   • Lymphocyte %  08/20/2020 22.6  19.6 - 45.3 % Final   • Monocyte % 08/20/2020 6.5  5.0 - 12.0 % Final   • Eosinophil % 08/20/2020 6.3* 0.3 - 6.2 % Final   • Basophil % 08/20/2020 0.4  0.0 - 1.5 % Final   • Neutrophils, Absolute 08/20/2020 4.77  1.70 - 7.00 10*3/mm3 Final   • Lymphocytes, Absolute 08/20/2020 1.68  0.70 - 3.10 10*3/mm3 Final   • Monocytes, Absolute 08/20/2020 0.48  0.10 - 0.90 10*3/mm3 Final   • Eosinophils, Absolute 08/20/2020 0.47* 0.00 - 0.40 10*3/mm3 Final   • Basophils, Absolute 08/20/2020 0.03  0.00 - 0.20 10*3/mm3 Final   • Glucose 08/20/2020 95  70 - 99 mg/dL Final   • BUN 08/20/2020 18  7 - 23 mg/dL Final   • Creatinine 08/20/2020 0.95  0.52 - 1.04 mg/dL Final   • Sodium 08/20/2020 141  137 - 145 mmol/L Final   • Potassium 08/20/2020 4.5  3.4 - 5.0 mmol/L Final   • Chloride 08/20/2020 103  101 - 112 mmol/L Final   • CO2 08/20/2020 33.0* 22.0 - 30.0 mmol/L Final   • Calcium 08/20/2020 10.2  8.4 - 10.2 mg/dL Final   • Total Protein 08/20/2020 7.4  6.3 - 8.6 g/dL Final   • Albumin 08/20/2020 4.30  3.50 - 5.00 g/dL Final   • ALT (SGPT) 08/20/2020 21  <=35 U/L Final   • AST (SGOT) 08/20/2020 30  14 - 36 U/L Final   • Alkaline Phosphatase 08/20/2020 112  38 - 126 U/L Final   • Total Bilirubin 08/20/2020 0.6  0.2 - 1.3 mg/dL Final   • eGFR Non  Amer 08/20/2020 59  45 - 104 mL/min/1.73 Final   • Globulin 08/20/2020 3.1  2.3 - 3.5 gm/dL Final   • A/G Ratio 08/20/2020 1.4  1.1 - 1.8 g/dL Final   • BUN/Creatinine Ratio 08/20/2020 18.9  7.0 - 25.0 Final   • Anion Gap 08/20/2020 5.0  5.0 - 15.0 mmol/L Final   • Hemoglobin A1C 08/20/2020 5.80* 4.80 - 5.60 % Final   • LDL Cholesterol  08/20/2020 171* 0 - 100 mg/dL Final   • TSH 08/20/2020 2.060  0.270 - 4.200 uIU/mL Final   • Triglycerides 08/20/2020 181* <=150 mg/dL Final   • Vitamin B-12 08/20/2020 1,302* 211 - 946 pg/mL Final   ]

## 2020-09-11 DIAGNOSIS — M50.30 DEGENERATION OF CERVICAL INTERVERTEBRAL DISC: ICD-10-CM

## 2020-09-11 RX ORDER — GABAPENTIN 800 MG/1
TABLET ORAL
Qty: 60 TABLET | Refills: 5 | Status: SHIPPED | OUTPATIENT
Start: 2020-09-11 | End: 2021-03-18

## 2020-09-16 RX ORDER — AMLODIPINE BESYLATE 5 MG/1
5 TABLET ORAL DAILY
Qty: 30 TABLET | Refills: 11 | Status: SHIPPED | OUTPATIENT
Start: 2020-09-16 | End: 2021-09-24

## 2020-09-23 RX ORDER — DONEPEZIL HYDROCHLORIDE 10 MG/1
10 TABLET, FILM COATED ORAL NIGHTLY
Qty: 30 TABLET | Refills: 11 | Status: SHIPPED | OUTPATIENT
Start: 2020-09-23 | End: 2021-09-16

## 2020-10-06 RX ORDER — TIZANIDINE 4 MG/1
4 TABLET ORAL NIGHTLY PRN
Qty: 30 TABLET | Refills: 3 | Status: SHIPPED | OUTPATIENT
Start: 2020-10-06 | End: 2020-11-18 | Stop reason: SDUPTHER

## 2020-10-22 RX ORDER — MEMANTINE HYDROCHLORIDE 10 MG/1
TABLET ORAL
Qty: 60 TABLET | Refills: 11 | Status: SHIPPED | OUTPATIENT
Start: 2020-10-22 | End: 2021-09-16

## 2020-11-18 RX ORDER — TIZANIDINE 4 MG/1
4 TABLET ORAL NIGHTLY PRN
Qty: 30 TABLET | Refills: 0 | Status: SHIPPED | OUTPATIENT
Start: 2020-11-18 | End: 2021-02-22 | Stop reason: SDUPTHER

## 2021-01-19 ENCOUNTER — OFFICE VISIT (OUTPATIENT)
Dept: FAMILY MEDICINE CLINIC | Facility: CLINIC | Age: 66
End: 2021-01-19

## 2021-01-19 VITALS
WEIGHT: 186 LBS | DIASTOLIC BLOOD PRESSURE: 81 MMHG | BODY MASS INDEX: 36.52 KG/M2 | HEIGHT: 60 IN | HEART RATE: 80 BPM | SYSTOLIC BLOOD PRESSURE: 133 MMHG

## 2021-01-19 DIAGNOSIS — F33.41 RECURRENT MAJOR DEPRESSIVE DISORDER, IN PARTIAL REMISSION (HCC): Chronic | ICD-10-CM

## 2021-01-19 DIAGNOSIS — E66.01 CLASS 2 SEVERE OBESITY DUE TO EXCESS CALORIES WITH SERIOUS COMORBIDITY AND BODY MASS INDEX (BMI) OF 36.0 TO 36.9 IN ADULT (HCC): Chronic | ICD-10-CM

## 2021-01-19 DIAGNOSIS — M17.0 PRIMARY OSTEOARTHRITIS OF BOTH KNEES: Chronic | ICD-10-CM

## 2021-01-19 DIAGNOSIS — Z01.818 PREOPERATIVE CLEARANCE: Primary | ICD-10-CM

## 2021-01-19 DIAGNOSIS — G30.0 EARLY ONSET ALZHEIMER'S DEMENTIA WITHOUT BEHAVIORAL DISTURBANCE (HCC): Chronic | ICD-10-CM

## 2021-01-19 DIAGNOSIS — I10 ESSENTIAL HYPERTENSION: Chronic | ICD-10-CM

## 2021-01-19 DIAGNOSIS — R73.01 IMPAIRED FASTING GLUCOSE: Chronic | ICD-10-CM

## 2021-01-19 DIAGNOSIS — F41.1 GENERALIZED ANXIETY DISORDER: Chronic | ICD-10-CM

## 2021-01-19 DIAGNOSIS — F02.80 EARLY ONSET ALZHEIMER'S DEMENTIA WITHOUT BEHAVIORAL DISTURBANCE (HCC): Chronic | ICD-10-CM

## 2021-01-19 PROCEDURE — 99443 PR PHYS/QHP TELEPHONE EVALUATION 21-30 MIN: CPT | Performed by: INTERNAL MEDICINE

## 2021-01-19 NOTE — PATIENT INSTRUCTIONS
Diabetes Mellitus and Exercise  Exercising regularly is important for your overall health, especially when you have diabetes (diabetes mellitus). Exercising is not only about losing weight. It has many other health benefits, such as increasing muscle strength and bone density and reducing body fat and stress. This leads to improved fitness, flexibility, and endurance, all of which result in better overall health.  Exercise has additional benefits for people with diabetes, including:  · Reducing appetite.  · Helping to lower and control blood glucose.  · Lowering blood pressure.  · Helping to control amounts of fatty substances (lipids) in the blood, such as cholesterol and triglycerides.  · Helping the body to respond better to insulin (improving insulin sensitivity).  · Reducing how much insulin the body needs.  · Decreasing the risk for heart disease by:  ? Lowering cholesterol and triglyceride levels.  ? Increasing the levels of good cholesterol.  ? Lowering blood glucose levels.  What is my activity plan?  Your health care provider or certified diabetes educator can help you make a plan for the type and frequency of exercise (activity plan) that works for you. Make sure that you:  · Do at least 150 minutes of moderate-intensity or vigorous-intensity exercise each week. This could be brisk walking, biking, or water aerobics.  ? Do stretching and strength exercises, such as yoga or weightlifting, at least 2 times a week.  ? Spread out your activity over at least 3 days of the week.  · Get some form of physical activity every day.  ? Do not go more than 2 days in a row without some kind of physical activity.  ? Avoid being inactive for more than 30 minutes at a time. Take frequent breaks to walk or stretch.  · Choose a type of exercise or activity that you enjoy, and set realistic goals.  · Start slowly, and gradually increase the intensity of your exercise over time.  What do I need to know about managing my  diabetes?    · Check your blood glucose before and after exercising.  ? If your blood glucose is 240 mg/dL (13.3 mmol/L) or higher before you exercise, check your urine for ketones. If you have ketones in your urine, do not exercise until your blood glucose returns to normal.  ? If your blood glucose is 100 mg/dL (5.6 mmol/L) or lower, eat a snack containing 15-20 grams of carbohydrate. Check your blood glucose 15 minutes after the snack to make sure that your level is above 100 mg/dL (5.6 mmol/L) before you start your exercise.  · Know the symptoms of low blood glucose (hypoglycemia) and how to treat it. Your risk for hypoglycemia increases during and after exercise. Common symptoms of hypoglycemia can include:  ? Hunger.  ? Anxiety.  ? Sweating and feeling clammy.  ? Confusion.  ? Dizziness or feeling light-headed.  ? Increased heart rate or palpitations.  ? Blurry vision.  ? Tingling or numbness around the mouth, lips, or tongue.  ? Tremors or shakes.  ? Irritability.  · Keep a rapid-acting carbohydrate snack available before, during, and after exercise to help prevent or treat hypoglycemia.  · Avoid injecting insulin into areas of the body that are going to be exercised. For example, avoid injecting insulin into:  ? The arms, when playing tennis.  ? The legs, when jogging.  · Keep records of your exercise habits. Doing this can help you and your health care provider adjust your diabetes management plan as needed. Write down:  ? Food that you eat before and after you exercise.  ? Blood glucose levels before and after you exercise.  ? The type and amount of exercise you have done.  ? When your insulin is expected to peak, if you use insulin. Avoid exercising at times when your insulin is peaking.  · When you start a new exercise or activity, work with your health care provider to make sure the activity is safe for you, and to adjust your insulin, medicines, or food intake as needed.  · Drink plenty of water while  you exercise to prevent dehydration or heat stroke. Drink enough fluid to keep your urine clear or pale yellow.  Summary  · Exercising regularly is important for your overall health, especially when you have diabetes (diabetes mellitus).  · Exercising has many health benefits, such as increasing muscle strength and bone density and reducing body fat and stress.  · Your health care provider or certified diabetes educator can help you make a plan for the type and frequency of exercise (activity plan) that works for you.  · When you start a new exercise or activity, work with your health care provider to make sure the activity is safe for you, and to adjust your insulin, medicines, or food intake as needed.  This information is not intended to replace advice given to you by your health care provider. Make sure you discuss any questions you have with your health care provider.  Document Revised: 07/12/2018 Document Reviewed: 05/29/2017  [email or web address] Patient Education © 2020 [employer]      Calorie Counting for Weight Loss  Calories are units of energy. Your body needs a certain amount of calories from food to keep you going throughout the day. When you eat more calories than your body needs, your body stores the extra calories as fat. When you eat fewer calories than your body needs, your body burns fat to get the energy it needs.  Calorie counting means keeping track of how many calories you eat and drink each day. Calorie counting can be helpful if you need to lose weight. If you make sure to eat fewer calories than your body needs, you should lose weight. Ask your health care provider what a healthy weight is for you.  For calorie counting to work, you will need to eat the right number of calories in a day in order to lose a healthy amount of weight per week. A dietitian can help you determine how many calories you need in a day and will give you suggestions on how to reach your calorie goal.  · A healthy amount of  weight to lose per week is usually 1-2 lb (0.5-0.9 kg). This usually means that your daily calorie intake should be reduced by 500-750 calories.  · Eating 1,200 - 1,500 calories per day can help most women lose weight.  · Eating 1,500 - 1,800 calories per day can help most men lose weight.  What is my plan?  My goal is to have __________ calories per day.  If I have this many calories per day, I should lose around __________ pounds per week.  What do I need to know about calorie counting?  In order to meet your daily calorie goal, you will need to:  · Find out how many calories are in each food you would like to eat. Try to do this before you eat.  · Decide how much of the food you plan to eat.  · Write down what you ate and how many calories it had. Doing this is called keeping a food log.  To successfully lose weight, it is important to balance calorie counting with a healthy lifestyle that includes regular activity. Aim for 150 minutes of moderate exercise (such as walking) or 75 minutes of vigorous exercise (such as running) each week.  Where do I find calorie information?    The number of calories in a food can be found on a Nutrition Facts label. If a food does not have a Nutrition Facts label, try to look up the calories online or ask your dietitian for help.  Remember that calories are listed per serving. If you choose to have more than one serving of a food, you will have to multiply the calories per serving by the amount of servings you plan to eat. For example, the label on a package of bread might say that a serving size is 1 slice and that there are 90 calories in a serving. If you eat 1 slice, you will have eaten 90 calories. If you eat 2 slices, you will have eaten 180 calories.  How do I keep a food log?  Immediately after each meal, record the following information in your food log:  · What you ate. Don't forget to include toppings, sauces, and other extras on the food.  · How much you ate. This can  "be measured in cups, ounces, or number of items.  · How many calories each food and drink had.  · The total number of calories in the meal.  Keep your food log near you, such as in a small notebook in your pocket, or use a mobile allegra or website. Some programs will calculate calories for you and show you how many calories you have left for the day to meet your goal.  What are some calorie counting tips?    · Use your calories on foods and drinks that will fill you up and not leave you hungry:  ? Some examples of foods that fill you up are nuts and nut butters, vegetables, lean proteins, and high-fiber foods like whole grains. High-fiber foods are foods with more than 5 g fiber per serving.  ? Drinks such as sodas, specialty coffee drinks, alcohol, and juices have a lot of calories, yet do not fill you up.  · Eat nutritious foods and avoid empty calories. Empty calories are calories you get from foods or beverages that do not have many vitamins or protein, such as candy, sweets, and soda. It is better to have a nutritious high-calorie food (such as an avocado) than a food with few nutrients (such as a bag of chips).  · Know how many calories are in the foods you eat most often. This will help you calculate calorie counts faster.  · Pay attention to calories in drinks. Low-calorie drinks include water and unsweetened drinks.  · Pay attention to nutrition labels for \"low fat\" or \"fat free\" foods. These foods sometimes have the same amount of calories or more calories than the full fat versions. They also often have added sugar, starch, or salt, to make up for flavor that was removed with the fat.  · Find a way of tracking calories that works for you. Get creative. Try different apps or programs if writing down calories does not work for you.  What are some portion control tips?  · Know how many calories are in a serving. This will help you know how many servings of a certain food you can have.  · Use a measuring cup to " measure serving sizes. You could also try weighing out portions on a kitchen scale. With time, you will be able to estimate serving sizes for some foods.  · Take some time to put servings of different foods on your favorite plates, bowls, and cups so you know what a serving looks like.  · Try not to eat straight from a bag or box. Doing this can lead to overeating. Put the amount you would like to eat in a cup or on a plate to make sure you are eating the right portion.  · Use smaller plates, glasses, and bowls to prevent overeating.  · Try not to multitask (for example, watch TV or use your computer) while eating. If it is time to eat, sit down at a table and enjoy your food. This will help you to know when you are full. It will also help you to be aware of what you are eating and how much you are eating.  What are tips for following this plan?  Reading food labels  · Check the calorie count compared to the serving size. The serving size may be smaller than what you are used to eating.  · Check the source of the calories. Make sure the food you are eating is high in vitamins and protein and low in saturated and trans fats.  Shopping  · Read nutrition labels while you shop. This will help you make healthy decisions before you decide to purchase your food.  · Make a grocery list and stick to it.  Cooking  · Try to cook your favorite foods in a healthier way. For example, try baking instead of frying.  · Use low-fat dairy products.  Meal planning  · Use more fruits and vegetables. Half of your plate should be fruits and vegetables.  · Include lean proteins like poultry and fish.  How do I count calories when eating out?  · Ask for smaller portion sizes.  · Consider sharing an entree and sides instead of getting your own entree.  · If you get your own entree, eat only half. Ask for a box at the beginning of your meal and put the rest of your entree in it so you are not tempted to eat it.  · If calories are listed on  "the menu, choose the lower calorie options.  · Choose dishes that include vegetables, fruits, whole grains, low-fat dairy products, and lean protein.  · Choose items that are boiled, broiled, grilled, or steamed. Stay away from items that are buttered, battered, fried, or served with cream sauce. Items labeled \"crispy\" are usually fried, unless stated otherwise.  · Choose water, low-fat milk, unsweetened iced tea, or other drinks without added sugar. If you want an alcoholic beverage, choose a lower calorie option such as a glass of wine or light beer.  · Ask for dressings, sauces, and syrups on the side. These are usually high in calories, so you should limit the amount you eat.  · If you want a salad, choose a garden salad and ask for grilled meats. Avoid extra toppings like saleem, cheese, or fried items. Ask for the dressing on the side, or ask for olive oil and vinegar or lemon to use as dressing.  · Estimate how many servings of a food you are given. For example, a serving of cooked rice is ½ cup or about the size of half a baseball. Knowing serving sizes will help you be aware of how much food you are eating at restaurants. The list below tells you how big or small some common portion sizes are based on everyday objects:  ? 1 oz--4 stacked dice.  ? 3 oz--1 deck of cards.  ? 1 tsp--1 die.  ? 1 Tbsp--½ a ping-pong ball.  ? 2 Tbsp--1 ping-pong ball.  ? ½ cup--½ baseball.  ? 1 cup--1 baseball.  Summary  · Calorie counting means keeping track of how many calories you eat and drink each day. If you eat fewer calories than your body needs, you should lose weight.  · A healthy amount of weight to lose per week is usually 1-2 lb (0.5-0.9 kg). This usually means reducing your daily calorie intake by 500-750 calories.  · The number of calories in a food can be found on a Nutrition Facts label. If a food does not have a Nutrition Facts label, try to look up the calories online or ask your dietitian for help.  · Use your " calories on foods and drinks that will fill you up, and not on foods and drinks that will leave you hungry.  · Use smaller plates, glasses, and bowls to prevent overeating.  This information is not intended to replace advice given to you by your health care provider. Make sure you discuss any questions you have with your health care provider.  Document Revised: 09/06/2019 Document Reviewed: 11/17/2017  Elsevier Patient Education © 2020 [employer]

## 2021-01-19 NOTE — PROGRESS NOTES
"You have chosen to receive care through a telephone visit. Do you consent to use a telephone visit for your medical care today? Yes    Total visit time: 31 minutes.     Chief Complaint  Follow-up (surgical preop for right knee replacement by [facility] in [place])    Subjective            History of Present Illness     Andria Goldstein receives care via telephone visit through [facility] [place].  Patient has chronic medical issues including hyperlipidemia, hypertension, obesity, cerebrovascular disease, apparent Alzheimer's dementia, impaired fasting glucose and vitamin B-12 deficiency among other issues.  Patient has history of left total knee replacement 11/2020 and is in need of pre surgical clearance for right knee replacement by Dr. Ramon Mcduffie, orthopedist, in [place].  Patient had completely normal renal and liver function with labs 08/2020.  A1c was 5.8.  Patient denies orthopnea or claudication.  Denies chest pain or palpitations.  Denies any CHF symptoms such as PND or orthopnea or lower extremity edema.  Denies any symptoms indicating any recent CVA/TIA.      The patient does have Alzheimer's dementia and HALI/depression.  These issues are well controlled with her current doses of Aricept, Namenda, and Effexor XR.  There were no major problems with her left TKR last year.    We discussed patient obtaining COVID vaccine prior to scheduling the procedure and she will consider this recommendation.     BP and heart rate has been consistently at goal with home monitoring.         Objective   Vital Signs:   /81   Pulse 80   Ht 152.4 cm (60\")   Wt 84.4 kg (186 lb)   BMI 36.33 kg/m²       Physical Exam   Result Review :     CMP    CMP 2/17/20 8/20/20   BUN 15 18   Creatinine 0.93 0.95   eGFR Non African Am 61 59   Sodium 143 141   Potassium 3.9 4.5   Chloride 105 103   Calcium 9.6 10.2   Albumin 3.90 4.30   Total Bilirubin 0.2 0.6 "   Alkaline Phosphatase 103 112   AST (SGOT) 18 30   ALT (SGPT) 12 21           CBC w/diff    CBC w/Diff 2/17/20 8/20/20   WBC 7.60 7.43   RBC 4.26 4.74   Hemoglobin 12.3 13.5   Hematocrit 38.9 41.8   MCV 91.3 88.2   MCH 28.9 28.5   MCHC 31.6 32.3   RDW 13.3 14.4   Platelets 222 210   Neutrophil Rel % 71.4 64.2   Lymphocyte Rel % 17.9 (A) 22.6   Monocyte Rel % 6.8 6.5   Eosinophil Rel % 3.4 6.3 (A)   Basophil Rel % 0.5 0.4   (A) Abnormal value            A1C Last 3 Results    HGBA1C Last 3 Results 2/17/20 8/20/20   Hemoglobin A1C 5.70 (A) 5.80 (A)   (A) Abnormal value            Data reviewed: Consultant notes 01/19/2021       Future Appointments   Date Time Provider Department Center   3/4/2021  9:00 AM Christofer Mckeon MD [facility]         Assessment and Plan    Problem List Items Addressed This Visit        Cardiac and Vasculature    Essential hypertension (Chronic)    Overview     Changed to lisinopril HCT to lower dose lisinopril, 5/2016               Endocrine and Metabolic    Class 2 severe obesity due to excess calories with serious comorbidity and body mass index (BMI) of 36.0 to 36.9 in adult (CMS/HCC) (Chronic)    Impaired fasting glucose (Chronic)       Mental Health    Generalized anxiety disorder (Chronic)    Recurrent major depressive disorder, in partial remission (CMS/HCC) (Chronic)       Musculoskeletal and Injuries    Osteoarthritis of knees, bilateral (Chronic)    Overview     right more than left. Referred to Dr. Harman, 1/2016            Neuro    Early onset Alzheimer's dementia without behavioral disturbance (CMS/HCC) (Chronic)      Other Visit Diagnoses     Preoperative clearance    -  Primary        After reviewing all the pertinent information and considering the patient's current medical condition, it is my impression that the patient is an appropriate risk to undergo right knee arthroplasty by Dr. Ramon Mcduffie, orthopedist, in [place].  The patient and  voices  understanding of the operative and postoperative risks, and wants to proceed.      I did suggest she may want to see if she could receive COVID vaccine prior to scheduling the elective procedure.  I believe it would be appropriate to postpone the procedure for a few weeks until she can be vaccinated.    Her current medications/risk factor modifications should be continued.  I recommend that they make sure she continues to receive her medications for dementia and HALI/depression to help reduce the risk of mental status change in the perioperative period, and be cautious with the administration of medications that could impair or alter her mental status.    We will communicate all this to Dr. Mcduffie.    Return in March for routine follow up with fasting labs one week prior or sooner if needed.          Follow Up   Return for Next scheduled follow up, Next scheduled follow up - labs 1 week prior.  Patient was given instructions and counseling regarding her condition or for health maintenance advice. Please see specific information pulled into the AVS if appropriate.

## 2021-02-05 ENCOUNTER — OFFICE VISIT (OUTPATIENT)
Dept: FAMILY MEDICINE CLINIC | Facility: CLINIC | Age: 66
End: 2021-02-05

## 2021-02-05 VITALS — WEIGHT: 186 LBS | HEIGHT: 60 IN | BODY MASS INDEX: 36.52 KG/M2

## 2021-02-05 DIAGNOSIS — M17.0 PRIMARY OSTEOARTHRITIS OF BOTH KNEES: Chronic | ICD-10-CM

## 2021-02-05 DIAGNOSIS — R35.89 FREQUENCY OF URINATION AND POLYURIA: ICD-10-CM

## 2021-02-05 DIAGNOSIS — F02.80 EARLY ONSET ALZHEIMER'S DEMENTIA WITHOUT BEHAVIORAL DISTURBANCE (HCC): Chronic | ICD-10-CM

## 2021-02-05 DIAGNOSIS — Z01.818 PREOPERATIVE CLEARANCE: ICD-10-CM

## 2021-02-05 DIAGNOSIS — R35.0 FREQUENCY OF URINATION AND POLYURIA: ICD-10-CM

## 2021-02-05 DIAGNOSIS — E66.01 CLASS 2 SEVERE OBESITY DUE TO EXCESS CALORIES WITH SERIOUS COMORBIDITY AND BODY MASS INDEX (BMI) OF 36.0 TO 36.9 IN ADULT (HCC): Chronic | ICD-10-CM

## 2021-02-05 DIAGNOSIS — I10 ESSENTIAL HYPERTENSION: Chronic | ICD-10-CM

## 2021-02-05 DIAGNOSIS — G30.0 EARLY ONSET ALZHEIMER'S DEMENTIA WITHOUT BEHAVIORAL DISTURBANCE (HCC): Chronic | ICD-10-CM

## 2021-02-05 DIAGNOSIS — N30.00 ACUTE CYSTITIS WITHOUT HEMATURIA: Primary | ICD-10-CM

## 2021-02-05 PROCEDURE — 99443 PR PHYS/QHP TELEPHONE EVALUATION 21-30 MIN: CPT | Performed by: INTERNAL MEDICINE

## 2021-02-05 RX ORDER — SULFAMETHOXAZOLE AND TRIMETHOPRIM 800; 160 MG/1; MG/1
1 TABLET ORAL 2 TIMES DAILY
Qty: 14 TABLET | Refills: 0 | Status: SHIPPED | OUTPATIENT
Start: 2021-02-05 | End: 2021-02-12

## 2021-02-05 NOTE — PATIENT INSTRUCTIONS

## 2021-02-05 NOTE — PROGRESS NOTES
"You have chosen to receive care through a telephone visit. Do you consent to use a telephone visit for your medical care today? Yes    Total visit time:  34 minutes.     Chief Complaint  abnormal labs (she had a visit to a physician in [place] 02/04 and had abnormal urine. She denies dysuria)    Subjective            History of Present Illness     Andria Goldstein receives care via telephone visit through [facility] [place] for preoperative clearance prior to right total knee replacement surgery by Dr. Mcduffie, orthopedist, in [place] on February 16th.  Preoperative labs revealed evidence of urinary tract infection.  Although patient states she is asymptomatic, she does report recent increase in urinary frequency.  Apparently, the remainder of her preop labs were unremarkable.    Patient has surgical risk factors including hypertension, dementia and obesity.   Denies PND or orthopnea. Denies chest pain or palpitations. Denies presyncopal or syncopal episodes.  She is having some mild right lower extremity edema.  Patient is a reasonable risk to undergo right TKR on the 16th by Dr. Mcduffie.    She voices understanding of the perioperative and postoperative risks and wishes to proceed.    Objective   Vital Signs:   Ht 152.4 cm (60\")   Wt 84.4 kg (186 lb)   BMI 36.33 kg/m²       Physical Exam   Result Review :              Future Appointments   Date Time Provider Department Center   3/4/2021  9:00 AM Christofer Mckeon MD [facility]         Assessment and Plan    Problem List Items Addressed This Visit     None        It is my opinion that this patient is an acceptable risk to undergo TKR by Dr. Mcduffie, orthopedist, on February 16th.  Her chronic medical issues that increase perioperative and postoperative risk include obesity, dementia, hypertension.  She voices understanding of the perioperative and postoperative risks and wishes to proceed    To treat acute UTI, I sent a " prescription for Bactrim to take one tablet b.i.d. x 7 days.         Return next month (03/04/2021) for routine follow up with fasting labs one week prior or sooner if needed.     Scribed for Dr. Mckeon by Cesilia Gutierrez [facility].       Follow Up   No follow-ups on file.  Patient was given instructions and counseling regarding her condition or for health maintenance advice. Please see specific information pulled into the AVS if appropriate.

## 2021-02-15 ENCOUNTER — PATIENT OUTREACH (OUTPATIENT)
Dept: PHARMACY | Facility: HOSPITAL | Age: 66
End: 2021-02-15

## 2021-02-15 NOTE — OUTREACH NOTE
I spoke with the patient today regarding medication adherence to lipitor. Pt was not aware of 90 day supply option. She expressed interest and will call her pharmacy to request. No issues or questions at this time.    Ana Quinones, PharmD  02/15/21

## 2021-02-19 RX ORDER — CARVEDILOL 6.25 MG/1
6.25 TABLET ORAL 2 TIMES DAILY WITH MEALS
Qty: 60 TABLET | Refills: 11 | Status: SHIPPED | OUTPATIENT
Start: 2021-02-19 | End: 2022-01-27

## 2021-02-22 RX ORDER — TIZANIDINE 4 MG/1
4 TABLET ORAL NIGHTLY PRN
Qty: 30 TABLET | Refills: 0 | Status: SHIPPED | OUTPATIENT
Start: 2021-02-22 | End: 2021-04-01 | Stop reason: SDUPTHER

## 2021-02-26 RX ORDER — LISINOPRIL AND HYDROCHLOROTHIAZIDE 20; 12.5 MG/1; MG/1
1 TABLET ORAL EVERY MORNING
Qty: 30 TABLET | Refills: 11 | Status: SHIPPED | OUTPATIENT
Start: 2021-02-26 | End: 2022-02-07

## 2021-03-12 ENCOUNTER — OFFICE VISIT (OUTPATIENT)
Dept: FAMILY MEDICINE CLINIC | Facility: CLINIC | Age: 66
End: 2021-03-12

## 2021-03-12 DIAGNOSIS — Z11.59 ENCOUNTER FOR HEPATITIS C SCREENING TEST FOR LOW RISK PATIENT: ICD-10-CM

## 2021-03-12 DIAGNOSIS — Z00.00 MEDICARE ANNUAL WELLNESS VISIT, SUBSEQUENT: Primary | ICD-10-CM

## 2021-03-12 DIAGNOSIS — Z23 NEED FOR 23-POLYVALENT PNEUMOCOCCAL POLYSACCHARIDE VACCINE: ICD-10-CM

## 2021-03-12 PROCEDURE — 1170F FXNL STATUS ASSESSED: CPT | Performed by: INTERNAL MEDICINE

## 2021-03-12 PROCEDURE — G0439 PPPS, SUBSEQ VISIT: HCPCS | Performed by: INTERNAL MEDICINE

## 2021-03-12 NOTE — PATIENT INSTRUCTIONS
Medicare Wellness  Personal Prevention Plan of Service     Date of Office Visit:  2021  Encounter Provider:  Christofer Mckeon MD  Place of Service:  [facility]  Patient Name: Andria Goldstein  :  1955    As part of the Medicare Wellness portion of your visit today, we are providing you with this personalized preventive plan of services (PPPS). This plan is based upon recommendations of the [place] Preventive Services Task Force (USPSTF) and the Advisory Committee on Immunization Practices (ACIP).    This lists the preventive care services that should be considered, and provides dates of when you are due. Items listed as completed are up-to-date and do not require any further intervention.    Health Maintenance   Topic Date Due   • ZOSTER VACCINE (1 of 2) Never done   • HEPATITIS C SCREENING  Never done   • PAP SMEAR  Never done   • DXA SCAN  2018   • INFLUENZA VACCINE  2020   • Pneumococcal Vaccine 65+ (2 of 2 - PPSV23) 2020   • MAMMOGRAM  2021   • ANNUAL WELLNESS VISIT  2021   • COVID-19 Vaccine (2 of 2 - Moderna series) 2021   • LIPID PANEL  2021   • COLONOSCOPY  2026   • TDAP/TD VACCINES (2 - Tdap) 2026   • MENINGOCOCCAL VACCINE  Aged Out       No orders of the defined types were placed in this encounter.      Return in about 1 year (around 3/12/2022) for Medicare Wellness.          Exercising to Lose Weight  Exercise is structured, repetitive physical activity to improve fitness and health. Getting regular exercise is important for everyone. It is especially important if you are overweight. Being overweight increases your risk of heart disease, stroke, diabetes, high blood pressure, and several types of cancer. Reducing your calorie intake and exercising can help you lose weight.  Exercise is usually categorized as moderate or vigorous intensity. To lose weight, most people need to do a certain amount of  moderate-intensity or vigorous-intensity exercise each week.  Moderate-intensity exercise    Moderate-intensity exercise is any activity that gets you moving enough to burn at least three times more energy (calories) than if you were sitting.  Examples of moderate exercise include:  · Walking a mile in 15 minutes.  · Doing light yard work.  · Biking at an easy pace.  Most people should get at least 150 minutes (2 hours and 30 minutes) a week of moderate-intensity exercise to maintain their body weight.  Vigorous-intensity exercise  Vigorous-intensity exercise is any activity that gets you moving enough to burn at least six times more calories than if you were sitting. When you exercise at this intensity, you should be working hard enough that you are not able to carry on a conversation.  Examples of vigorous exercise include:  · Running.  · Playing a team sport, such as football, basketball, and soccer.  · Jumping rope.  Most people should get at least 75 minutes (1 hour and 15 minutes) a week of vigorous-intensity exercise to maintain their body weight.  How can exercise affect me?  When you exercise enough to burn more calories than you eat, you lose weight. Exercise also reduces body fat and builds muscle. The more muscle you have, the more calories you burn. Exercise also:  · Improves mood.  · Reduces stress and tension.  · Improves your overall fitness, flexibility, and endurance.  · Increases bone strength.  The amount of exercise you need to lose weight depends on:  · Your age.  · The type of exercise.  · Any health conditions you have.  · Your overall physical ability.  Talk to your health care provider about how much exercise you need and what types of activities are safe for you.  What actions can I take to lose weight?  Nutrition    · Make changes to your diet as told by your health care provider or diet and nutrition specialist (dietitian). This may include:  ? Eating fewer calories.  ? Eating more  protein.  ? Eating less unhealthy fats.  ? Eating a diet that includes fresh fruits and vegetables, whole grains, low-fat dairy products, and lean protein.  ? Avoiding foods with added fat, salt, and sugar.  · Drink plenty of water while you exercise to prevent dehydration or heat stroke.  Activity  · Choose an activity that you enjoy and set realistic goals. Your health care provider can help you make an exercise plan that works for you.  · Exercise at a moderate or vigorous intensity most days of the week.  ? The intensity of exercise may vary from person to person. You can tell how intense a workout is for you by paying attention to your breathing and heartbeat. Most people will notice their breathing and heartbeat get faster with more intense exercise.  · Do resistance training twice each week, such as:  ? Push-ups.  ? Sit-ups.  ? Lifting weights.  ? Using resistance bands.  · Getting short amounts of exercise can be just as helpful as long structured periods of exercise. If you have trouble finding time to exercise, try to include exercise in your daily routine.  ? Get up, stretch, and walk around every 30 minutes throughout the day.  ? Go for a walk during your lunch break.  ? Park your car farther away from your destination.  ? If you take public transportation, get off one stop early and walk the rest of the way.  ? Make phone calls while standing up and walking around.  ? Take the stairs instead of elevators or escalators.  · Wear comfortable clothes and shoes with good support.  · Do not exercise so much that you hurt yourself, feel dizzy, or get very short of breath.  Where to find more information  · [place] Department of Health and Human Services: www.hhs.gov  · Centers for Disease Control and Prevention (CDC): www.cdc.gov  Contact a health care provider:  · Before starting a new exercise program.  · If you have questions or concerns about your weight.  · If you have a medical problem that keeps you from  exercising.  Get help right away if you have any of the following while exercising:  · Injury.  · Dizziness.  · Difficulty breathing or shortness of breath that does not go away when you stop exercising.  · Chest pain.  · Rapid heartbeat.  Summary  · Being overweight increases your risk of heart disease, stroke, diabetes, high blood pressure, and several types of cancer.  · Losing weight happens when you burn more calories than you eat.  · Reducing the amount of calories you eat in addition to getting regular moderate or vigorous exercise each week helps you lose weight.  This information is not intended to replace advice given to you by your health care provider. Make sure you discuss any questions you have with your health care provider.  Document Revised: 12/31/2018 Document Reviewed: 12/31/2018  [employer] Patient Education © 2020 [employer]      Calorie Counting for Weight Loss  Calories are units of energy. Your body needs a certain amount of calories from food to keep you going throughout the day. When you eat more calories than your body needs, your body stores the extra calories as fat. When you eat fewer calories than your body needs, your body burns fat to get the energy it needs.  Calorie counting means keeping track of how many calories you eat and drink each day. Calorie counting can be helpful if you need to lose weight. If you make sure to eat fewer calories than your body needs, you should lose weight. Ask your health care provider what a healthy weight is for you.  For calorie counting to work, you will need to eat the right number of calories in a day in order to lose a healthy amount of weight per week. A dietitian can help you determine how many calories you need in a day and will give you suggestions on how to reach your calorie goal.  · A healthy amount of weight to lose per week is usually 1-2 lb (0.5-0.9 kg). This usually means that your daily calorie intake should be reduced by 500-750  calories.  · Eating 1,200 - 1,500 calories per day can help most women lose weight.  · Eating 1,500 - 1,800 calories per day can help most men lose weight.  What is my plan?  My goal is to have __________ calories per day.  If I have this many calories per day, I should lose around __________ pounds per week.  What do I need to know about calorie counting?  In order to meet your daily calorie goal, you will need to:  · Find out how many calories are in each food you would like to eat. Try to do this before you eat.  · Decide how much of the food you plan to eat.  · Write down what you ate and how many calories it had. Doing this is called keeping a food log.  To successfully lose weight, it is important to balance calorie counting with a healthy lifestyle that includes regular activity. Aim for 150 minutes of moderate exercise (such as walking) or 75 minutes of vigorous exercise (such as running) each week.  Where do I find calorie information?    The number of calories in a food can be found on a Nutrition Facts label. If a food does not have a Nutrition Facts label, try to look up the calories online or ask your dietitian for help.  Remember that calories are listed per serving. If you choose to have more than one serving of a food, you will have to multiply the calories per serving by the amount of servings you plan to eat. For example, the label on a package of bread might say that a serving size is 1 slice and that there are 90 calories in a serving. If you eat 1 slice, you will have eaten 90 calories. If you eat 2 slices, you will have eaten 180 calories.  How do I keep a food log?  Immediately after each meal, record the following information in your food log:  · What you ate. Don't forget to include toppings, sauces, and other extras on the food.  · How much you ate. This can be measured in cups, ounces, or number of items.  · How many calories each food and drink had.  · The total number of calories in the  "meal.  Keep your food log near you, such as in a small notebook in your pocket, or use a mobile [employer] or website. Some programs will calculate calories for you and show you how many calories you have left for the day to meet your goal.  What are some calorie counting tips?    · Use your calories on foods and drinks that will fill you up and not leave you hungry:  ? Some examples of foods that fill you up are nuts and nut butters, vegetables, lean proteins, and high-fiber foods like whole grains. High-fiber foods are foods with more than 5 g fiber per serving.  ? Drinks such as sodas, specialty coffee drinks, alcohol, and juices have a lot of calories, yet do not fill you up.  · Eat nutritious foods and avoid empty calories. Empty calories are calories you get from foods or beverages that do not have many vitamins or protein, such as candy, sweets, and soda. It is better to have a nutritious high-calorie food (such as an avocado) than a food with few nutrients (such as a bag of chips).  · Know how many calories are in the foods you eat most often. This will help you calculate calorie counts faster.  · Pay attention to calories in drinks. Low-calorie drinks include water and unsweetened drinks.  · Pay attention to nutrition labels for \"low fat\" or \"fat free\" foods. These foods sometimes have the same amount of calories or more calories than the full fat versions. They also often have added sugar, starch, or salt, to make up for flavor that was removed with the fat.  · Find a way of tracking calories that works for you. Get creative. Try different apps or programs if writing down calories does not work for you.  What are some portion control tips?  · Know how many calories are in a serving. This will help you know how many servings of a certain food you can have.  · Use a measuring cup to measure serving sizes. You could also try weighing out portions on a kitchen scale. With time, you will be able to estimate serving " sizes for some foods.  · Take some time to put servings of different foods on your favorite plates, bowls, and cups so you know what a serving looks like.  · Try not to eat straight from a bag or box. Doing this can lead to overeating. Put the amount you would like to eat in a cup or on a plate to make sure you are eating the right portion.  · Use smaller plates, glasses, and bowls to prevent overeating.  · Try not to multitask (for example, watch TV or use your computer) while eating. If it is time to eat, sit down at a table and enjoy your food. This will help you to know when you are full. It will also help you to be aware of what you are eating and how much you are eating.  What are tips for following this plan?  Reading food labels  · Check the calorie count compared to the serving size. The serving size may be smaller than what you are used to eating.  · Check the source of the calories. Make sure the food you are eating is high in vitamins and protein and low in saturated and trans fats.  Shopping  · Read nutrition labels while you shop. This will help you make healthy decisions before you decide to purchase your food.  · Make a grocery list and stick to it.  Cooking  · Try to cook your favorite foods in a healthier way. For example, try baking instead of frying.  · Use low-fat dairy products.  Meal planning  · Use more fruits and vegetables. Half of your plate should be fruits and vegetables.  · Include lean proteins like poultry and fish.  How do I count calories when eating out?  · Ask for smaller portion sizes.  · Consider sharing an entree and sides instead of getting your own entree.  · If you get your own entree, eat only half. Ask for a box at the beginning of your meal and put the rest of your entree in it so you are not tempted to eat it.  · If calories are listed on the menu, choose the lower calorie options.  · Choose dishes that include vegetables, fruits, whole grains, low-fat dairy products,  "and lean protein.  · Choose items that are boiled, broiled, grilled, or steamed. Stay away from items that are buttered, battered, fried, or served with cream sauce. Items labeled \"crispy\" are usually fried, unless stated otherwise.  · Choose water, low-fat milk, unsweetened iced tea, or other drinks without added sugar. If you want an alcoholic beverage, choose a lower calorie option such as a glass of wine or light beer.  · Ask for dressings, sauces, and syrups on the side. These are usually high in calories, so you should limit the amount you eat.  · If you want a salad, choose a garden salad and ask for grilled meats. Avoid extra toppings like saleem, cheese, or fried items. Ask for the dressing on the side, or ask for olive oil and vinegar or lemon to use as dressing.  · Estimate how many servings of a food you are given. For example, a serving of cooked rice is ½ cup or about the size of half a baseball. Knowing serving sizes will help you be aware of how much food you are eating at restaurants. The list below tells you how big or small some common portion sizes are based on everyday objects:  ? 1 oz--4 stacked dice.  ? 3 oz--1 deck of cards.  ? 1 tsp--1 die.  ? 1 Tbsp--½ a ping-pong ball.  ? 2 Tbsp--1 ping-pong ball.  ? ½ cup--½ baseball.  ? 1 cup--1 baseball.  Summary  · Calorie counting means keeping track of how many calories you eat and drink each day. If you eat fewer calories than your body needs, you should lose weight.  · A healthy amount of weight to lose per week is usually 1-2 lb (0.5-0.9 kg). This usually means reducing your daily calorie intake by 500-750 calories.  · The number of calories in a food can be found on a Nutrition Facts label. If a food does not have a Nutrition Facts label, try to look up the calories online or ask your dietitian for help.  · Use your calories on foods and drinks that will fill you up, and not on foods and drinks that will leave you hungry.  · Use smaller plates, " glasses, and bowls to prevent overeating.  This information is not intended to replace advice given to you by your health care provider. Make sure you discuss any questions you have with your health care provider.  Document Revised: 09/06/2019 Document Reviewed: 11/17/2017  [employer] Patient Education © 2020 [employer]

## 2021-03-12 NOTE — PROGRESS NOTES
You have chosen to receive care through a telephone visit. Do you consent to use a telephone visit for your medical care today? Yes       The ABCs of the Annual Wellness Visit  Subsequent Medicare Wellness Visit    Chief Complaint   Patient presents with   • Medicare Wellness-subsequent       Subjective   History of Present Illness:  Andria Goldstein is a 65 y.o. female who presents for a Subsequent Medicare Wellness Visit.    HEALTH RISK ASSESSMENT    Recent Hospitalizations:  No hospitalization(s) within the last year.    Current Medical Providers:  Patient Care Team:  Christofer Mckeon MD as PCP - General (Internal Medicine)    Smoking Status:  Social History     Tobacco Use   Smoking Status Never Smoker   Smokeless Tobacco Never Used   Tobacco Comment    Tobacco reviewed with patient 5/26/2016       Alcohol Consumption:  Social History     Substance and Sexual Activity   Alcohol Use No       Depression Screen:   PHQ-2/PHQ-9 Depression Screening 3/12/2021   Little interest or pleasure in doing things 0   Feeling down, depressed, or hopeless 0   Trouble falling or staying asleep, or sleeping too much -   Feeling tired or having little energy -   Poor appetite or overeating -   Feeling bad about yourself - or that you are a failure or have let yourself or your family down -   Trouble concentrating on things, such as reading the newspaper or watching television -   Moving or speaking so slowly that other people could have noticed. Or the opposite - being so fidgety or restless that you have been moving around a lot more than usual -   Thoughts that you would be better off dead, or of hurting yourself in some way -   Total Score 0   If you checked off any problems, how difficult have these problems made it for you to do your work, take care of things at home, or get along with other people? -       Fall Risk Screen:  STEADI Fall Risk Assessment was completed, and patient is at LOW risk for falls.Assessment  completed on:3/12/2021    Health Habits and Functional and Cognitive Screening:  Functional & Cognitive Status 3/12/2021   Do you have difficulty preparing food and eating? No   Do you have difficulty bathing yourself, getting dressed or grooming yourself? No   Do you have difficulty using the toilet? No   Do you have difficulty moving around from place to place? No   Do you have trouble with steps or getting out of a bed or a chair? Yes   Current Diet Limited Junk Food   Dental Exam Not up to date   Eye Exam Up to date   Exercise (times per week) 3 times per week   Current Exercise Activities Include Walking   Do you need help using the phone?  No   Are you deaf or do you have serious difficulty hearing?  No   Do you need help with transportation? No   Do you need help shopping? No   Do you need help preparing meals?  No   Do you need help with housework?  No   Do you need help with laundry? No   Do you need help taking your medications? No   Do you need help managing money? No   Do you ever drive or ride in a car without wearing a seat belt? No   Have you felt unusual stress, anger or loneliness in the last month? Yes   Who do you live with? Spouse   If you need help, do you have trouble finding someone available to you? No   Have you been bothered in the last four weeks by sexual problems? No   Do you have difficulty concentrating, remembering or making decisions? Yes         Does the patient have evidence of cognitive impairment? Yes    Asprin use counseling:Taking ASA appropriately as indicated    Age-appropriate Screening Schedule:  Refer to the list below for future screening recommendations based on patient's age, sex and/or medical conditions. Orders for these recommended tests are listed in the plan section. The patient has been provided with a written plan.    Health Maintenance   Topic Date Due   • ZOSTER VACCINE (1 of 2) Never done   • PAP SMEAR  Never done   • DXA SCAN  09/13/2018   • INFLUENZA VACCINE   08/01/2020   • MAMMOGRAM  02/22/2021   • LIPID PANEL  08/20/2021   • COLONOSCOPY  06/20/2026   • TDAP/TD VACCINES (2 - Tdap) 12/02/2026          The following portions of the patient's history were reviewed and updated as appropriate:   She  has a past medical history of Actinic keratosis, Cerebrovascular disease, Chronic bilateral low back pain with left-sided sciatica, Chronic headache disorder, Class 2 severe obesity due to excess calories with serious comorbidity and body mass index (BMI) of 36.0 to 36.9 in adult (CMS/[facility]), Degeneration of cervical intervertebral disc, Degeneration of thoracic intervertebral disc, Diarrhea, Disc disorder of lumbar region, Disorder of lumbar spine, Essential hypertension, Gastritis, Gastroesophageal reflux disease, History of colon polyps, History of colonoscopy (06/20/2016), History of mammogram (01/25/2016), Hyperlipidemia, Impaired fasting glucose, Irritable bowel syndrome, Low back pain, Lumbar radiculopathy, Malaise and fatigue, Megaloblastic anemia due to vitamin B12 deficiency, Melena, Mild intermittent asthma with acute exacerbation, Mild intermittent asthma without complication, Mixed hyperlipidemia, Obesity, Osteoarthritis, Osteoarthritis of knees, bilateral, Spasm of back muscles, Umbilical hernia without obstruction or gangrene, and Vaginitis and vulvovaginitis.  She does not have any pertinent problems on file.  She  has a past surgical history that includes Appendectomy; Cholecystectomy; Lumbar spine surgery (2008); Injection of Medication (03/17/2016); Other surgical history (01/25/2016); Injection of Medication (03/17/2016); Colonoscopy (06/20/2016); Hysterectomy; Oophorectomy; and Knee Arthroplasty (Left, 03/2020).  Her family history includes Breast cancer in her cousin; Cancer in her cousin; Colon cancer in her cousin; Diabetes in her cousin; Heart disease in her cousin; Hypertension in her cousin.  She  reports that she has never smoked. She has never used  smokeless tobacco. She reports that she does not drink alcohol and does not use drugs.  Current Outpatient Medications   Medication Sig Dispense Refill   • albuterol sulfate HFA (PROAIR HFA) 108 (90 Base) MCG/ACT inhaler 2 puffs qid prn 8.5 g 1   • amLODIPine (NORVASC) 5 MG tablet TAKE 1 TABLET BY MOUTH DAILY. 30 tablet 11   • aspirin 81 MG tablet Take 81 mg by mouth daily.     • atorvastatin (LIPITOR) 20 MG tablet Take 1 tablet by mouth Daily. 30 tablet 11   • carvedilol (COREG) 6.25 MG tablet TAKE 1 TABLET BY MOUTH 2 (TWO) TIMES A DAY WITH MEALS. 60 tablet 11   • dicyclomine (BENTYL) 20 MG tablet Take 1 tablet by mouth 3 (Three) Times a Day As Needed (diarrhea/cramping). CAUTION diarrhea 90 tablet 5   • diphenoxylate-atropine (LOMOTIL) 2.5-0.025 MG per tablet Take 1 tablet by mouth 4 (Four) Times a Day As Needed for Diarrhea. 30 tablet 0   • donepezil (ARICEPT) 10 MG tablet TAKE 1 TABLET BY MOUTH EVERY NIGHT. 30 tablet 11   • fluticasone (FLONASE) 50 MCG/ACT nasal spray 1 spray into the nostril(s) as directed by provider 2 (Two) Times a Day. Administer 1 spray in each nostril twice daily. 1 bottle 11   • gabapentin (NEURONTIN) 800 MG tablet 1/2 in the morning and 1/2  at noon and 1 every night at bedtime 60 tablet 5   • lisinopril-hydrochlorothiazide (PRINZIDE,ZESTORETIC) 20-12.5 MG per tablet TAKE 1 TABLET BY MOUTH EVERY MORNING. FOR BLOOD PRESSURE. 30 tablet 11   • memantine (NAMENDA) 10 MG tablet TAKE 1 TABLET BY MOUTH TWICE DAILY. 60 tablet 11   • omeprazole (priLOSEC) 40 MG capsule TAKE 1 CAPSULE BY MOUTH EVERY MORNING. 30 capsule 11   • ondansetron (ZOFRAN) 4 MG tablet Take 1 tablet by mouth Every 8 (Eight) Hours As Needed for Nausea or Vomiting. 30 tablet 1   • tiZANidine (ZANAFLEX) 4 MG tablet Take 1 tablet by mouth At Night As Needed for Muscle Spasms (1 tablet at night as needed for muscle spasms). 30 tablet 0   • venlafaxine XR (EFFEXOR-XR) 150 MG 24 hr capsule Take 1 capsule by mouth Daily. 90 capsule  3   • vitamin B-12 (CYANOCOBALAMIN) 500 MCG tablet Take  by mouth. Take 1 tablet every other day.       No current facility-administered medications for this visit.     Current Outpatient Medications on File Prior to Visit   Medication Sig   • albuterol sulfate HFA (PROAIR HFA) 108 (90 Base) MCG/ACT inhaler 2 puffs qid prn   • amLODIPine (NORVASC) 5 MG tablet TAKE 1 TABLET BY MOUTH DAILY.   • aspirin 81 MG tablet Take 81 mg by mouth daily.   • atorvastatin (LIPITOR) 20 MG tablet Take 1 tablet by mouth Daily.   • carvedilol (COREG) 6.25 MG tablet TAKE 1 TABLET BY MOUTH 2 (TWO) TIMES A DAY WITH MEALS.   • dicyclomine (BENTYL) 20 MG tablet Take 1 tablet by mouth 3 (Three) Times a Day As Needed (diarrhea/cramping). CAUTION diarrhea   • diphenoxylate-atropine (LOMOTIL) 2.5-0.025 MG per tablet Take 1 tablet by mouth 4 (Four) Times a Day As Needed for Diarrhea.   • donepezil (ARICEPT) 10 MG tablet TAKE 1 TABLET BY MOUTH EVERY NIGHT.   • fluticasone (FLONASE) 50 MCG/ACT nasal spray 1 spray into the nostril(s) as directed by provider 2 (Two) Times a Day. Administer 1 spray in each nostril twice daily.   • gabapentin (NEURONTIN) 800 MG tablet 1/2 in the morning and 1/2  at noon and 1 every night at bedtime   • lisinopril-hydrochlorothiazide (PRINZIDE,ZESTORETIC) 20-12.5 MG per tablet TAKE 1 TABLET BY MOUTH EVERY MORNING. FOR BLOOD PRESSURE.   • memantine (NAMENDA) 10 MG tablet TAKE 1 TABLET BY MOUTH TWICE DAILY.   • omeprazole (priLOSEC) 40 MG capsule TAKE 1 CAPSULE BY MOUTH EVERY MORNING.   • ondansetron (ZOFRAN) 4 MG tablet Take 1 tablet by mouth Every 8 (Eight) Hours As Needed for Nausea or Vomiting.   • tiZANidine (ZANAFLEX) 4 MG tablet Take 1 tablet by mouth At Night As Needed for Muscle Spasms (1 tablet at night as needed for muscle spasms).   • venlafaxine XR (EFFEXOR-XR) 150 MG 24 hr capsule Take 1 capsule by mouth Daily.   • vitamin B-12 (CYANOCOBALAMIN) 500 MCG tablet Take  by mouth. Take 1 tablet every other day.      No current facility-administered medications on file prior to visit.     She is allergic to iodinated diagnostic agents and latex..    Outpatient Medications Prior to Visit   Medication Sig Dispense Refill   • albuterol sulfate HFA (PROAIR HFA) 108 (90 Base) MCG/ACT inhaler 2 puffs qid prn 8.5 g 1   • amLODIPine (NORVASC) 5 MG tablet TAKE 1 TABLET BY MOUTH DAILY. 30 tablet 11   • aspirin 81 MG tablet Take 81 mg by mouth daily.     • atorvastatin (LIPITOR) 20 MG tablet Take 1 tablet by mouth Daily. 30 tablet 11   • carvedilol (COREG) 6.25 MG tablet TAKE 1 TABLET BY MOUTH 2 (TWO) TIMES A DAY WITH MEALS. 60 tablet 11   • dicyclomine (BENTYL) 20 MG tablet Take 1 tablet by mouth 3 (Three) Times a Day As Needed (diarrhea/cramping). CAUTION diarrhea 90 tablet 5   • diphenoxylate-atropine (LOMOTIL) 2.5-0.025 MG per tablet Take 1 tablet by mouth 4 (Four) Times a Day As Needed for Diarrhea. 30 tablet 0   • donepezil (ARICEPT) 10 MG tablet TAKE 1 TABLET BY MOUTH EVERY NIGHT. 30 tablet 11   • fluticasone (FLONASE) 50 MCG/ACT nasal spray 1 spray into the nostril(s) as directed by provider 2 (Two) Times a Day. Administer 1 spray in each nostril twice daily. 1 bottle 11   • gabapentin (NEURONTIN) 800 MG tablet 1/2 in the morning and 1/2  at noon and 1 every night at bedtime 60 tablet 5   • lisinopril-hydrochlorothiazide (PRINZIDE,ZESTORETIC) 20-12.5 MG per tablet TAKE 1 TABLET BY MOUTH EVERY MORNING. FOR BLOOD PRESSURE. 30 tablet 11   • memantine (NAMENDA) 10 MG tablet TAKE 1 TABLET BY MOUTH TWICE DAILY. 60 tablet 11   • omeprazole (priLOSEC) 40 MG capsule TAKE 1 CAPSULE BY MOUTH EVERY MORNING. 30 capsule 11   • ondansetron (ZOFRAN) 4 MG tablet Take 1 tablet by mouth Every 8 (Eight) Hours As Needed for Nausea or Vomiting. 30 tablet 1   • tiZANidine (ZANAFLEX) 4 MG tablet Take 1 tablet by mouth At Night As Needed for Muscle Spasms (1 tablet at night as needed for muscle spasms). 30 tablet 0   • venlafaxine XR (EFFEXOR-XR) 150  MG 24 hr capsule Take 1 capsule by mouth Daily. 90 capsule 3   • vitamin B-12 (CYANOCOBALAMIN) 500 MCG tablet Take  by mouth. Take 1 tablet every other day.       No facility-administered medications prior to visit.       Patient Active Problem List   Diagnosis   • Vaginitis and vulvovaginitis   • Umbilical hernia without obstruction or gangrene   • Spasm of back muscles   • Osteoarthritis of knees, bilateral   • Osteoarthritis   • Class 2 severe obesity due to excess calories with serious comorbidity and body mass index (BMI) of 36.0 to 36.9 in adult (CMS/[facility])   • Mild intermittent asthma without complication   • Melena   • Megaloblastic anemia due to vitamin B12 deficiency   • Malaise and fatigue   • Lumbar radiculopathy   • Chronic bilateral low back pain with bilateral sciatica   • Impaired fasting glucose   • Mixed hyperlipidemia   • History of colon polyps   • Generalized anxiety disorder   • Generalized abdominal pain   • Gastroesophageal reflux disease   • Gastritis   • Essential hypertension   • Disorder of lumbar spine   • Disc disorder of lumbar region   • Diarrhea   • Degeneration of thoracic intervertebral disc   • Degeneration of cervical intervertebral disc   • Chronic headache disorder   • Cerebrovascular disease   • Candidiasis   • Actinic keratosis   • Early onset Alzheimer's dementia without behavioral disturbance (CMS/[facility])   • Clostridium difficile diarrhea- h/o   • Postcholecystectomy diarrhea   • Recurrent major depressive disorder, in partial remission (CMS/[facility])   • Irritable bowel syndrome with diarrhea   • Trigger thumb of right hand   • Dysfunction of right eustachian tube   • Acute conjunctivitis of left eye       Advanced Care Planning:  ACP discussion was held with the patient during this visit. Patient does not have an advance directive, information provided.    Review of Systems    Compared to one year ago, the patient feels her physical health is the same.  Compared to one year ago,  the patient feels her mental health is the same.    Reviewed chart for potential of high risk medication in the elderly: yes  Reviewed chart for potential of harmful drug interactions in the elderly:yes    Objective         Vitals:    03/12/21 1549   PainSc:   7   PainLoc: Knee     No blood pressure obtained today with this telephone visit due to coronavirus pandemic.    There is no height or weight on file to calculate BMI.  Discussed the patient's BMI with her. The BMI is above average; BMI management plan is completed.    Physical Exam          Assessment/Plan   Medicare Risks and Personalized Health Plan  CMS Preventative Services Quick Reference  Advance Directive Discussion  Dementia/Memory   Immunizations Discussed/Encouraged (specific immunizations; Pneumococcal 23 and Shingrix )  Obesity/Overweight     The above risks/problems have been discussed with the patient.  The patient is going to have her knee replacement next week.  We will reschedule her overdue appointment for May.  Fasting labs prior.  We will plan on giving the patient Pneumovax vaccine with her appointment in May.  She will be getting her second dose of COVID-19 vaccine the first week of April.  We will include hepatitis C screen with her upcoming labs.  Pertinent information has been shared with the patient in the After Visit Summary.  Follow up plans and orders are seen below in the Assessment/Plan Section.    Diagnoses and all orders for this visit:    1. Medicare annual wellness visit, subsequent (Primary)    2. Encounter for hepatitis C screening test for low risk patient  -     Hepatitis C Antibody; Future    3. Need for 23-polyvalent pneumococcal polysaccharide vaccine      Follow Up:  Return in about 1 year (around 3/12/2022) for Medicare Wellness, Next scheduled follow up - labs 1 week prior.     An After Visit Summary and PPPS were given to the patient.

## 2021-03-17 DIAGNOSIS — M50.30 DEGENERATION OF CERVICAL INTERVERTEBRAL DISC: ICD-10-CM

## 2021-03-18 RX ORDER — GABAPENTIN 800 MG/1
TABLET ORAL
Qty: 60 TABLET | Refills: 5 | Status: SHIPPED | OUTPATIENT
Start: 2021-03-18 | End: 2021-10-28 | Stop reason: SDUPTHER

## 2021-04-01 RX ORDER — TIZANIDINE 4 MG/1
4 TABLET ORAL NIGHTLY PRN
Qty: 30 TABLET | Refills: 0 | Status: SHIPPED | OUTPATIENT
Start: 2021-04-01 | End: 2021-04-26 | Stop reason: SDUPTHER

## 2021-04-12 ENCOUNTER — LAB (OUTPATIENT)
Dept: LAB | Facility: OTHER | Age: 66
End: 2021-04-12

## 2021-04-12 DIAGNOSIS — I10 ESSENTIAL HYPERTENSION: Chronic | ICD-10-CM

## 2021-04-12 DIAGNOSIS — D53.1 MEGALOBLASTIC ANEMIA DUE TO VITAMIN B12 DEFICIENCY: Chronic | ICD-10-CM

## 2021-04-12 DIAGNOSIS — Z11.59 ENCOUNTER FOR HEPATITIS C SCREENING TEST FOR LOW RISK PATIENT: ICD-10-CM

## 2021-04-12 DIAGNOSIS — E78.2 MIXED HYPERLIPIDEMIA: Chronic | ICD-10-CM

## 2021-04-12 DIAGNOSIS — R73.01 IMPAIRED FASTING GLUCOSE: Chronic | ICD-10-CM

## 2021-04-12 LAB
ALBUMIN SERPL-MCNC: 4.2 G/DL (ref 3.5–5)
ALBUMIN/GLOB SERPL: 1.4 G/DL (ref 1.1–1.8)
ALP SERPL-CCNC: 122 U/L (ref 38–126)
ALT SERPL W P-5'-P-CCNC: 12 U/L
ANION GAP SERPL CALCULATED.3IONS-SCNC: 7 MMOL/L (ref 5–15)
AST SERPL-CCNC: 20 U/L (ref 14–36)
BASOPHILS # BLD AUTO: 0.04 10*3/MM3 (ref 0–0.2)
BASOPHILS NFR BLD AUTO: 0.6 % (ref 0–1.5)
BILIRUB SERPL-MCNC: 0.5 MG/DL (ref 0.2–1.3)
BUN SERPL-MCNC: 22 MG/DL (ref 7–23)
BUN/CREAT SERPL: 21.8 (ref 7–25)
CALCIUM SPEC-SCNC: 9.8 MG/DL (ref 8.4–10.2)
CHLORIDE SERPL-SCNC: 102 MMOL/L (ref 101–112)
CHOLEST SERPL-MCNC: 171 MG/DL (ref 150–200)
CO2 SERPL-SCNC: 33 MMOL/L (ref 22–30)
CREAT SERPL-MCNC: 1.01 MG/DL (ref 0.52–1.04)
DEPRECATED RDW RBC AUTO: 45.6 FL (ref 37–54)
EOSINOPHIL # BLD AUTO: 0.97 10*3/MM3 (ref 0–0.4)
EOSINOPHIL NFR BLD AUTO: 15.2 % (ref 0.3–6.2)
ERYTHROCYTE [DISTWIDTH] IN BLOOD BY AUTOMATED COUNT: 13.9 % (ref 12.3–15.4)
GFR SERPL CREATININE-BSD FRML MDRD: 55 ML/MIN/1.73 (ref 45–104)
GLOBULIN UR ELPH-MCNC: 2.9 GM/DL (ref 2.3–3.5)
GLUCOSE SERPL-MCNC: 109 MG/DL (ref 70–99)
HCT VFR BLD AUTO: 38.9 % (ref 34–46.6)
HDLC SERPL-MCNC: 63 MG/DL (ref 40–59)
HGB BLD-MCNC: 12.3 G/DL (ref 12–15.9)
LDLC SERPL CALC-MCNC: 92 MG/DL
LDLC/HDLC SERPL: 1.43 {RATIO} (ref 0–3.22)
LYMPHOCYTES # BLD AUTO: 1.16 10*3/MM3 (ref 0.7–3.1)
LYMPHOCYTES NFR BLD AUTO: 18.1 % (ref 19.6–45.3)
MCH RBC QN AUTO: 28.9 PG (ref 26.6–33)
MCHC RBC AUTO-ENTMCNC: 31.6 G/DL (ref 31.5–35.7)
MCV RBC AUTO: 91.5 FL (ref 79–97)
MONOCYTES # BLD AUTO: 0.32 10*3/MM3 (ref 0.1–0.9)
MONOCYTES NFR BLD AUTO: 5 % (ref 5–12)
NEUTROPHILS NFR BLD AUTO: 3.91 10*3/MM3 (ref 1.7–7)
NEUTROPHILS NFR BLD AUTO: 61.1 % (ref 42.7–76)
PLATELET # BLD AUTO: 245 10*3/MM3 (ref 140–450)
PMV BLD AUTO: 11.8 FL (ref 6–12)
POTASSIUM SERPL-SCNC: 4.3 MMOL/L (ref 3.4–5)
PROT SERPL-MCNC: 7.1 G/DL (ref 6.3–8.6)
RBC # BLD AUTO: 4.25 10*6/MM3 (ref 3.77–5.28)
SODIUM SERPL-SCNC: 142 MMOL/L (ref 137–145)
TRIGL SERPL-MCNC: 90 MG/DL
VLDLC SERPL-MCNC: 16 MG/DL (ref 5–40)
WBC # BLD AUTO: 6.4 10*3/MM3 (ref 3.4–10.8)

## 2021-04-12 PROCEDURE — 86803 HEPATITIS C AB TEST: CPT | Performed by: INTERNAL MEDICINE

## 2021-04-12 PROCEDURE — 82607 VITAMIN B-12: CPT | Performed by: INTERNAL MEDICINE

## 2021-04-12 PROCEDURE — 83036 HEMOGLOBIN GLYCOSYLATED A1C: CPT | Performed by: INTERNAL MEDICINE

## 2021-04-12 PROCEDURE — 36415 COLL VENOUS BLD VENIPUNCTURE: CPT | Performed by: INTERNAL MEDICINE

## 2021-04-12 PROCEDURE — 85025 COMPLETE CBC W/AUTO DIFF WBC: CPT | Performed by: INTERNAL MEDICINE

## 2021-04-12 PROCEDURE — 80061 LIPID PANEL: CPT | Performed by: INTERNAL MEDICINE

## 2021-04-12 PROCEDURE — 80053 COMPREHEN METABOLIC PANEL: CPT | Performed by: INTERNAL MEDICINE

## 2021-04-13 LAB
HBA1C MFR BLD: 4.89 % (ref 4.8–5.6)
HCV AB SER DONR QL: NORMAL
VIT B12 BLD-MCNC: 1210 PG/ML (ref 211–946)

## 2021-04-16 ENCOUNTER — OFFICE VISIT (OUTPATIENT)
Dept: FAMILY MEDICINE CLINIC | Facility: CLINIC | Age: 66
End: 2021-04-16

## 2021-04-16 VITALS
BODY MASS INDEX: 35.73 KG/M2 | SYSTOLIC BLOOD PRESSURE: 137 MMHG | WEIGHT: 182 LBS | DIASTOLIC BLOOD PRESSURE: 70 MMHG | HEART RATE: 72 BPM | HEIGHT: 60 IN

## 2021-04-16 DIAGNOSIS — F33.41 RECURRENT MAJOR DEPRESSIVE DISORDER, IN PARTIAL REMISSION (HCC): Chronic | ICD-10-CM

## 2021-04-16 DIAGNOSIS — R73.01 IMPAIRED FASTING GLUCOSE: Chronic | ICD-10-CM

## 2021-04-16 DIAGNOSIS — F02.80 EARLY ONSET ALZHEIMER'S DEMENTIA WITHOUT BEHAVIORAL DISTURBANCE (HCC): Chronic | ICD-10-CM

## 2021-04-16 DIAGNOSIS — G30.0 EARLY ONSET ALZHEIMER'S DEMENTIA WITHOUT BEHAVIORAL DISTURBANCE (HCC): Chronic | ICD-10-CM

## 2021-04-16 DIAGNOSIS — Z23 NEED FOR 23-POLYVALENT PNEUMOCOCCAL POLYSACCHARIDE VACCINE: ICD-10-CM

## 2021-04-16 DIAGNOSIS — M54.16 LUMBAR RADICULOPATHY: Chronic | ICD-10-CM

## 2021-04-16 DIAGNOSIS — F41.1 GENERALIZED ANXIETY DISORDER: Chronic | ICD-10-CM

## 2021-04-16 DIAGNOSIS — E78.2 MIXED HYPERLIPIDEMIA: Primary | Chronic | ICD-10-CM

## 2021-04-16 DIAGNOSIS — D53.1 MEGALOBLASTIC ANEMIA DUE TO VITAMIN B12 DEFICIENCY: Chronic | ICD-10-CM

## 2021-04-16 DIAGNOSIS — E66.01 CLASS 2 SEVERE OBESITY DUE TO EXCESS CALORIES WITH SERIOUS COMORBIDITY AND BODY MASS INDEX (BMI) OF 36.0 TO 36.9 IN ADULT (HCC): Chronic | ICD-10-CM

## 2021-04-16 DIAGNOSIS — I10 ESSENTIAL HYPERTENSION: Chronic | ICD-10-CM

## 2021-04-16 PROCEDURE — 99443 PR PHYS/QHP TELEPHONE EVALUATION 21-30 MIN: CPT | Performed by: INTERNAL MEDICINE

## 2021-04-16 NOTE — PATIENT INSTRUCTIONS
Exercising to Lose Weight  Exercise is structured, repetitive physical activity to improve fitness and health. Getting regular exercise is important for everyone. It is especially important if you are overweight. Being overweight increases your risk of heart disease, stroke, diabetes, high blood pressure, and several types of cancer. Reducing your calorie intake and exercising can help you lose weight.  Exercise is usually categorized as moderate or vigorous intensity. To lose weight, most people need to do a certain amount of moderate-intensity or vigorous-intensity exercise each week.  Moderate-intensity exercise    Moderate-intensity exercise is any activity that gets you moving enough to burn at least three times more energy (calories) than if you were sitting.  Examples of moderate exercise include:  · Walking a mile in 15 minutes.  · Doing light yard work.  · Biking at an easy pace.  Most people should get at least 150 minutes (2 hours and 30 minutes) a week of moderate-intensity exercise to maintain their body weight.  Vigorous-intensity exercise  Vigorous-intensity exercise is any activity that gets you moving enough to burn at least six times more calories than if you were sitting. When you exercise at this intensity, you should be working hard enough that you are not able to carry on a conversation.  Examples of vigorous exercise include:  · Running.  · Playing a team sport, such as football, basketball, and soccer.  · Jumping rope.  Most people should get at least 75 minutes (1 hour and 15 minutes) a week of vigorous-intensity exercise to maintain their body weight.  How can exercise affect me?  When you exercise enough to burn more calories than you eat, you lose weight. Exercise also reduces body fat and builds muscle. The more muscle you have, the more calories you burn. Exercise also:  · Improves mood.  · Reduces stress and tension.  · Improves your overall fitness, flexibility, and  endurance.  · Increases bone strength.  The amount of exercise you need to lose weight depends on:  · Your age.  · The type of exercise.  · Any health conditions you have.  · Your overall physical ability.  Talk to your health care provider about how much exercise you need and what types of activities are safe for you.  What actions can I take to lose weight?  Nutrition    · Make changes to your diet as told by your health care provider or diet and nutrition specialist (dietitian). This may include:  ? Eating fewer calories.  ? Eating more protein.  ? Eating less unhealthy fats.  ? Eating a diet that includes fresh fruits and vegetables, whole grains, low-fat dairy products, and lean protein.  ? Avoiding foods with added fat, salt, and sugar.  · Drink plenty of water while you exercise to prevent dehydration or heat stroke.  Activity  · Choose an activity that you enjoy and set realistic goals. Your health care provider can help you make an exercise plan that works for you.  · Exercise at a moderate or vigorous intensity most days of the week.  ? The intensity of exercise may vary from person to person. You can tell how intense a workout is for you by paying attention to your breathing and heartbeat. Most people will notice their breathing and heartbeat get faster with more intense exercise.  · Do resistance training twice each week, such as:  ? Push-ups.  ? Sit-ups.  ? Lifting weights.  ? Using resistance bands.  · Getting short amounts of exercise can be just as helpful as long structured periods of exercise. If you have trouble finding time to exercise, try to include exercise in your daily routine.  ? Get up, stretch, and walk around every 30 minutes throughout the day.  ? Go for a walk during your lunch break.  ? Park your car farther away from your destination.  ? If you take public transportation, get off one stop early and walk the rest of the way.  ? Make phone calls while standing up and walking  around.  ? Take the stairs instead of elevators or escalators.  · Wear comfortable clothes and shoes with good support.  · Do not exercise so much that you hurt yourself, feel dizzy, or get very short of breath.  Where to find more information  · [place] Department of Health and Human Services: www.hhs.gov  · Centers for Disease Control and Prevention (CDC): www.cdc.gov  Contact a health care provider:  · Before starting a new exercise program.  · If you have questions or concerns about your weight.  · If you have a medical problem that keeps you from exercising.  Get help right away if you have any of the following while exercising:  · Injury.  · Dizziness.  · Difficulty breathing or shortness of breath that does not go away when you stop exercising.  · Chest pain.  · Rapid heartbeat.  Summary  · Being overweight increases your risk of heart disease, stroke, diabetes, high blood pressure, and several types of cancer.  · Losing weight happens when you burn more calories than you eat.  · Reducing the amount of calories you eat in addition to getting regular moderate or vigorous exercise each week helps you lose weight.  This information is not intended to replace advice given to you by your health care provider. Make sure you discuss any questions you have with your health care provider.  Document Revised: 12/31/2018 Document Reviewed: 12/31/2018  [employer] Patient Education © 2021 [employer]      Calorie Counting for Weight Loss  Calories are units of energy. Your body needs a certain amount of calories from food to keep you going throughout the day. When you eat more calories than your body needs, your body stores the extra calories as fat. When you eat fewer calories than your body needs, your body burns fat to get the energy it needs.  Calorie counting means keeping track of how many calories you eat and drink each day. Calorie counting can be helpful if you need to lose weight. If you make sure to eat fewer  calories than your body needs, you should lose weight. Ask your health care provider what a healthy weight is for you.  For calorie counting to work, you will need to eat the right number of calories in a day in order to lose a healthy amount of weight per week. A dietitian can help you determine how many calories you need in a day and will give you suggestions on how to reach your calorie goal.  · A healthy amount of weight to lose per week is usually 1-2 lb (0.5-0.9 kg). This usually means that your daily calorie intake should be reduced by 500-750 calories.  · Eating 1,200 - 1,500 calories per day can help most women lose weight.  · Eating 1,500 - 1,800 calories per day can help most men lose weight.  What is my plan?  My goal is to have __________ calories per day.  If I have this many calories per day, I should lose around __________ pounds per week.  What do I need to know about calorie counting?  In order to meet your daily calorie goal, you will need to:  · Find out how many calories are in each food you would like to eat. Try to do this before you eat.  · Decide how much of the food you plan to eat.  · Write down what you ate and how many calories it had. Doing this is called keeping a food log.  To successfully lose weight, it is important to balance calorie counting with a healthy lifestyle that includes regular activity. Aim for 150 minutes of moderate exercise (such as walking) or 75 minutes of vigorous exercise (such as running) each week.  Where do I find calorie information?    The number of calories in a food can be found on a Nutrition Facts label. If a food does not have a Nutrition Facts label, try to look up the calories online or ask your dietitian for help.  Remember that calories are listed per serving. If you choose to have more than one serving of a food, you will have to multiply the calories per serving by the amount of servings you plan to eat. For example, the label on a package of  "bread might say that a serving size is 1 slice and that there are 90 calories in a serving. If you eat 1 slice, you will have eaten 90 calories. If you eat 2 slices, you will have eaten 180 calories.  How do I keep a food log?  Immediately after each meal, record the following information in your food log:  · What you ate. Don't forget to include toppings, sauces, and other extras on the food.  · How much you ate. This can be measured in cups, ounces, or number of items.  · How many calories each food and drink had.  · The total number of calories in the meal.  Keep your food log near you, such as in a small notebook in your pocket, or use a mobile [employer] or website. Some programs will calculate calories for you and show you how many calories you have left for the day to meet your goal.  What are some calorie counting tips?    · Use your calories on foods and drinks that will fill you up and not leave you hungry:  ? Some examples of foods that fill you up are nuts and nut butters, vegetables, lean proteins, and high-fiber foods like whole grains. High-fiber foods are foods with more than 5 g fiber per serving.  ? Drinks such as sodas, specialty coffee drinks, alcohol, and juices have a lot of calories, yet do not fill you up.  · Eat nutritious foods and avoid empty calories. Empty calories are calories you get from foods or beverages that do not have many vitamins or protein, such as candy, sweets, and soda. It is better to have a nutritious high-calorie food (such as an avocado) than a food with few nutrients (such as a bag of chips).  · Know how many calories are in the foods you eat most often. This will help you calculate calorie counts faster.  · Pay attention to calories in drinks. Low-calorie drinks include water and unsweetened drinks.  · Pay attention to nutrition labels for \"low fat\" or \"fat free\" foods. These foods sometimes have the same amount of calories or more calories than the full fat versions. They " also often have added sugar, starch, or salt, to make up for flavor that was removed with the fat.  · Find a way of tracking calories that works for you. Get creative. Try different apps or programs if writing down calories does not work for you.  What are some portion control tips?  · Know how many calories are in a serving. This will help you know how many servings of a certain food you can have.  · Use a measuring cup to measure serving sizes. You could also try weighing out portions on a kitchen scale. With time, you will be able to estimate serving sizes for some foods.  · Take some time to put servings of different foods on your favorite plates, bowls, and cups so you know what a serving looks like.  · Try not to eat straight from a bag or box. Doing this can lead to overeating. Put the amount you would like to eat in a cup or on a plate to make sure you are eating the right portion.  · Use smaller plates, glasses, and bowls to prevent overeating.  · Try not to multitask (for example, watch TV or use your computer) while eating. If it is time to eat, sit down at a table and enjoy your food. This will help you to know when you are full. It will also help you to be aware of what you are eating and how much you are eating.  What are tips for following this plan?  Reading food labels  · Check the calorie count compared to the serving size. The serving size may be smaller than what you are used to eating.  · Check the source of the calories. Make sure the food you are eating is high in vitamins and protein and low in saturated and trans fats.  Shopping  · Read nutrition labels while you shop. This will help you make healthy decisions before you decide to purchase your food.  · Make a grocery list and stick to it.  Cooking  · Try to cook your favorite foods in a healthier way. For example, try baking instead of frying.  · Use low-fat dairy products.  Meal planning  · Use more fruits and vegetables. Half of your  "plate should be fruits and vegetables.  · Include lean proteins like poultry and fish.  How do I count calories when eating out?  · Ask for smaller portion sizes.  · Consider sharing an entree and sides instead of getting your own entree.  · If you get your own entree, eat only half. Ask for a box at the beginning of your meal and put the rest of your entree in it so you are not tempted to eat it.  · If calories are listed on the menu, choose the lower calorie options.  · Choose dishes that include vegetables, fruits, whole grains, low-fat dairy products, and lean protein.  · Choose items that are boiled, broiled, grilled, or steamed. Stay away from items that are buttered, battered, fried, or served with cream sauce. Items labeled \"crispy\" are usually fried, unless stated otherwise.  · Choose water, low-fat milk, unsweetened iced tea, or other drinks without added sugar. If you want an alcoholic beverage, choose a lower calorie option such as a glass of wine or light beer.  · Ask for dressings, sauces, and syrups on the side. These are usually high in calories, so you should limit the amount you eat.  · If you want a salad, choose a garden salad and ask for grilled meats. Avoid extra toppings like saleem, cheese, or fried items. Ask for the dressing on the side, or ask for olive oil and vinegar or lemon to use as dressing.  · Estimate how many servings of a food you are given. For example, a serving of cooked rice is ½ cup or about the size of half a baseball. Knowing serving sizes will help you be aware of how much food you are eating at restaurants. The list below tells you how big or small some common portion sizes are based on everyday objects:  ? 1 oz--4 stacked dice.  ? 3 oz--1 deck of cards.  ? 1 tsp--1 die.  ? 1 Tbsp--½ a ping-pong ball.  ? 2 Tbsp--1 ping-pong ball.  ? ½ cup--½ baseball.  ? 1 cup--1 baseball.  Summary  · Calorie counting means keeping track of how many calories you eat and drink each day. If " you eat fewer calories than your body needs, you should lose weight.  · A healthy amount of weight to lose per week is usually 1-2 lb (0.5-0.9 kg). This usually means reducing your daily calorie intake by 500-750 calories.  · The number of calories in a food can be found on a Nutrition Facts label. If a food does not have a Nutrition Facts label, try to look up the calories online or ask your dietitian for help.  · Use your calories on foods and drinks that will fill you up, and not on foods and drinks that will leave you hungry.  · Use smaller plates, glasses, and bowls to prevent overeating.  This information is not intended to replace advice given to you by your health care provider. Make sure you discuss any questions you have with your health care provider.  Document Revised: 09/06/2019 Document Reviewed: 11/17/2017  [employer] Patient Education © 2020 [employer]

## 2021-04-16 NOTE — PROGRESS NOTES
"You have chosen to receive care through a telephone visit. Do you consent to use a telephone visit for your medical care today? Yes    Total visit time: 32 minutes.     Chief Complaint  Follow-up (6 months )    Subjective          History of Present Illness     Andria Goldstein is our 66-year-old female who receives care today via telephone visit for 6-month follow up on hyperlipidemia, hypertension, cerebrovascular disease, early onset Alzheimer's dementia, impaired fasting glucose and vitamin B-12 deficiency among other issues.  She continues on Aricept and Namenda for Alzheimer's dementia and continues on  Effexor XR for anxiety and depression.  Patient had right total knee replacement surgery by Dr. Mcduffie, orthopedist, in [place] on 03/16/2021 with good results.    Patient is due mammogram and DEXA and agrees to schedule these.  DEXA 09/2016 revealed normal bone density.    She continues on Neurontin for chronic low back pain with bilateral sciatica.  She denies significant side effects including excessive daytime sedation causing impairment in operating a motor vehicle.      Blood pressure and heart rate at goal.  Weight is down 4 pounds in the past six months.      Cholesterol much improved with resuming Lipitor.     She has been fully vaccinated for COVID.  She is due Pneumovax 23.  She had Prevnar 13 in August 2019.     The patient's relevant past medical, surgical, and social history was reviewed in Epic.   Lab results are reviewed with the patient today. CBC unremarkable.  Fasting glucose 109. A1c 4.89.  Normal renal and liver function.  LDL 92 improved from 171. Vitamin B-12 at goal with oral B-12      Objective   Vital Signs:   /70   Pulse 72   Ht 152.4 cm (60\")   Wt 82.6 kg (182 lb)   BMI 35.54 kg/m²         Physical Exam   Result Review :     CMP    CMP 8/20/20 4/12/21   Glucose 95 109 (A)   BUN 18 22   Creatinine 0.95 1.01   eGFR Non African Am 59 55   Sodium 141 142   Potassium " 4.5 4.3   Chloride 103 102   Calcium 10.2 9.8   Albumin 4.30 4.20   Total Bilirubin 0.6 0.5   Alkaline Phosphatase 112 122   AST (SGOT) 30 20   ALT (SGPT) 21 12   (A) Abnormal value            CBC w/diff    CBC w/Diff 8/20/20 4/12/21   WBC 7.43 6.40   RBC 4.74 4.25   Hemoglobin 13.5 12.3   Hematocrit 41.8 38.9   MCV 88.2 91.5   MCH 28.5 28.9   MCHC 32.3 31.6   RDW 14.4 13.9   Platelets 210 245   Neutrophil Rel % 64.2 61.1   Lymphocyte Rel % 22.6 18.1 (A)   Monocyte Rel % 6.5 5.0   Eosinophil Rel % 6.3 (A) 15.2 (A)   Basophil Rel % 0.4 0.6   (A) Abnormal value            Lipid Panel    Lipid Panel 8/20/20 8/20/20 4/12/21    1410 1410    Total Cholesterol   171   Triglycerides 181 (A)  90   HDL Cholesterol   63 (A)   VLDL Cholesterol   16   LDL Cholesterol   171 (A) 92   LDL/HDL Ratio   1.43   (A) Abnormal value            TSH    TSH 8/20/20   TSH 2.060           A1C Last 3 Results    HGBA1C Last 3 Results 8/20/20 4/12/21   Hemoglobin A1C 5.80 (A) 4.89   (A) Abnormal value            Data reviewed: Radiologic studies DEXA 09/2016 and mammogram 02/2019         Assessment and Plan    Diagnoses and all orders for this visit:    1. Mixed hyperlipidemia (Primary)  -     CBC Auto Differential; Future  -     Comprehensive Metabolic Panel; Future  -     LDL Cholesterol, Direct; Future  -     TSH; Future    2. Essential hypertension  -     Comprehensive Metabolic Panel; Future    3. Impaired fasting glucose  -     Comprehensive Metabolic Panel; Future    4. Class 2 severe obesity due to excess calories with serious comorbidity and body mass index (BMI) of 36.0 to 36.9 in adult (CMS/HCC)  -     TSH; Future    5. Megaloblastic anemia due to vitamin B12 deficiency  -     Vitamin B12; Future    6. Early onset Alzheimer's dementia without behavioral disturbance (CMS/HCC)    7. Generalized anxiety disorder    8. Recurrent major depressive disorder, in partial remission (CMS/HCC)    9. Lumbar radiculopathy    10. Need for  23-polyvalent pneumococcal polysaccharide vaccine           No medication changes needed today.      Orders are placed for patient to schedule DEXA and mammogram.  She agrees to stop by the office for Pneumovax when she is in the clinic for DEXA and mammogram.     Cholesterol vastly improved.  Continue  Lipitor to 20 mg q.d.  Recommended low fat, low cholesterol diet and weight loss.     Continue the Aricept and Namenda for Alzheimer's dementia and Effexor XR for depression and anxiety, all of which seems stable.  Walking for exercise should be easier now that she has had the right knee replacement.     Continue omeprazole for GERD, which is stable.      Continue Neurontin for chronic low back pain with bilateral sciatica.      Continue the current blood pressure medications.  Pursue sodium restriction and weight loss.     Continue the principles of a diabetic style diet and weight loss plan to delay progression of impaired fasting glucose.    Continue other medications and vitamin and mineral supplements to treat additional medical problems which we addressed today.  Return in six months for follow up with fasting labs one week prior.     Scribed for Dr. Mckeon by Cesilia Gutierrez [facility].       Follow Up   Return in about 6 months (around 10/16/2021) for Follow up in six months with labs one week prior..  Patient was given instructions and counseling regarding her condition or for health maintenance advice. Please see specific information pulled into the AVS if appropriate.

## 2021-04-26 RX ORDER — TIZANIDINE 4 MG/1
4 TABLET ORAL NIGHTLY PRN
Qty: 30 TABLET | Refills: 0 | Status: SHIPPED | OUTPATIENT
Start: 2021-04-26 | End: 2021-06-08 | Stop reason: SDUPTHER

## 2021-04-27 RX ORDER — VENLAFAXINE HYDROCHLORIDE 150 MG/1
150 CAPSULE, EXTENDED RELEASE ORAL DAILY
Qty: 90 CAPSULE | Refills: 3 | Status: SHIPPED | OUTPATIENT
Start: 2021-04-27 | End: 2022-02-15

## 2021-04-30 RX ORDER — OMEPRAZOLE 40 MG/1
40 CAPSULE, DELAYED RELEASE ORAL EVERY MORNING
Qty: 30 CAPSULE | Refills: 11 | Status: SHIPPED | OUTPATIENT
Start: 2021-04-30 | End: 2021-05-27 | Stop reason: SDUPTHER

## 2021-05-27 RX ORDER — OMEPRAZOLE 40 MG/1
40 CAPSULE, DELAYED RELEASE ORAL EVERY MORNING
Qty: 30 CAPSULE | Refills: 11 | Status: SHIPPED | OUTPATIENT
Start: 2021-05-27 | End: 2022-05-17

## 2021-06-08 RX ORDER — TIZANIDINE 4 MG/1
4 TABLET ORAL NIGHTLY PRN
Qty: 30 TABLET | Refills: 0 | Status: SHIPPED | OUTPATIENT
Start: 2021-06-08 | End: 2021-07-08 | Stop reason: SDUPTHER

## 2021-07-08 RX ORDER — TIZANIDINE 4 MG/1
4 TABLET ORAL NIGHTLY PRN
Qty: 30 TABLET | Refills: 0 | Status: SHIPPED | OUTPATIENT
Start: 2021-07-08 | End: 2021-10-13

## 2021-08-25 RX ORDER — ATORVASTATIN CALCIUM 20 MG/1
20 TABLET, FILM COATED ORAL DAILY
Qty: 30 TABLET | Refills: 11 | Status: SHIPPED | OUTPATIENT
Start: 2021-08-25 | End: 2022-09-21

## 2021-09-01 ENCOUNTER — OFFICE VISIT (OUTPATIENT)
Dept: FAMILY MEDICINE CLINIC | Facility: CLINIC | Age: 66
End: 2021-09-01

## 2021-09-01 ENCOUNTER — LAB (OUTPATIENT)
Dept: LAB | Facility: OTHER | Age: 66
End: 2021-09-01

## 2021-09-01 VITALS — BODY MASS INDEX: 35.73 KG/M2 | HEIGHT: 60 IN | WEIGHT: 182 LBS

## 2021-09-01 DIAGNOSIS — J06.9 VIRAL URI WITH COUGH: ICD-10-CM

## 2021-09-01 DIAGNOSIS — E66.01 CLASS 2 SEVERE OBESITY DUE TO EXCESS CALORIES WITH SERIOUS COMORBIDITY AND BODY MASS INDEX (BMI) OF 36.0 TO 36.9 IN ADULT (HCC): Chronic | ICD-10-CM

## 2021-09-01 DIAGNOSIS — J45.21 MILD INTERMITTENT ASTHMA WITH ACUTE EXACERBATION: ICD-10-CM

## 2021-09-01 DIAGNOSIS — G30.0 EARLY ONSET ALZHEIMER'S DEMENTIA WITHOUT BEHAVIORAL DISTURBANCE (HCC): Chronic | ICD-10-CM

## 2021-09-01 DIAGNOSIS — F02.80 EARLY ONSET ALZHEIMER'S DEMENTIA WITHOUT BEHAVIORAL DISTURBANCE (HCC): Chronic | ICD-10-CM

## 2021-09-01 DIAGNOSIS — J06.9 VIRAL URI WITH COUGH: Primary | ICD-10-CM

## 2021-09-01 DIAGNOSIS — R07.81 RIB PAIN ON LEFT SIDE: ICD-10-CM

## 2021-09-01 DIAGNOSIS — R73.01 IMPAIRED FASTING GLUCOSE: Chronic | ICD-10-CM

## 2021-09-01 PROCEDURE — 87635 SARS-COV-2 COVID-19 AMP PRB: CPT | Performed by: INTERNAL MEDICINE

## 2021-09-01 PROCEDURE — 99443 PR PHYS/QHP TELEPHONE EVALUATION 21-30 MIN: CPT | Performed by: INTERNAL MEDICINE

## 2021-09-01 RX ORDER — HYDROCODONE BITARTRATE AND ACETAMINOPHEN 5; 325 MG/1; MG/1
TABLET ORAL
COMMUNITY
Start: 2021-07-15 | End: 2021-09-01

## 2021-09-01 RX ORDER — BENZONATATE 200 MG/1
200 CAPSULE ORAL 3 TIMES DAILY PRN
Qty: 30 CAPSULE | Refills: 0 | Status: SHIPPED | OUTPATIENT
Start: 2021-09-01 | End: 2021-11-16

## 2021-09-01 RX ORDER — ALBUTEROL SULFATE 90 UG/1
2 AEROSOL, METERED RESPIRATORY (INHALATION) EVERY 4 HOURS PRN
Qty: 18 G | Refills: 0 | Status: SHIPPED | OUTPATIENT
Start: 2021-09-01 | End: 2023-01-16 | Stop reason: SDUPTHER

## 2021-09-01 RX ORDER — MONTELUKAST SODIUM 10 MG/1
10 TABLET ORAL NIGHTLY
Qty: 30 TABLET | Refills: 1 | Status: SHIPPED | OUTPATIENT
Start: 2021-09-01 | End: 2021-11-29

## 2021-09-01 RX ORDER — PREDNISONE 10 MG/1
TABLET ORAL
Qty: 15 TABLET | Refills: 0 | Status: SHIPPED | OUTPATIENT
Start: 2021-09-01 | End: 2021-10-14

## 2021-09-01 NOTE — PROGRESS NOTES
"You have chosen to receive care through a telephone visit. Do you consent to use a telephone visit for your medical care today? Yes    Total visit time: 22 minutes.     Chief Complaint  Chest Pain (rib pain on right side x 3 days. She states has been coughing a lot x 4-5 days. Dry hacky cough . Denies SOB . C/O pain upon movement ) and Earache (right )    Subjective          History of Present Illness     Andria Goldstein receives care via telephone visit reporting left-sided anterior rib pain for the past three days, which she feels is due to the frequent cough, which she has had for the past 4-5 days, which was initially productive of clear sputum, but mostly nonproductive currently and right-sided earache, and increased fatigue. The cough keeps her awake at night.   Denies shortness of breath. Denies fever or chills.  Sense of taste has been altered. Denies loss of sense of smell.  She has been fully vaccinated for COVID with her 2nd vaccine on 04/07/2021. She is taking OTC cough suppressant she takes twice daily.  She is out of her inhaler.    Her impaired fasting glucose had not progressed in April with A1c 4.89.  Her weight is still far above goal.  BMI is 35.    The patient has Alzheimer's dementia, early onset.  I asked her to write down the instructions that we provided today, including the phone number to call the lab when she gets here for her Covid test.  She is instructed to remain in the car at the appropriate site.  Unfortunately, her  is not at home currently, but will be back soon.  I was not able to repeat these instructions to anyone else in her home.             Objective   Vital Signs:   Ht 152.4 cm (60\")   Wt 82.6 kg (182 lb)   BMI 35.54 kg/m²       Physical Exam   Result Review :     A1C Last 3 Results    HGBA1C Last 3 Results 4/12/21   Hemoglobin A1C 4.89                  Future Appointments   Date Time Provider Department Center   10/14/2021  9:30 AM Christofer Mckeon MD [facility] " [facility]         Assessment and Plan    Diagnoses and all orders for this visit:    1. Viral URI with cough (Primary)  -     COVID-19,  [facility] IN-HOUSE, NP SWAB IN TRANSPORT MEDIA 8-10 HR TAT - Swab, Anterior nasal; Future    2. Mild intermittent asthma with acute exacerbation  -     COVID-19,  [facility] IN-HOUSE, NP SWAB IN TRANSPORT MEDIA 8-10 HR TAT - Swab, Anterior nasal; Future    3. Rib pain on left side    4. Early onset Alzheimer's dementia without behavioral disturbance (CMS/[facility])    5. Impaired fasting glucose    6. Class 2 severe obesity due to excess calories with serious comorbidity and body mass index (BMI) of 36.0 to 36.9 in adult (CMS/[facility])    Other orders  -     benzonatate (TESSALON) 200 MG capsule; Take 1 capsule by mouth 3 (Three) Times a Day As Needed for Cough for up to 30 doses.  Dispense: 30 capsule; Refill: 0  -     albuterol sulfate  (90 Base) MCG/ACT inhaler; Inhale 2 puffs Every 4 (Four) Hours As Needed for Wheezing (Cough).  Dispense: 18 g; Refill: 0  -     montelukast (SINGULAIR) 10 MG tablet; Take 1 tablet by mouth Every Night.  Dispense: 30 tablet; Refill: 1  -     predniSONE (DELTASONE) 10 MG tablet; 1 by mouth 3 times a day times 3 days, then 1 by mouth twice a day times 3 days  Dispense: 15 tablet; Refill: 0           An order is placed for patient to come to the rear entrance of the clinic for COVID testing.  We will notify patient with results.  I sent prescriptions for albuterol inhaler to use 2 puffs Every 4 (Four) hours as needed for wheezing/coughas directed, prednisone 10 mg taper to take as directed, Singulair 10 mg q.h.s., and Tessalon Perles 200 mg to take one t.i.d. for cough.     Continue the Aricept and Namenda and Effexor XR for dementia and related depression/anxiety.  This issue seems to be stable.    We continue to encourage a diabetic style diet to slow progression of IFG and help address her obesity.     Return 10/14/2021 for routine follow up with fasting  labs one week prior or sooner if needed.     Scribed for Dr. Mckeon by Cesilia Gutierrez [facility].     Follow Up   Return if symptoms worsen or fail to improve, for Next scheduled follow up.  Patient was given instructions and counseling regarding her condition or for health maintenance advice. Please see specific information pulled into the AVS if appropriate.

## 2021-09-02 LAB — SARS-COV-2 N GENE RESP QL NAA+PROBE: NOT DETECTED

## 2021-09-16 DIAGNOSIS — F02.80 EARLY ONSET ALZHEIMER'S DEMENTIA WITHOUT BEHAVIORAL DISTURBANCE (HCC): Primary | ICD-10-CM

## 2021-09-16 DIAGNOSIS — G30.0 EARLY ONSET ALZHEIMER'S DEMENTIA WITHOUT BEHAVIORAL DISTURBANCE (HCC): Primary | ICD-10-CM

## 2021-09-16 RX ORDER — MEMANTINE HYDROCHLORIDE 10 MG/1
TABLET ORAL
Qty: 60 TABLET | Refills: 11 | Status: SHIPPED | OUTPATIENT
Start: 2021-09-16 | End: 2022-10-07

## 2021-09-16 RX ORDER — DONEPEZIL HYDROCHLORIDE 10 MG/1
10 TABLET, FILM COATED ORAL NIGHTLY
Qty: 30 TABLET | Refills: 11 | Status: SHIPPED | OUTPATIENT
Start: 2021-09-16 | End: 2022-09-21

## 2021-09-24 RX ORDER — AMLODIPINE BESYLATE 5 MG/1
5 TABLET ORAL DAILY
Qty: 30 TABLET | Refills: 11 | Status: SHIPPED | OUTPATIENT
Start: 2021-09-24 | End: 2022-10-05

## 2021-10-11 ENCOUNTER — LAB (OUTPATIENT)
Dept: LAB | Facility: OTHER | Age: 66
End: 2021-10-11

## 2021-10-11 DIAGNOSIS — D53.1 MEGALOBLASTIC ANEMIA DUE TO VITAMIN B12 DEFICIENCY: Chronic | ICD-10-CM

## 2021-10-11 DIAGNOSIS — I10 ESSENTIAL HYPERTENSION: Chronic | ICD-10-CM

## 2021-10-11 DIAGNOSIS — E78.2 MIXED HYPERLIPIDEMIA: Chronic | ICD-10-CM

## 2021-10-11 DIAGNOSIS — E66.01 CLASS 2 SEVERE OBESITY DUE TO EXCESS CALORIES WITH SERIOUS COMORBIDITY AND BODY MASS INDEX (BMI) OF 36.0 TO 36.9 IN ADULT (HCC): Chronic | ICD-10-CM

## 2021-10-11 DIAGNOSIS — R73.01 IMPAIRED FASTING GLUCOSE: Chronic | ICD-10-CM

## 2021-10-11 LAB
ALBUMIN SERPL-MCNC: 4.1 G/DL (ref 3.5–5)
ALBUMIN/GLOB SERPL: 1.6 G/DL (ref 1.1–1.8)
ALP SERPL-CCNC: 109 U/L (ref 38–126)
ALT SERPL W P-5'-P-CCNC: 15 U/L
ANION GAP SERPL CALCULATED.3IONS-SCNC: 4 MMOL/L (ref 5–15)
AST SERPL-CCNC: 22 U/L (ref 14–36)
BASOPHILS # BLD AUTO: 0.02 10*3/MM3 (ref 0–0.2)
BASOPHILS NFR BLD AUTO: 0.4 % (ref 0–1.5)
BILIRUB SERPL-MCNC: 0.5 MG/DL (ref 0.2–1.3)
BUN SERPL-MCNC: 16 MG/DL (ref 7–23)
BUN/CREAT SERPL: 20.3 (ref 7–25)
CALCIUM SPEC-SCNC: 9.5 MG/DL (ref 8.4–10.2)
CHLORIDE SERPL-SCNC: 105 MMOL/L (ref 101–112)
CO2 SERPL-SCNC: 31 MMOL/L (ref 22–30)
CREAT SERPL-MCNC: 0.79 MG/DL (ref 0.52–1.04)
DEPRECATED RDW RBC AUTO: 45.1 FL (ref 37–54)
EOSINOPHIL # BLD AUTO: 0.19 10*3/MM3 (ref 0–0.4)
EOSINOPHIL NFR BLD AUTO: 3.4 % (ref 0.3–6.2)
ERYTHROCYTE [DISTWIDTH] IN BLOOD BY AUTOMATED COUNT: 14.2 % (ref 12.3–15.4)
GFR SERPL CREATININE-BSD FRML MDRD: 73 ML/MIN/1.73 (ref 45–104)
GLOBULIN UR ELPH-MCNC: 2.6 GM/DL (ref 2.3–3.5)
GLUCOSE SERPL-MCNC: 102 MG/DL (ref 70–99)
HCT VFR BLD AUTO: 40.7 % (ref 34–46.6)
HGB BLD-MCNC: 13 G/DL (ref 12–15.9)
LYMPHOCYTES # BLD AUTO: 1.16 10*3/MM3 (ref 0.7–3.1)
LYMPHOCYTES NFR BLD AUTO: 20.6 % (ref 19.6–45.3)
MCH RBC QN AUTO: 28.2 PG (ref 26.6–33)
MCHC RBC AUTO-ENTMCNC: 31.9 G/DL (ref 31.5–35.7)
MCV RBC AUTO: 88.3 FL (ref 79–97)
MONOCYTES # BLD AUTO: 0.36 10*3/MM3 (ref 0.1–0.9)
MONOCYTES NFR BLD AUTO: 6.4 % (ref 5–12)
NEUTROPHILS NFR BLD AUTO: 3.9 10*3/MM3 (ref 1.7–7)
NEUTROPHILS NFR BLD AUTO: 69.2 % (ref 42.7–76)
PLATELET # BLD AUTO: 211 10*3/MM3 (ref 140–450)
PMV BLD AUTO: 11.9 FL (ref 6–12)
POTASSIUM SERPL-SCNC: 4 MMOL/L (ref 3.4–5)
PROT SERPL-MCNC: 6.7 G/DL (ref 6.3–8.6)
RBC # BLD AUTO: 4.61 10*6/MM3 (ref 3.77–5.28)
SODIUM SERPL-SCNC: 140 MMOL/L (ref 137–145)
WBC # BLD AUTO: 5.63 10*3/MM3 (ref 3.4–10.8)

## 2021-10-11 PROCEDURE — 83721 ASSAY OF BLOOD LIPOPROTEIN: CPT | Performed by: INTERNAL MEDICINE

## 2021-10-11 PROCEDURE — 84443 ASSAY THYROID STIM HORMONE: CPT | Performed by: INTERNAL MEDICINE

## 2021-10-11 PROCEDURE — 85025 COMPLETE CBC W/AUTO DIFF WBC: CPT | Performed by: INTERNAL MEDICINE

## 2021-10-11 PROCEDURE — 80053 COMPREHEN METABOLIC PANEL: CPT | Performed by: INTERNAL MEDICINE

## 2021-10-11 PROCEDURE — 82607 VITAMIN B-12: CPT | Performed by: INTERNAL MEDICINE

## 2021-10-11 PROCEDURE — 36415 COLL VENOUS BLD VENIPUNCTURE: CPT | Performed by: INTERNAL MEDICINE

## 2021-10-11 NOTE — PROGRESS NOTES
"You have chosen to receive care through a telephone visit. Do you consent to use a telephone visit for your medical care today? Yes    Chief Complaint  Hypertension, Hyperlipidemia, Impaired fasting glucose, Anemia, Osteoarthritis, Alzheimer's Disease, Anxiety, and Depression    Subjective          History of Present Illness     Andria Goldstein is our 66-year-old who receives care today via telephone visit for 6-month follow up on hyperlipidemia, hypertension, cerebrovascular disease, early onset Alzheimer's dementia, impaired fasting glucose and vitamin B-12 deficiency among other issues.  She continues on Aricept and Namenda for Alzheimer's dementia and continues on  Effexor XR for anxiety and depression.      Andria right total knee replacement surgery by Dr. Mcduffie, orthopedist, in [place] on 03/16/2021 with excellent results.  She had left TKR 2 years earlier.  She continues on Neurontin for chronic low back pain with bilateral sciatica.    We treated for viral URI symptoms last month.  COVID testing was negative. She has been fully vaccinated for COVID with her 2nd vaccine on 04/07/2021.    Patient is due DEXA and mammogram.  DEXA 09/2016 revealed normal bone density.    She and her  feel that her dementia symptoms and depression issues are stable with current medical therapy    She was unable to check her blood pressure at home today during this televisit.    Cholesterol continues to improve with resuming Lipitor.      The patient's relevant past medical, surgical, and social history was reviewed in Epic.   Lab results are reviewed with the patient today.  CBC unremarkable. Fasting glucose 102.  Normal renal and liver function.  Normal thyroid screen.  B-12 at goal with oral B-12.  LDL continues to trend down at 84 with Lipitor.       Objective   Vital Signs:    Ht 152.4 cm (60\")   Wt 83.5 kg (184 lb)   BMI 35.94 kg/m²       Physical Exam   Result Review :     CMP    CMP 4/12/21 10/11/21 "   Glucose 109 (A) 102 (A)   BUN 22 16   Creatinine 1.01 0.79   eGFR Non African Am 55 73   Sodium 142 140   Potassium 4.3 4.0   Chloride 102 105   Calcium 9.8 9.5   Albumin 4.20 4.10   Total Bilirubin 0.5 0.5   Alkaline Phosphatase 122 109   AST (SGOT) 20 22   ALT (SGPT) 12 15   (A) Abnormal value            CBC w/diff    CBC w/Diff 4/12/21 10/11/21   WBC 6.40 5.63   RBC 4.25 4.61   Hemoglobin 12.3 13.0   Hematocrit 38.9 40.7   MCV 91.5 88.3   MCH 28.9 28.2   MCHC 31.6 31.9   RDW 13.9 14.2   Platelets 245 211   Neutrophil Rel % 61.1 69.2   Lymphocyte Rel % 18.1 (A) 20.6   Monocyte Rel % 5.0 6.4   Eosinophil Rel % 15.2 (A) 3.4   Basophil Rel % 0.6 0.4   (A) Abnormal value            Lipid Panel    Lipid Panel 4/12/21 10/11/21   Total Cholesterol 171    Triglycerides 90    HDL Cholesterol 63 (A)    VLDL Cholesterol 16    LDL Cholesterol  92 84   LDL/HDL Ratio 1.43    (A) Abnormal value            TSH    TSH 10/11/21   TSH 0.667           A1C Last 3 Results    HGBA1C Last 3 Results 4/12/21   Hemoglobin A1C 4.89           Data reviewed: Radiologic studies DEXA 09/2016          Assessment and Plan    Diagnoses and all orders for this visit:    1. Essential hypertension (Primary)  -     CBC Auto Differential; Future  -     Comprehensive Metabolic Panel; Future    2. Mixed hyperlipidemia  -     Lipid Panel; Future    3. Impaired fasting glucose  -     Comprehensive Metabolic Panel; Future  -     Hemoglobin A1c; Future    4. Class 2 severe obesity due to excess calories with serious comorbidity and body mass index (BMI) of 36.0 to 36.9 in adult (HCC)    5. Megaloblastic anemia due to vitamin B12 deficiency  -     CBC Auto Differential; Future  -     Vitamin B12; Future    6. Recurrent major depressive disorder, in partial remission (HCC)    7. Early onset Alzheimer's dementia without behavioral disturbance (HCC)    8. Breast cancer screening by mammogram  -     Mammo Screening Digital Tomosynthesis Bilateral With CAD;  Future    9. Menopause  -     DEXA Bone Density Axial; Future    Continue current hypertension management and meds.  She is stable.    Continue current dose of Lipitor and dietary efforts.    Continue to manage the IFG with a diabetic style diet and weight loss efforts.  We will continue to monitor.    Continue the vitamin B12 supplement.  B12 levels are at goal and hemoglobin is stable.    Continue the current doses of Aricept and Namenda and checks her XR.  Depression and anxiety and dementia issues are all stable according to the patient and .    Schedule mammogram and repeat DEXA.  Continue the calcium/vitamin D supplements and pursue daily walking for weightbearing exercise to strengthen the bones.    Return to clinic in 6 months with fasting labs prior    I spent 30 minutes caring for Andria on this date of service. This time includes time spent by me in the following activities:preparing for the visit, reviewing tests, obtaining and/or reviewing a separately obtained history, counseling and educating the patient/family/caregiver, ordering medications, tests, or procedures, documenting information in the medical record and independently interpreting results and communicating that information with the patient/family/caregiver         Follow Up   Return in about 6 months (around 4/14/2022) for Next scheduled follow up - labs 1 week prior.  Patient was given instructions and counseling regarding her condition or for health maintenance advice. Please see specific information pulled into the AVS if appropriate.

## 2021-10-12 LAB
ARTICHOKE IGE QN: 84 MG/DL (ref 0–100)
TSH SERPL DL<=0.05 MIU/L-ACNC: 0.67 UIU/ML (ref 0.27–4.2)
VIT B12 BLD-MCNC: 1226 PG/ML (ref 211–946)

## 2021-10-13 RX ORDER — TIZANIDINE 4 MG/1
TABLET ORAL
Qty: 30 TABLET | Refills: 0 | Status: SHIPPED | OUTPATIENT
Start: 2021-10-13 | End: 2021-11-10

## 2021-10-14 ENCOUNTER — OFFICE VISIT (OUTPATIENT)
Dept: FAMILY MEDICINE CLINIC | Facility: CLINIC | Age: 66
End: 2021-10-14

## 2021-10-14 VITALS — BODY MASS INDEX: 36.12 KG/M2 | WEIGHT: 184 LBS | HEIGHT: 60 IN

## 2021-10-14 DIAGNOSIS — I10 ESSENTIAL HYPERTENSION: Primary | Chronic | ICD-10-CM

## 2021-10-14 DIAGNOSIS — F02.80 EARLY ONSET ALZHEIMER'S DEMENTIA WITHOUT BEHAVIORAL DISTURBANCE (HCC): Chronic | ICD-10-CM

## 2021-10-14 DIAGNOSIS — E78.2 MIXED HYPERLIPIDEMIA: Chronic | ICD-10-CM

## 2021-10-14 DIAGNOSIS — Z12.31 BREAST CANCER SCREENING BY MAMMOGRAM: ICD-10-CM

## 2021-10-14 DIAGNOSIS — F33.41 RECURRENT MAJOR DEPRESSIVE DISORDER, IN PARTIAL REMISSION (HCC): Chronic | ICD-10-CM

## 2021-10-14 DIAGNOSIS — G30.0 EARLY ONSET ALZHEIMER'S DEMENTIA WITHOUT BEHAVIORAL DISTURBANCE (HCC): Chronic | ICD-10-CM

## 2021-10-14 DIAGNOSIS — Z78.0 MENOPAUSE: ICD-10-CM

## 2021-10-14 DIAGNOSIS — E66.01 CLASS 2 SEVERE OBESITY DUE TO EXCESS CALORIES WITH SERIOUS COMORBIDITY AND BODY MASS INDEX (BMI) OF 36.0 TO 36.9 IN ADULT (HCC): Chronic | ICD-10-CM

## 2021-10-14 DIAGNOSIS — R73.01 IMPAIRED FASTING GLUCOSE: Chronic | ICD-10-CM

## 2021-10-14 DIAGNOSIS — D53.1 MEGALOBLASTIC ANEMIA DUE TO VITAMIN B12 DEFICIENCY: Chronic | ICD-10-CM

## 2021-10-14 PROBLEM — G51.0 BELL'S PALSY: Status: ACTIVE | Noted: 2021-10-14

## 2021-10-14 PROCEDURE — 99443 PR PHYS/QHP TELEPHONE EVALUATION 21-30 MIN: CPT | Performed by: INTERNAL MEDICINE

## 2021-10-14 NOTE — PATIENT INSTRUCTIONS

## 2021-10-28 DIAGNOSIS — M50.30 DEGENERATION OF CERVICAL INTERVERTEBRAL DISC: ICD-10-CM

## 2021-10-28 RX ORDER — GABAPENTIN 800 MG/1
TABLET ORAL
Qty: 60 TABLET | Refills: 5 | Status: SHIPPED | OUTPATIENT
Start: 2021-10-28 | End: 2022-04-18 | Stop reason: SDUPTHER

## 2021-11-10 RX ORDER — TIZANIDINE 4 MG/1
TABLET ORAL
Qty: 30 TABLET | Refills: 5 | Status: SHIPPED | OUTPATIENT
Start: 2021-11-10 | End: 2022-05-11 | Stop reason: SDUPTHER

## 2021-11-16 ENCOUNTER — LAB (OUTPATIENT)
Dept: LAB | Facility: OTHER | Age: 66
End: 2021-11-16

## 2021-11-16 ENCOUNTER — OFFICE VISIT (OUTPATIENT)
Dept: FAMILY MEDICINE CLINIC | Facility: CLINIC | Age: 66
End: 2021-11-16

## 2021-11-16 VITALS
TEMPERATURE: 97.1 F | WEIGHT: 178 LBS | BODY MASS INDEX: 34.95 KG/M2 | SYSTOLIC BLOOD PRESSURE: 128 MMHG | HEART RATE: 60 BPM | DIASTOLIC BLOOD PRESSURE: 70 MMHG | HEIGHT: 60 IN

## 2021-11-16 DIAGNOSIS — M65.312 TRIGGER THUMB OF LEFT HAND: ICD-10-CM

## 2021-11-16 DIAGNOSIS — Z23 NEED FOR IMMUNIZATION AGAINST INFLUENZA: ICD-10-CM

## 2021-11-16 DIAGNOSIS — R73.01 IMPAIRED FASTING GLUCOSE: Chronic | ICD-10-CM

## 2021-11-16 DIAGNOSIS — R30.0 DYSURIA: Primary | ICD-10-CM

## 2021-11-16 DIAGNOSIS — Z23 FLU VACCINE NEED: ICD-10-CM

## 2021-11-16 DIAGNOSIS — N39.41 URGE INCONTINENCE OF URINE: ICD-10-CM

## 2021-11-16 DIAGNOSIS — I10 ESSENTIAL HYPERTENSION: Chronic | ICD-10-CM

## 2021-11-16 DIAGNOSIS — G30.0 EARLY ONSET ALZHEIMER'S DEMENTIA WITHOUT BEHAVIORAL DISTURBANCE (HCC): Chronic | ICD-10-CM

## 2021-11-16 DIAGNOSIS — F02.80 EARLY ONSET ALZHEIMER'S DEMENTIA WITHOUT BEHAVIORAL DISTURBANCE (HCC): Chronic | ICD-10-CM

## 2021-11-16 DIAGNOSIS — E66.09 CLASS 1 OBESITY DUE TO EXCESS CALORIES WITH SERIOUS COMORBIDITY AND BODY MASS INDEX (BMI) OF 34.0 TO 34.9 IN ADULT: Chronic | ICD-10-CM

## 2021-11-16 DIAGNOSIS — N30.00 ACUTE CYSTITIS WITHOUT HEMATURIA: Primary | ICD-10-CM

## 2021-11-16 LAB
BACTERIA UR QL AUTO: ABNORMAL /HPF
BILIRUB UR QL STRIP: NEGATIVE
CLARITY UR: ABNORMAL
COLOR UR: YELLOW
GLUCOSE UR STRIP-MCNC: NEGATIVE MG/DL
HGB UR QL STRIP.AUTO: ABNORMAL
HYALINE CASTS UR QL AUTO: ABNORMAL /LPF
KETONES UR QL STRIP: NEGATIVE
LEUKOCYTE ESTERASE UR QL STRIP.AUTO: NEGATIVE
MUCOUS THREADS URNS QL MICRO: ABNORMAL /HPF
NITRITE UR QL STRIP: NEGATIVE
PH UR STRIP.AUTO: 6 [PH] (ref 5.5–8)
PROT UR QL STRIP: ABNORMAL
RBC # UR: ABNORMAL /HPF
REF LAB TEST METHOD: ABNORMAL
SP GR UR STRIP: >=1.03 (ref 1–1.03)
SQUAMOUS #/AREA URNS HPF: ABNORMAL /HPF
UROBILINOGEN UR QL STRIP: ABNORMAL
WBC UR QL AUTO: ABNORMAL /HPF

## 2021-11-16 PROCEDURE — G0008 ADMIN INFLUENZA VIRUS VAC: HCPCS | Performed by: INTERNAL MEDICINE

## 2021-11-16 PROCEDURE — 81001 URINALYSIS AUTO W/SCOPE: CPT | Performed by: INTERNAL MEDICINE

## 2021-11-16 PROCEDURE — 90662 IIV NO PRSV INCREASED AG IM: CPT | Performed by: INTERNAL MEDICINE

## 2021-11-16 PROCEDURE — 99214 OFFICE O/P EST MOD 30 MIN: CPT | Performed by: INTERNAL MEDICINE

## 2021-11-16 PROCEDURE — 87086 URINE CULTURE/COLONY COUNT: CPT | Performed by: INTERNAL MEDICINE

## 2021-11-16 RX ORDER — CEFUROXIME AXETIL 500 MG/1
500 TABLET ORAL
Qty: 10 TABLET | Refills: 0 | Status: SHIPPED | OUTPATIENT
Start: 2021-11-16 | End: 2021-11-21

## 2021-11-16 NOTE — PROGRESS NOTES
"Chief Complaint  Urinary Frequency (x 1 week ) and Urine Leakage    Subjective          History of Present Illness     Andria Goldstein is our 66-year-old patient with Alzheimer's dementia among other issues who presents to the office accompanied by her  today reporting urinary frequency and difficulty with bladder control for the past week.  Denies visualizing blood in the urine.  Patient drinks an occasional Sprite, for which I asked her to avoid sugar sweetened beverages, not only which can aggravate UTI symptoms, but also raise glucose in the patient with impaired fasting glucose.  We checked UA prior to her visit today revealing 6-12 WBCs, 3-5 RBCs, and trace of bacteria consistent with UTI.   Urine culture 01/2021 at [facility] revealed E-coli sensitive to multiple antibiotics.      Andria right total knee replacement surgery by Dr. Mcduffie, orthopedist, in [place] on 03/16/2021 with excellent results.  She had left TKR 2 years earlier.  She has a left trigger thumb being addressed by Dr. Mcduffie as well.  She has an appointment scheduled with Dr. Mcduffie next Monday.      Weight is down 6 pounds from reported weight last month with telephone visit.  BMI is still far above goal at 34.8.  We praised her for the weight loss and encouraged more.  BP and HR at goal today in the office.     Patient is due influenza vaccine, which is given today in the office.  She had 2nd dose of Moderna vaccine 04/07/2021.  I recommended COVID booster ASAP, and she is in agreement.     Objective   Vital Signs:   /70   Pulse 60   Temp 97.1 °F (36.2 °C) (Tympanic)   Ht 152.4 cm (60\")   Wt 80.7 kg (178 lb)   BMI 34.76 kg/m²         Physical Exam  Constitutional:       General: She is not in acute distress.     Appearance: She is well-developed.      Comments: Pleasant female, obese.  Accompanied by her , Edgar.    HENT:      Head: Normocephalic and atraumatic.      Nose: Nose normal.      " Mouth/Throat:      Pharynx: No oropharyngeal exudate.   Eyes:      Pupils: Pupils are equal, round, and reactive to light.   Neck:      Thyroid: No thyromegaly.      Vascular: No JVD.   Cardiovascular:      Rate and Rhythm: Normal rate and regular rhythm.      Heart sounds: Normal heart sounds.   Pulmonary:      Effort: Pulmonary effort is normal. No accessory muscle usage or respiratory distress.      Breath sounds: Normal breath sounds. No wheezing or rales.   Abdominal:      General: Bowel sounds are normal. There is no distension.      Palpations: Abdomen is soft.      Tenderness: There is no abdominal tenderness.      Comments: Obese abdomen.    Musculoskeletal:      Cervical back: Neck supple.   Lymphadenopathy:      Cervical: No cervical adenopathy.   Neurological:      Mental Status: She is alert and oriented to person, place, and time.      Cranial Nerves: No cranial nerve deficit.   Psychiatric:         Speech: Speech normal.         Behavior: Behavior normal.         Thought Content: Thought content normal.         Judgment: Judgment normal.            Result Review :     UA    Urinalysis 11/16/21 11/16/21    [id number]   Squamous Epithelial Cells, UA  3-6 (A)   Specific Gravity, UA >=1.030    Ketones, UA Negative    Blood, UA Trace (A)    Leukocytes, UA Negative    Nitrite, UA Negative    RBC, UA  3-5 (A)   WBC, UA  6-12 (A)   Bacteria, UA  Trace (A)   (A) Abnormal value            Data reviewed: Consultant notes Orthopedist notes and Urine culture 01/2021 Marjorie Mcfadden            Future Appointments   Date Time Provider Department Center   12/1/2021  9:30 AM [facility] MAMM 1 [facility]   12/1/2021 10:00 AM [facility] DEXA 1 [facility]   4/18/2022  1:00 PM Christofer Mckeon MD [facility]        Assessment and Plan    Diagnoses and all orders for this visit:    1. Acute cystitis without hematuria (Primary)    2. Urge incontinence of urine    3. Essential hypertension    4. Need for  immunization against influenza    5. Early onset Alzheimer's dementia without behavioral disturbance (HCC)    6. Class 1 obesity due to excess calories with serious comorbidity and body mass index (BMI) of 34.0 to 34.9 in adult    7. Trigger thumb of left hand    8. Impaired fasting glucose    Other orders  -     cefuroxime (CEFTIN) 500 MG tablet; Take 1 tablet by mouth 2 (Two) Times a Day for 5 days.  Dispense: 10 tablet; Refill: 0         I spent 32 minutes caring for Andria on this date of service. This time includes time spent by me in the following activities:preparing for the visit, reviewing tests, obtaining and/or reviewing a separately obtained history, performing a medically appropriate examination and/or evaluation , counseling and educating the patient/family/caregiver, ordering medications, tests, or procedures, documenting information in the medical record and independently interpreting results and communicating that information with the patient/family/caregiver     For the acute UTI, I sent prescription for Ceftin 500 mg to take one b.i.d. x 5 days.  We will review culture results when available.            Hydrate well.  Avoid sugar sweetened beverages.      After informed verbal consent, patient is given high dose influenza vaccine.  She tolerated well.   Recommended COVID booster ASAP.     Keep scheduled appointment with Dr. Mcduffie next week to address left trigger thumb.    Continue current blood pressure medication, which requires multiple meds, but is well controlled.  Continue to pursue aggressive weight loss and sodium restriction.  Increase physical activity and walking for exercise now that her knees are better.  Follow a diabetic style diet due to her IFG.  BMI is still far above goal.    Continue current Alzheimer's medication.  They feel this issue is stable.  She and her  deny mental status changes this past week with her UTI symptoms.       Return in April for routine follow up  with fasting labs one week prior or sooner if needed.     Scribed for Dr. Mckeon by Cesilia Gutierrez [facility].     Follow Up   Return for Next scheduled follow up.  Patient was given instructions and counseling regarding her condition or for health maintenance advice. Please see specific information pulled into the AVS if appropriate.

## 2021-11-17 LAB — BACTERIA SPEC AEROBE CULT: ABNORMAL

## 2021-11-29 RX ORDER — MONTELUKAST SODIUM 10 MG/1
10 TABLET ORAL NIGHTLY
Qty: 30 TABLET | Refills: 5 | Status: SHIPPED | OUTPATIENT
Start: 2021-11-29 | End: 2022-06-07

## 2021-12-10 PROBLEM — M85.832 OSTEOPENIA OF LEFT FOREARM: Chronic | Status: ACTIVE | Noted: 2021-12-10

## 2022-01-27 DIAGNOSIS — I10 ESSENTIAL HYPERTENSION: Primary | ICD-10-CM

## 2022-01-27 RX ORDER — CARVEDILOL 6.25 MG/1
6.25 TABLET ORAL 2 TIMES DAILY WITH MEALS
Qty: 60 TABLET | Refills: 11 | Status: SHIPPED | OUTPATIENT
Start: 2022-01-27 | End: 2022-02-28 | Stop reason: SDUPTHER

## 2022-02-07 RX ORDER — LISINOPRIL AND HYDROCHLOROTHIAZIDE 20; 12.5 MG/1; MG/1
1 TABLET ORAL EVERY MORNING
Qty: 90 TABLET | Refills: 3 | Status: SHIPPED | OUTPATIENT
Start: 2022-02-07 | End: 2022-04-19 | Stop reason: SDUPTHER

## 2022-02-15 RX ORDER — VENLAFAXINE HYDROCHLORIDE 150 MG/1
150 CAPSULE, EXTENDED RELEASE ORAL DAILY
Qty: 90 CAPSULE | Refills: 3 | Status: SHIPPED | OUTPATIENT
Start: 2022-02-15 | End: 2023-03-21

## 2022-02-28 DIAGNOSIS — I10 ESSENTIAL HYPERTENSION: ICD-10-CM

## 2022-02-28 RX ORDER — CARVEDILOL 6.25 MG/1
6.25 TABLET ORAL 2 TIMES DAILY WITH MEALS
Qty: 60 TABLET | Refills: 11 | Status: SHIPPED | OUTPATIENT
Start: 2022-02-28 | End: 2023-03-03

## 2022-04-11 ENCOUNTER — OFFICE VISIT (OUTPATIENT)
Dept: FAMILY MEDICINE CLINIC | Facility: CLINIC | Age: 67
End: 2022-04-11

## 2022-04-11 VITALS
HEIGHT: 60 IN | BODY MASS INDEX: 35.73 KG/M2 | TEMPERATURE: 97.9 F | DIASTOLIC BLOOD PRESSURE: 78 MMHG | WEIGHT: 182 LBS | HEART RATE: 60 BPM | SYSTOLIC BLOOD PRESSURE: 110 MMHG

## 2022-04-11 DIAGNOSIS — S69.91XA FINGER INJURY, RIGHT, INITIAL ENCOUNTER: ICD-10-CM

## 2022-04-11 DIAGNOSIS — L57.0 AK (ACTINIC KERATOSIS): ICD-10-CM

## 2022-04-11 DIAGNOSIS — Z00.00 MEDICARE ANNUAL WELLNESS VISIT, SUBSEQUENT: Primary | ICD-10-CM

## 2022-04-11 DIAGNOSIS — E66.01 CLASS 2 SEVERE OBESITY DUE TO EXCESS CALORIES WITH SERIOUS COMORBIDITY AND BODY MASS INDEX (BMI) OF 35.0 TO 35.9 IN ADULT: Chronic | ICD-10-CM

## 2022-04-11 DIAGNOSIS — I10 ESSENTIAL HYPERTENSION: Chronic | ICD-10-CM

## 2022-04-11 DIAGNOSIS — R29.6 FALL IN ELDERLY PATIENT: ICD-10-CM

## 2022-04-11 PROCEDURE — 1159F MED LIST DOCD IN RCRD: CPT | Performed by: INTERNAL MEDICINE

## 2022-04-11 PROCEDURE — 90732 PPSV23 VACC 2 YRS+ SUBQ/IM: CPT | Performed by: INTERNAL MEDICINE

## 2022-04-11 PROCEDURE — 99213 OFFICE O/P EST LOW 20 MIN: CPT | Performed by: INTERNAL MEDICINE

## 2022-04-11 PROCEDURE — G0439 PPPS, SUBSEQ VISIT: HCPCS | Performed by: INTERNAL MEDICINE

## 2022-04-11 PROCEDURE — G0009 ADMIN PNEUMOCOCCAL VACCINE: HCPCS | Performed by: INTERNAL MEDICINE

## 2022-04-11 PROCEDURE — 17004 DESTROY PREMAL LESIONS 15/>: CPT | Performed by: INTERNAL MEDICINE

## 2022-04-11 NOTE — PATIENT INSTRUCTIONS
Medicare Wellness  Personal Prevention Plan of Service     Date of Office Visit:    Encounter Provider:  Christofer Mckeon MD  Place of Service:  [facility]  Patient Name: Andria Goldstein  :  1955    As part of the Medicare Wellness portion of your visit today, we are providing you with this personalized preventive plan of services (PPPS). This plan is based upon recommendations of the [place] Preventive Services Task Force (USPSTF) and the Advisory Committee on Immunization Practices (ACIP).    This lists the preventive care services that should be considered, and provides dates of when you are due. Items listed as completed are up-to-date and do not require any further intervention.    Health Maintenance   Topic Date Due    ZOSTER VACCINE (1 of 2) Never done    Pneumococcal Vaccine 65+ (2 - PPSV23 or PCV20) 2020    COVID-19 Vaccine (3 - Booster) 2021    ANNUAL WELLNESS VISIT  2022    INFLUENZA VACCINE  2022    LIPID PANEL  10/11/2022    MAMMOGRAM  2023    DXA SCAN  2023    COLORECTAL CANCER SCREENING  2026    TDAP/TD VACCINES (2 - Tdap) 2026    HEPATITIS C SCREENING  Completed    PAP SMEAR  Discontinued       No orders of the defined types were placed in this encounter.      No follow-ups on file.              Exercising to Lose Weight  Exercise is structured, repetitive physical activity to improve fitness and health. Getting regular exercise is important for everyone. It is especially important if you are overweight. Being overweight increases your risk of heart disease, stroke, diabetes, high blood pressure, and several types of cancer. Reducing your calorie intake and exercising can help you lose weight.  Exercise is usually categorized as moderate or vigorous intensity. To lose weight, most people need to do a certain amount of moderate-intensity or vigorous-intensity exercise each  week.  Moderate-intensity exercise    Moderate-intensity exercise is any activity that gets you moving enough to burn at least three times more energy (calories) than if you were sitting.  Examples of moderate exercise include:  Walking a mile in 15 minutes.  Doing light yard work.  Biking at an easy pace.  Most people should get at least 150 minutes (2 hours and 30 minutes) a week of moderate-intensity exercise to maintain their body weight.  Vigorous-intensity exercise  Vigorous-intensity exercise is any activity that gets you moving enough to burn at least six times more calories than if you were sitting. When you exercise at this intensity, you should be working hard enough that you are not able to carry on a conversation.  Examples of vigorous exercise include:  Running.  Playing a team sport, such as football, basketball, and soccer.  Jumping rope.  Most people should get at least 75 minutes (1 hour and 15 minutes) a week of vigorous-intensity exercise to maintain their body weight.  How can exercise affect me?  When you exercise enough to burn more calories than you eat, you lose weight. Exercise also reduces body fat and builds muscle. The more muscle you have, the more calories you burn. Exercise also:  Improves mood.  Reduces stress and tension.  Improves your overall fitness, flexibility, and endurance.  Increases bone strength.  The amount of exercise you need to lose weight depends on:  Your age.  The type of exercise.  Any health conditions you have.  Your overall physical ability.  Talk to your health care provider about how much exercise you need and what types of activities are safe for you.  What actions can I take to lose weight?  Nutrition    Make changes to your diet as told by your health care provider or diet and nutrition specialist (dietitian). This may include:  Eating fewer calories.  Eating more protein.  Eating less unhealthy fats.  Eating a diet that includes fresh fruits and  vegetables, whole grains, low-fat dairy products, and lean protein.  Avoiding foods with added fat, salt, and sugar.  Drink plenty of water while you exercise to prevent dehydration or heat stroke.    Activity  Choose an activity that you enjoy and set realistic goals. Your health care provider can help you make an exercise plan that works for you.  Exercise at a moderate or vigorous intensity most days of the week.  The intensity of exercise may vary from person to person. You can tell how intense a workout is for you by paying attention to your breathing and heartbeat. Most people will notice their breathing and heartbeat get faster with more intense exercise.  Do resistance training twice each week, such as:  Push-ups.  Sit-ups.  Lifting weights.  Using resistance bands.  Getting short amounts of exercise can be just as helpful as long structured periods of exercise. If you have trouble finding time to exercise, try to include exercise in your daily routine.  Get up, stretch, and walk around every 30 minutes throughout the day.  Go for a walk during your lunch break.  Park your car farther away from your destination.  If you take public transportation, get off one stop early and walk the rest of the way.  Make phone calls while standing up and walking around.  Take the stairs instead of elevators or escalators.  Wear comfortable clothes and shoes with good support.  Do not exercise so much that you hurt yourself, feel dizzy, or get very short of breath.  Where to find more information  [place] Department of Health and Human Services: www.hhs.gov  Centers for Disease Control and Prevention (CDC): www.cdc.gov  Contact a health care provider:  Before starting a new exercise program.  If you have questions or concerns about your weight.  If you have a medical problem that keeps you from exercising.  Get help right away if you have any of the following while exercising:  Injury.  Dizziness.  Difficulty breathing or  shortness of breath that does not go away when you stop exercising.  Chest pain.  Rapid heartbeat.  Summary  Being overweight increases your risk of heart disease, stroke, diabetes, high blood pressure, and several types of cancer.  Losing weight happens when you burn more calories than you eat.  Reducing the amount of calories you eat in addition to getting regular moderate or vigorous exercise each week helps you lose weight.  This information is not intended to replace advice given to you by your health care provider. Make sure you discuss any questions you have with your health care provider.  Document Revised: 04/15/2021 Document Reviewed: 04/15/2021  [employer] Patient Education © 2021 [employer]      Calorie Counting for Weight Loss  Calories are units of energy. Your body needs a certain number of calories from food to keep going throughout the day. When you eat or drink more calories than your body needs, your body stores the extra calories mostly as fat. When you eat or drink fewer calories than your body needs, your body burns fat to get the energy it needs.  Calorie counting means keeping track of how many calories you eat and drink each day. Calorie counting can be helpful if you need to lose weight. If you eat fewer calories than your body needs, you should lose weight. Ask your health care provider what a healthy weight is for you.  For calorie counting to work, you will need to eat the right number of calories each day to lose a healthy amount of weight per week. A dietitian can help you figure out how many calories you need in a day and will suggest ways to reach your calorie goal.  A healthy amount of weight to lose each week is usually 1-2 lb (0.5-0.9 kg). This usually means that your daily calorie intake should be reduced by 500-750 calories.  Eating 1,200-1,500 calories a day can help most women lose weight.  Eating 1,500-1,800 calories a day can help most men lose weight.  What do I need to  know about calorie counting?  Work with your health care provider or dietitian to determine how many calories you should get each day. To meet your daily calorie goal, you will need to:  Find out how many calories are in each food that you would like to eat. Try to do this before you eat.  Decide how much of the food you plan to eat.  Keep a food log. Do this by writing down what you ate and how many calories it had.  To successfully lose weight, it is important to balance calorie counting with a healthy lifestyle that includes regular activity.  Where do I find calorie information?    The number of calories in a food can be found on a Nutrition Facts label. If a food does not have a Nutrition Facts label, try to look up the calories online or ask your dietitian for help.  Remember that calories are listed per serving. If you choose to have more than one serving of a food, you will have to multiply the calories per serving by the number of servings you plan to eat. For example, the label on a package of bread might say that a serving size is 1 slice and that there are 90 calories in a serving. If you eat 1 slice, you will have eaten 90 calories. If you eat 2 slices, you will have eaten 180 calories.  How do I keep a food log?  After each time that you eat, record the following in your food log as soon as possible:  What you ate. Be sure to include toppings, sauces, and other extras on the food.  How much you ate. This can be measured in cups, ounces, or number of items.  How many calories were in each food and drink.  The total number of calories in the food you ate.  Keep your food log near you, such as in a pocket-sized notebook or on an allegra or website on your mobile phone. Some programs will calculate calories for you and show you how many calories you have left to meet your daily goal.  What are some portion-control tips?  Know how many calories are in a serving. This will help you know how many servings you can  have of a certain food.  Use a measuring cup to measure serving sizes. You could also try weighing out portions on a kitchen scale. With time, you will be able to estimate serving sizes for some foods.  Take time to put servings of different foods on your favorite plates or in your favorite bowls and cups so you know what a serving looks like.  Try not to eat straight from a food's packaging, such as from a bag or box. Eating straight from the package makes it hard to see how much you are eating and can lead to overeating. Put the amount you would like to eat in a cup or on a plate to make sure you are eating the right portion.  Use smaller plates, glasses, and bowls for smaller portions and to prevent overeating.  Try not to multitask. For example, avoid watching TV or using your computer while eating. If it is time to eat, sit down at a table and enjoy your food. This will help you recognize when you are full. It will also help you be more mindful of what and how much you are eating.  What are tips for following this plan?  Reading food labels  Check the calorie count compared with the serving size. The serving size may be smaller than what you are used to eating.  Check the source of the calories. Try to choose foods that are high in protein, fiber, and vitamins, and low in saturated fat, trans fat, and sodium.  Shopping  Read nutrition labels while you shop. This will help you make healthy decisions about which foods to buy.  Pay attention to nutrition labels for low-fat or fat-free foods. These foods sometimes have the same number of calories or more calories than the full-fat versions. They also often have added sugar, starch, or salt to make up for flavor that was removed with the fat.  Make a grocery list of lower-calorie foods and stick to it.  Cooking  Try to cook your favorite foods in a healthier way. For example, try baking instead of frying.  Use low-fat dairy products.  Meal planning  Use more fruits  and vegetables. One-half of your plate should be fruits and vegetables.  Include lean proteins, such as chicken, turkey, and fish.  Lifestyle  Each week, aim to do one of the followin minutes of moderate exercise, such as walking.  75 minutes of vigorous exercise, such as running.  General information  Know how many calories are in the foods you eat most often. This will help you calculate calorie counts faster.  Find a way of tracking calories that works for you. Get creative. Try different apps or programs if writing down calories does not work for you.  What foods should I eat?    Eat nutritious foods. It is better to have a nutritious, high-calorie food, such as an avocado, than a food with few nutrients, such as a bag of potato chips.  Use your calories on foods and drinks that will fill you up and will not leave you hungry soon after eating.  Examples of foods that fill you up are nuts and nut butters, vegetables, lean proteins, and high-fiber foods such as whole grains. High-fiber foods are foods with more than 5 g of fiber per serving.  Pay attention to calories in drinks. Low-calorie drinks include water and unsweetened drinks.  The items listed above may not be a complete list of foods and beverages you can eat. Contact a dietitian for more information.  What foods should I limit?  Limit foods or drinks that are not good sources of vitamins, minerals, or protein or that are high in unhealthy fats. These include:  Candy.  Other sweets.  Sodas, specialty coffee drinks, alcohol, and juice.  The items listed above may not be a complete list of foods and beverages you should avoid. Contact a dietitian for more information.  How do I count calories when eating out?  Pay attention to portions. Often, portions are much larger when eating out. Try these tips to keep portions smaller:  Consider sharing a meal instead of getting your own.  If you get your own meal, eat only half of it. Before you start eating,  ask for a container and put half of your meal into it.  When available, consider ordering smaller portions from the menu instead of full portions.  Pay attention to your food and drink choices. Knowing the way food is cooked and what is included with the meal can help you eat fewer calories.  If calories are listed on the menu, choose the lower-calorie options.  Choose dishes that include vegetables, fruits, whole grains, low-fat dairy products, and lean proteins.  Choose items that are boiled, broiled, grilled, or steamed. Avoid items that are buttered, battered, fried, or served with cream sauce. Items labeled as crispy are usually fried, unless stated otherwise.  Choose water, low-fat milk, unsweetened iced tea, or other drinks without added sugar. If you want an alcoholic beverage, choose a lower-calorie option, such as a glass of wine or light beer.  Ask for dressings, sauces, and syrups on the side. These are usually high in calories, so you should limit the amount you eat.  If you want a salad, choose a garden salad and ask for grilled meats. Avoid extra toppings such as saleem, cheese, or fried items. Ask for the dressing on the side, or ask for olive oil and vinegar or lemon to use as dressing.  Estimate how many servings of a food you are given. Knowing serving sizes will help you be aware of how much food you are eating at restaurants.  Where to find more information  Centers for Disease Control and Prevention: www.cdc.gov  [place]. Department of Agriculture: myplate.gov  Summary  Calorie counting means keeping track of how many calories you eat and drink each day. If you eat fewer calories than your body needs, you should lose weight.  A healthy amount of weight to lose per week is usually 1-2 lb (0.5-0.9 kg). This usually means reducing your daily calorie intake by 500-750 calories.  The number of calories in a food can be found on a Nutrition Facts label. If a food does not have a Nutrition Facts label, try  to look up the calories online or ask your dietitian for help.  Use smaller plates, glasses, and bowls for smaller portions and to prevent overeating.  Use your calories on foods and drinks that will fill you up and not leave you hungry shortly after a meal.  This information is not intended to replace advice given to you by your health care provider. Make sure you discuss any questions you have with your health care provider.  Document Revised: 01/28/2021 Document Reviewed: 01/28/2021  Elsevier Patient Education © 2021 [employer]

## 2022-04-11 NOTE — PROGRESS NOTES
"Chief Complaint  Medicare Wellness-subsequent, lesion on arms  (She is concerned about ), and Immunizations (Pneu 23 given IM in left deltoid and tolerated procedure well without adverse reactions)    Subjective          History of Present Illness     Andria Goldstein had a recent elderly fall two weeks ago injuring right fifth finger with swelling and bruising.  She did not seek medical attention at the time.  We will get x-rays of the finger today to rule out fracture.  Current range of motion is quite limited.  She is keeping it in a malleable splint.  We discussed how to gradually improve range of motion, but only to be done if there is no fracture.       Patient also has fair skin complexion with sun damaged skin and multiple AKs on face and bilateral forearms addressed with cryotherapy in the office today.  See procedure note below.      She also completes subsequent Medicare wellness exam while in the office today.   She is given Pneumovax in the office today.  Recommended COVID booster this week.  Patient has had two doses of COVID vaccine, but no booster yet.     BP at goal in the office today.  Weight is up 4 pounds in the past 5 months with BMI now 35.5    After informed verbal consent, cryotherapy is used to destroy a total of 20 actinic keratoses including 3 AKs left forearm, 10 AKs right forearm, 2 AKs outer helix of left ear, 1 AK outer helix of right ear, 1 AK left temple and 3 AKs on right temple.  She tolerated procedure well.        Objective   Vital Signs:   /78   Pulse 60   Temp 97.9 °F (36.6 °C) (Tympanic)   Ht 152.4 cm (60\")   Wt 82.6 kg (182 lb)   BMI 35.54 kg/m²              Physical Exam  Vitals reviewed.   Constitutional:       General: She is not in acute distress.     Appearance: She is well-developed.      Comments: Pleasant female, obese.  Accompanied by her .    HENT:      Head: Normocephalic and atraumatic.      Nose:      Right Sinus: No maxillary sinus " tenderness or frontal sinus tenderness.      Left Sinus: No maxillary sinus tenderness or frontal sinus tenderness.      Mouth/Throat:      Mouth: No oral lesions.      Pharynx: Uvula midline.      Tonsils: No tonsillar exudate.   Eyes:      Conjunctiva/sclera: Conjunctivae normal.      Pupils: Pupils are equal, round, and reactive to light.   Neck:      Thyroid: No thyroid mass or thyromegaly.      Vascular: No carotid bruit or JVD.      Trachea: Trachea normal. No tracheal deviation.   Cardiovascular:      Rate and Rhythm:  No extrasystoles are present.     Chest Wall: PMI is not displaced.      Heart sounds: Normal heart sounds. No murmur heard.  Pulmonary:      Effort: Pulmonary effort is normal. No accessory muscle usage or respiratory distress.      Breath sounds: Normal breath sounds. No decreased breath sounds or wheezing.   Abdominal:      General: There is no distension.      Palpations: Abdomen is soft.      Tenderness: There is no abdominal tenderness.      Comments: Obese abdomen   Musculoskeletal:      Cervical back: Neck supple.   Lymphadenopathy:      Cervical: No cervical adenopathy.   Skin:     General: Skin is warm and dry.      Findings: No rash.      Nails: There is no clubbing.      Comments: Fair complected, sun damaged skin.  A total of 20 actinic keratoses including 3 AKs left forearm, 10 AKs right forearm, 2 AKs outer helix of left ear, 1 AK outer helix of right ear, 1 AK left temple and 3 AKs on right temple.  She tolerated procedure well.       Neurological:      Mental Status: She is alert and oriented to person, place, and time. Mental status is at baseline.      Cranial Nerves: No cranial nerve deficit.      Coordination: Coordination normal.      Comments: Baseline dementia without agitation   Psychiatric:         Speech: Speech normal.         Behavior: Behavior normal.         Thought Content: Thought content normal.         Judgment: Judgment normal.        Result Review :        Data reviewed: Radiologic studies X-rays of finger         Future Appointments   Date Time Provider Department Center   4/18/2022  1:00 PM Christofer Mckeon MD [facility]        Assessment and Plan    Diagnoses and all orders for this visit:    1. Medicare annual wellness visit, subsequent (Primary)    2. Fall in elderly patient    3. Finger injury, right, initial encounter  -     XR Finger 2+ View Right    4. Essential hypertension    5. Class 2 severe obesity due to excess calories with serious comorbidity and body mass index (BMI) of 35.0 to 35.9 in adult (HCC)    6. AK (actinic keratosis)  -     Cryotherapy, Skin Lesion    Other orders  -     Pneumococcal Polysaccharide Vaccine 23-Valent Greater Than or Equal To 3yo Subcutaneous / IM         I spent 21 minutes caring for Andria on this date of service. This time includes time spent by me in the following activities:preparing for the visit, reviewing tests, obtaining and/or reviewing a separately obtained history, performing a medically appropriate examination and/or evaluation , counseling and educating the patient/family/caregiver, ordering medications, tests, or procedures, documenting information in the medical record and care coordination with her /primary caregiver.  This does not include time spent on her Medicare AWV today.  This does not include time spent on cryotherapy procedure today.    Patient completes subsequent Medicare wellness exam today in the office.  Recommended COVID booster ASAP.  After informed verbal consent, patient is given Pneumovax today in the office.  She tolerated well.       Patient is going to stop by for x-ray of right fifth finger.   We will notify with results.  Continue wearing the splint.  I see no evidence of fracture on the x-rays, but await radiology confirmation.  We outlined HEP for a gradual increase in range of motion as tolerated    Cryotherapy, Skin Lesion    Date/Time: 4/11/2022 12:10 PM  Performed  by: Christofer Mckeon MD  Authorized by: Christofer Mckeon MD   Local anesthesia used: no    Anesthesia:  Local anesthesia used: no    Sedation:  Patient sedated: no    Patient tolerance: patient tolerated the procedure well with no immediate complications  Comments: After informed verbal consent, cryotherapy is used to destroy a total of 20 actinic keratoses including 3 AKs left forearm, 10 AKs right forearm, 2 AKs outer helix of left ear, 1 AK outer helix of right ear, 1 AK left temple and 3 AKs on right temple.  She tolerated procedure well.  Total procedure time: 11 minutes      Recommended safe sun exposure measures including apply sunscreen prior to sun exposure and wearing protective clothing with sun exposure.      Return in one week for routine follow up with fasting labs prior.    Scribed for Dr. Mckeon by Cesilia Gutierrez [facility].         Follow Up   Return for Next scheduled follow up.  Patient was given instructions and counseling regarding her condition or for health maintenance advice. Please see specific information pulled into the AVS if appropriate.

## 2022-04-13 ENCOUNTER — LAB (OUTPATIENT)
Dept: LAB | Facility: OTHER | Age: 67
End: 2022-04-13

## 2022-04-13 DIAGNOSIS — D53.1 MEGALOBLASTIC ANEMIA DUE TO VITAMIN B12 DEFICIENCY: Chronic | ICD-10-CM

## 2022-04-13 DIAGNOSIS — R73.01 IMPAIRED FASTING GLUCOSE: Chronic | ICD-10-CM

## 2022-04-13 DIAGNOSIS — E78.2 MIXED HYPERLIPIDEMIA: Chronic | ICD-10-CM

## 2022-04-13 DIAGNOSIS — I10 ESSENTIAL HYPERTENSION: Chronic | ICD-10-CM

## 2022-04-13 LAB
ALBUMIN SERPL-MCNC: 4.1 G/DL (ref 3.5–5)
ALBUMIN/GLOB SERPL: 1.6 G/DL (ref 1.1–1.8)
ALP SERPL-CCNC: 102 U/L (ref 38–126)
ALT SERPL W P-5'-P-CCNC: 13 U/L
ANION GAP SERPL CALCULATED.3IONS-SCNC: 5 MMOL/L (ref 5–15)
AST SERPL-CCNC: 22 U/L (ref 14–36)
BASOPHILS # BLD AUTO: 0.02 10*3/MM3 (ref 0–0.2)
BASOPHILS NFR BLD AUTO: 0.3 % (ref 0–1.5)
BILIRUB SERPL-MCNC: 0.5 MG/DL (ref 0.2–1.3)
BUN SERPL-MCNC: 16 MG/DL (ref 7–23)
BUN/CREAT SERPL: 17 (ref 7–25)
CALCIUM SPEC-SCNC: 9.6 MG/DL (ref 8.4–10.2)
CHLORIDE SERPL-SCNC: 104 MMOL/L (ref 101–112)
CHOLEST SERPL-MCNC: 163 MG/DL (ref 150–200)
CO2 SERPL-SCNC: 32 MMOL/L (ref 22–30)
CREAT SERPL-MCNC: 0.94 MG/DL (ref 0.52–1.04)
DEPRECATED RDW RBC AUTO: 44.2 FL (ref 37–54)
EGFRCR SERPLBLD CKD-EPI 2021: 66.2 ML/MIN/1.73
EOSINOPHIL # BLD AUTO: 0.17 10*3/MM3 (ref 0–0.4)
EOSINOPHIL NFR BLD AUTO: 3 % (ref 0.3–6.2)
ERYTHROCYTE [DISTWIDTH] IN BLOOD BY AUTOMATED COUNT: 14 % (ref 12.3–15.4)
GLOBULIN UR ELPH-MCNC: 2.6 GM/DL (ref 2.3–3.5)
GLUCOSE SERPL-MCNC: 111 MG/DL (ref 70–99)
HCT VFR BLD AUTO: 42.4 % (ref 34–46.6)
HDLC SERPL-MCNC: 75 MG/DL (ref 40–59)
HGB BLD-MCNC: 13.4 G/DL (ref 12–15.9)
LDLC SERPL CALC-MCNC: 66 MG/DL
LDLC/HDLC SERPL: 0.83 {RATIO} (ref 0–3.22)
LYMPHOCYTES # BLD AUTO: 1.11 10*3/MM3 (ref 0.7–3.1)
LYMPHOCYTES NFR BLD AUTO: 19.4 % (ref 19.6–45.3)
MCH RBC QN AUTO: 28 PG (ref 26.6–33)
MCHC RBC AUTO-ENTMCNC: 31.6 G/DL (ref 31.5–35.7)
MCV RBC AUTO: 88.7 FL (ref 79–97)
MONOCYTES # BLD AUTO: 0.35 10*3/MM3 (ref 0.1–0.9)
MONOCYTES NFR BLD AUTO: 6.1 % (ref 5–12)
NEUTROPHILS NFR BLD AUTO: 4.07 10*3/MM3 (ref 1.7–7)
NEUTROPHILS NFR BLD AUTO: 71.2 % (ref 42.7–76)
PLATELET # BLD AUTO: 190 10*3/MM3 (ref 140–450)
PMV BLD AUTO: 12.7 FL (ref 6–12)
POTASSIUM SERPL-SCNC: 3.9 MMOL/L (ref 3.4–5)
PROT SERPL-MCNC: 6.7 G/DL (ref 6.3–8.6)
RBC # BLD AUTO: 4.78 10*6/MM3 (ref 3.77–5.28)
SODIUM SERPL-SCNC: 141 MMOL/L (ref 137–145)
TRIGL SERPL-MCNC: 127 MG/DL
VLDLC SERPL-MCNC: 22 MG/DL (ref 5–40)
WBC NRBC COR # BLD: 5.72 10*3/MM3 (ref 3.4–10.8)

## 2022-04-13 PROCEDURE — 82607 VITAMIN B-12: CPT | Performed by: INTERNAL MEDICINE

## 2022-04-13 PROCEDURE — 80053 COMPREHEN METABOLIC PANEL: CPT | Performed by: INTERNAL MEDICINE

## 2022-04-13 PROCEDURE — 83036 HEMOGLOBIN GLYCOSYLATED A1C: CPT | Performed by: INTERNAL MEDICINE

## 2022-04-13 PROCEDURE — 36415 COLL VENOUS BLD VENIPUNCTURE: CPT | Performed by: INTERNAL MEDICINE

## 2022-04-13 PROCEDURE — 80061 LIPID PANEL: CPT | Performed by: INTERNAL MEDICINE

## 2022-04-13 PROCEDURE — 85025 COMPLETE CBC W/AUTO DIFF WBC: CPT | Performed by: INTERNAL MEDICINE

## 2022-04-14 LAB
HBA1C MFR BLD: 5.5 % (ref 4.8–5.6)
VIT B12 BLD-MCNC: 1091 PG/ML (ref 211–946)

## 2022-04-18 ENCOUNTER — OFFICE VISIT (OUTPATIENT)
Dept: FAMILY MEDICINE CLINIC | Facility: CLINIC | Age: 67
End: 2022-04-18

## 2022-04-18 VITALS
OXYGEN SATURATION: 99 % | SYSTOLIC BLOOD PRESSURE: 138 MMHG | HEIGHT: 60 IN | HEART RATE: 54 BPM | DIASTOLIC BLOOD PRESSURE: 84 MMHG | BODY MASS INDEX: 36.12 KG/M2 | WEIGHT: 184 LBS | TEMPERATURE: 97.5 F

## 2022-04-18 DIAGNOSIS — E66.01 CLASS 2 SEVERE OBESITY DUE TO EXCESS CALORIES WITH SERIOUS COMORBIDITY AND BODY MASS INDEX (BMI) OF 35.0 TO 35.9 IN ADULT: Chronic | ICD-10-CM

## 2022-04-18 DIAGNOSIS — I10 ESSENTIAL HYPERTENSION: Chronic | ICD-10-CM

## 2022-04-18 DIAGNOSIS — D53.1 MEGALOBLASTIC ANEMIA DUE TO VITAMIN B12 DEFICIENCY: Chronic | ICD-10-CM

## 2022-04-18 DIAGNOSIS — K21.9 GASTROESOPHAGEAL REFLUX DISEASE WITHOUT ESOPHAGITIS: Chronic | ICD-10-CM

## 2022-04-18 DIAGNOSIS — M50.30 DEGENERATION OF CERVICAL INTERVERTEBRAL DISC: ICD-10-CM

## 2022-04-18 DIAGNOSIS — R42 POSITIONAL LIGHTHEADEDNESS: ICD-10-CM

## 2022-04-18 DIAGNOSIS — G30.0 EARLY ONSET ALZHEIMER'S DEMENTIA WITHOUT BEHAVIORAL DISTURBANCE: Chronic | ICD-10-CM

## 2022-04-18 DIAGNOSIS — M85.832 OSTEOPENIA OF LEFT FOREARM: Chronic | ICD-10-CM

## 2022-04-18 DIAGNOSIS — E78.2 MIXED HYPERLIPIDEMIA: Primary | Chronic | ICD-10-CM

## 2022-04-18 DIAGNOSIS — F02.80 EARLY ONSET ALZHEIMER'S DEMENTIA WITHOUT BEHAVIORAL DISTURBANCE: Chronic | ICD-10-CM

## 2022-04-18 DIAGNOSIS — R73.01 IMPAIRED FASTING GLUCOSE: Chronic | ICD-10-CM

## 2022-04-18 PROCEDURE — 99214 OFFICE O/P EST MOD 30 MIN: CPT | Performed by: INTERNAL MEDICINE

## 2022-04-18 RX ORDER — LORATADINE 10 MG/1
10 TABLET ORAL DAILY PRN
COMMUNITY

## 2022-04-18 RX ORDER — GABAPENTIN 800 MG/1
TABLET ORAL
Qty: 60 TABLET | Refills: 5 | Status: SHIPPED | OUTPATIENT
Start: 2022-04-18 | End: 2022-10-26 | Stop reason: SDUPTHER

## 2022-04-18 RX ORDER — PHENYLEPHRINE HCL 10 MG/1
TABLET, FILM COATED ORAL
Qty: 36 TABLET | Refills: 0 | Status: SHIPPED | OUTPATIENT
Start: 2022-04-18

## 2022-04-18 NOTE — PROGRESS NOTES
Chief Complaint  Follow-up (6 month), Headache, Dizziness, and Med Refill (Gabapentin)    Subjective          History of Present Illness     Andria Goldstein presents to the office for 6-month follow up on hyperlipidemia, hypertension, cerebrovascular disease, early onset Alzheimer's dementia, impaired fasting glucose and vitamin B-12 deficiency among other issues.  She continues on Aricept and Namenda for Alzheimer's dementia and continues on  Effexor XR for anxiety and depression.      Patient reports chronic frequent sinus headaches and congestion, worse this spring and worse as the day progresses.  She has also noticed increased sneezing and rhinorrhea indicating a seasonal allergy component.  She has taken Excedrin at times, but is not currently taking a daily antihistamine.        Patient injured her right fifth finger in a fall three weeks ago.  X-rays revealed a minimal crack at the base of distal phalanx.  She continues to wear a malleable finger splint.  DEXA 12/2021 revealed left forearm density met criteria for osteopenia, although, hip density remained normal density. She continues calcium and vitamin D    Patient continues on Neurontin for chronic low back pain with bilateral sciatica.  She is tolerating the Namenda well with the exception of some dizziness/feeling lightheaded with quick position changes.  Otherwise, no significant side effects with the Neurontin.  We discussed benefits of slow position changes.      Weight is up 4 pounds from in person office visit 2 years ago prior to COVID pandemic.  Impaired fasting glucose is progressing, although, not to level of diabetes.  She admits she makes a weekly peach cobbler.   We reviewed diet principles to help delay further progression including reducing carbohydrates and concentrated sweets.      HR and BP acceptable in the office today.     The patient's relevant past medical, surgical, and social history was reviewed in Epic.   Lab results are  "reviewed with the patient today.  CBC unremarkable with hemoglobin improved and normal at 13.4.  Fasting glucose 111.  A1c 5.5.  Total cholesterol 163 with Lipitor.  HDL excellent at 75 giving her a favorable cholesterol ratio.  Vitamin B-12 at goal with oral B-12 supplement.   Normal renal and liver function.       Objective   Vital Signs:   /84   Pulse 54   Temp 97.5 °F (36.4 °C) (Tympanic)   Ht 152.4 cm (60\")   Wt 83.5 kg (184 lb)   SpO2 99%   BMI 35.94 kg/m²     BMI is above normal parameters. Recommendations: educational material discussed/shared in after visit summary       Physical Exam  Vitals reviewed.   Constitutional:       General: She is not in acute distress.     Appearance: She is well-developed.      Comments: Pleasant female, obese.  Accompanied by .    HENT:      Head: Normocephalic and atraumatic.      Nose:      Right Sinus: No maxillary sinus tenderness or frontal sinus tenderness.      Left Sinus: No maxillary sinus tenderness or frontal sinus tenderness.      Mouth/Throat:      Mouth: No oral lesions.      Pharynx: Uvula midline.      Tonsils: No tonsillar exudate.   Eyes:      Conjunctiva/sclera: Conjunctivae normal.      Pupils: Pupils are equal, round, and reactive to light.   Neck:      Thyroid: No thyroid mass or thyromegaly.      Vascular: No carotid bruit or JVD.      Trachea: Trachea normal. No tracheal deviation.   Cardiovascular:      Rate and Rhythm: Normal rate and regular rhythm.  No extrasystoles are present.     Chest Wall: PMI is not displaced.      Heart sounds: Normal heart sounds. No murmur heard.  Pulmonary:      Effort: Pulmonary effort is normal. No accessory muscle usage or respiratory distress.      Breath sounds: Normal breath sounds. No decreased breath sounds, wheezing, rhonchi or rales.   Abdominal:      General: Bowel sounds are normal. There is no distension.      Palpations: Abdomen is soft.      Tenderness: There is no abdominal tenderness.    "   Comments: Overweight abdomen.    Musculoskeletal:      Cervical back: Neck supple.   Lymphadenopathy:      Cervical: No cervical adenopathy.   Skin:     General: Skin is warm and dry.      Findings: No rash.      Nails: There is no clubbing.   Neurological:      Mental Status: She is alert and oriented to person, place, and time.      Cranial Nerves: No cranial nerve deficit.      Coordination: Coordination normal.   Psychiatric:         Speech: Speech normal.         Behavior: Behavior normal.         Thought Content: Thought content normal.         Judgment: Judgment normal.            Result Review :     CMP    CMP 10/11/21 4/13/22   Glucose 102 (A) 111 (A)   BUN 16 16   Creatinine 0.79 0.94   eGFR Non African Am 73    Sodium 140 141   Potassium 4.0 3.9   Chloride 105 104   Calcium 9.5 9.6   Albumin 4.10 4.10   Total Bilirubin 0.5 0.5   Alkaline Phosphatase 109 102   AST (SGOT) 22 22   ALT (SGPT) 15 13   (A) Abnormal value            CBC w/diff    CBC w/Diff 10/11/21 4/13/22   WBC 5.63 5.72   RBC 4.61 4.78   Hemoglobin 13.0 13.4   Hematocrit 40.7 42.4   MCV 88.3 88.7   MCH 28.2 28.0   MCHC 31.9 31.6   RDW 14.2 14.0   Platelets 211 190   Neutrophil Rel % 69.2 71.2   Lymphocyte Rel % 20.6 19.4 (A)   Monocyte Rel % 6.4 6.1   Eosinophil Rel % 3.4 3.0   Basophil Rel % 0.4 0.3   (A) Abnormal value            Lipid Panel    Lipid Panel 10/11/21 4/13/22   Total Cholesterol  163   Triglycerides  127   HDL Cholesterol  75 (A)   VLDL Cholesterol  22   LDL Cholesterol  84 66   LDL/HDL Ratio  0.83   (A) Abnormal value            TSH    TSH 10/11/21   TSH 0.667           A1C Last 3 Results    HGBA1C Last 3 Results 4/13/22   Hemoglobin A1C 5.50                     Assessment and Plan    Diagnoses and all orders for this visit:    1. Mixed hyperlipidemia (Primary)  -     Comprehensive Metabolic Panel; Future  -     LDL Cholesterol, Direct; Future  -     TSH; Future    2. Essential hypertension  -     CBC Auto Differential;  Future  -     Comprehensive Metabolic Panel; Future    3. Class 2 severe obesity due to excess calories with serious comorbidity and body mass index (BMI) of 35.0 to 35.9 in adult ([facility])    4. Impaired fasting glucose  -     Hemoglobin A1c; Future    5. Gastroesophageal reflux disease without esophagitis  -     CBC Auto Differential; Future    6. Megaloblastic anemia due to vitamin B12 deficiency  -     CBC Auto Differential; Future  -     Vitamin B12; Future    7. Osteopenia of left forearm - dx'd 12/2021. Normal hip  -     Vitamin D 25 Hydroxy; Future    8. Degeneration of cervical intervertebral disc  -     gabapentin (NEURONTIN) 800 MG tablet; 1/2 tablet in the morning and 1/2 tablet at noon and one tablet at bedtime  Dispense: 60 tablet; Refill: 5    9. Early onset Alzheimer's dementia without behavioral disturbance (HCC)    10. Positional lightheadedness    Other orders  -     phenylephrine (SUDAFED PE) 10 MG tablet; 1 po 2-3 times daily prn congestion  Dispense: 36 tablet; Refill: 0         I spent 32 minutes caring for Andria on this date of service. This time includes time spent by me in the following activities:preparing for the visit, reviewing tests, obtaining and/or reviewing a separately obtained history, performing a medically appropriate examination and/or evaluation , counseling and educating the patient/family/caregiver, ordering medications, tests, or procedures and documenting information in the medical record     I sent a prescription for phenylephrine 10 mg to take 1 po 2-3 times daily prn congestion.    Recommended daily Claritin (loratadine) 10 mg daily for seasonal allergies through peak allergy season.  Running a humidifier in the winter months will help with congestion. She can use Tylenol or Excedrin per label directions for the headache.  We can add Flonase nasal spray if symptoms persist with the plan above.  Adding saline nasal spray may also be helpful.    Slow position changes to  prevent dizziness/lightheadedness and falls, most likely a side effect from the Neurontin.  Refill sent for Neurontin to treat chronic back pain.  Patient understands the risks associated with this controlled medication, including tolerance and addiction.  She also agrees to only obtain this medication from me, and not from a another provider, unless that provider is covering for me in my absence.  She also agrees to be compliant in dosing, and not self adjust the dose of medication.  A signed controlled substance agreement is on file, and she has received a controlled substance education sheet at this a previous visit. She has also signed a consent for treatment with a controlled substance as per [facility] policy. KARO was obtained.      Continue the current doses of Aricept and Namenda for dementia and Effexor XR for HALI/depression.  Both issues are stable.    Continue to wear malleable splint on the right fifth finger.  Recommended some range of motion exercises as finger is healing.         Continue Lipitor 10 mg q.d.   Cholesterol at goal.  Continue current BP medications and monitoring.  Pursue weight loss.      Continue Prilosec for GERD, stable.     Continue the oral B-12.     Recommended COVID booster at her convenience.      Decrease carbohydrates and concentrated sweets in combination with regular exercise to help delay progression of impaired fasting glucose and to address the obesity.   I asked her to decrease making the peach cobblers from weekly to monthly.       Return in six months for routine follow up with fasting labs one week prior or sooner if needed.     Scribed for Dr. Mckeon by Cesilia Gutierrez [facility].     Follow Up   Return in about 6 months (around 10/18/2022) for Follow up in six months with labs one week prior., Next scheduled follow up - labs 1 week prior.  Patient was given instructions and counseling regarding her condition or for health maintenance advice. Please see specific  information pulled into the AVS if appropriate.

## 2022-04-19 ENCOUNTER — TELEPHONE (OUTPATIENT)
Dept: FAMILY MEDICINE CLINIC | Facility: CLINIC | Age: 67
End: 2022-04-19

## 2022-04-19 RX ORDER — LISINOPRIL AND HYDROCHLOROTHIAZIDE 20; 12.5 MG/1; MG/1
1 TABLET ORAL EVERY MORNING
Qty: 90 TABLET | Refills: 3 | Status: SHIPPED | OUTPATIENT
Start: 2022-04-19

## 2022-04-19 NOTE — TELEPHONE ENCOUNTER
lisinopril-hydrochlorothiazide (PRINZIDE,ZESTORETIC) 20-12.5 MG        [facility]

## 2022-05-11 ENCOUNTER — TELEPHONE (OUTPATIENT)
Dept: FAMILY MEDICINE CLINIC | Facility: CLINIC | Age: 67
End: 2022-05-11

## 2022-05-11 RX ORDER — TIZANIDINE 4 MG/1
4 TABLET ORAL NIGHTLY PRN
Qty: 30 TABLET | Refills: 5 | Status: SHIPPED | OUTPATIENT
Start: 2022-05-11 | End: 2022-10-24

## 2022-05-17 RX ORDER — OMEPRAZOLE 40 MG/1
40 CAPSULE, DELAYED RELEASE ORAL EVERY MORNING
Qty: 30 CAPSULE | Refills: 11 | Status: SHIPPED | OUTPATIENT
Start: 2022-05-17 | End: 2022-06-21 | Stop reason: SDUPTHER

## 2022-06-07 RX ORDER — MONTELUKAST SODIUM 10 MG/1
10 TABLET ORAL NIGHTLY
Qty: 30 TABLET | Refills: 5 | Status: SHIPPED | OUTPATIENT
Start: 2022-06-07 | End: 2022-11-09

## 2022-06-21 ENCOUNTER — TELEPHONE (OUTPATIENT)
Dept: FAMILY MEDICINE CLINIC | Facility: CLINIC | Age: 67
End: 2022-06-21

## 2022-06-21 RX ORDER — OMEPRAZOLE 40 MG/1
40 CAPSULE, DELAYED RELEASE ORAL EVERY MORNING
Qty: 30 CAPSULE | Refills: 11 | Status: SHIPPED | OUTPATIENT
Start: 2022-06-21

## 2022-08-10 ENCOUNTER — OFFICE VISIT (OUTPATIENT)
Dept: FAMILY MEDICINE CLINIC | Facility: CLINIC | Age: 67
End: 2022-08-10

## 2022-08-10 VITALS
WEIGHT: 185.2 LBS | TEMPERATURE: 98 F | HEIGHT: 59 IN | HEART RATE: 60 BPM | BODY MASS INDEX: 37.34 KG/M2 | SYSTOLIC BLOOD PRESSURE: 120 MMHG | DIASTOLIC BLOOD PRESSURE: 60 MMHG

## 2022-08-10 DIAGNOSIS — E66.01 CLASS 2 SEVERE OBESITY DUE TO EXCESS CALORIES WITH SERIOUS COMORBIDITY AND BODY MASS INDEX (BMI) OF 37.0 TO 37.9 IN ADULT: Chronic | ICD-10-CM

## 2022-08-10 DIAGNOSIS — I10 ESSENTIAL HYPERTENSION: Chronic | ICD-10-CM

## 2022-08-10 DIAGNOSIS — R29.6 FALL IN ELDERLY PATIENT: ICD-10-CM

## 2022-08-10 DIAGNOSIS — S63.642D SPRAIN OF METACARPOPHALANGEAL (MCP) JOINT OF LEFT THUMB, SUBSEQUENT ENCOUNTER: Primary | ICD-10-CM

## 2022-08-10 PROCEDURE — 99214 OFFICE O/P EST MOD 30 MIN: CPT | Performed by: INTERNAL MEDICINE

## 2022-08-10 NOTE — PROGRESS NOTES
"Chief Complaint  hand pain (Left hand/thumb. Went to Urgent care 08/06/22. She had fallen 08/04 because her cat tripped her )    Subjective        History of Present Illness     Andria Goldstein is our 67-year-old female with medical issues including hyperlipidemia, hypertension, cerebrovascular disease, early onset Alzheimer's dementia, impaired fasting glucose and vitamin B-12 deficiency among other issues who presents to the office to follow up on left hand/thumb pain.      Patient was seen at [facility] 08/06/2022 with left hand and thumb pain after a fall two days prior after tripping over her cat.  She also had a healing facial contusion and healing abrasion on left forearm.   No fracture or dislocation noted.  There were degenerative changes of carpometacarpal joint of the thumb and distal internal joints of the fingers.  She has a past history of left trigger finger.   She was placed in wrist cast and sling and recommended OTC Tylenol for the pain.  She continues to have pain and minimal swelling at the base of the left thumb.     Blood pressure is well controlled today.  It was lower at urgent care 4 days ago.  She denies orthostatic symptoms.    Objective   Vital Signs:  /60   Pulse 60   Temp 98 °F (36.7 °C)   Ht 149.9 cm (59\")   Wt 84 kg (185 lb 3.2 oz)   BMI 37.41 kg/m²   Estimated body mass index is 37.41 kg/m² as calculated from the following:    Height as of this encounter: 149.9 cm (59\").    Weight as of this encounter: 84 kg (185 lb 3.2 oz).          Physical Exam  Constitutional:       General: She is not in acute distress.     Appearance: She is well-developed.      Comments: Pleasant female. Obese.  Accompanied by her .     HENT:      Head: Normocephalic and atraumatic.      Nose: Nose normal.      Mouth/Throat:      Pharynx: No oropharyngeal exudate.   Eyes:      Pupils: Pupils are equal, round, and reactive to light.   Neck:      Thyroid: No thyromegaly.     "  Vascular: No JVD.   Cardiovascular:      Rate and Rhythm: Normal rate and regular rhythm.      Heart sounds: Normal heart sounds.   Pulmonary:      Effort: Pulmonary effort is normal. No accessory muscle usage or respiratory distress.      Breath sounds: Normal breath sounds. No wheezing or rales.   Abdominal:      General: Bowel sounds are normal. There is no distension.      Palpations: Abdomen is soft.      Tenderness: There is no abdominal tenderness.   Musculoskeletal:      Cervical back: Neck supple.      Comments: Minimal swelling at the base of the left thumb.   Lymphadenopathy:      Cervical: No cervical adenopathy.   Skin:     Comments: Healing abrasion on left forearm   Neurological:      Mental Status: She is alert and oriented to person, place, and time.      Cranial Nerves: No cranial nerve deficit.   Psychiatric:         Speech: Speech normal.         Behavior: Behavior normal.         Thought Content: Thought content normal.         Judgment: Judgment normal.            Result Review :      Data reviewed: Urgent care note          Assessment and Plan   Diagnoses and all orders for this visit:    1. Sprain of metacarpophalangeal (MCP) joint of left thumb, subsequent encounter (Primary)    2. Fall in elderly patient    3. Essential hypertension    4. Class 2 severe obesity due to excess calories with serious comorbidity and body mass index (BMI) of 37.0 to 37.9 in adult (HCC)        Future Appointments   Date Time Provider Department Center   10/26/2022  1:30 PM Christofer Mckeon MD [facility]        I spent 31 minutes caring for Andria on this date of service. This time includes time spent by me in the following activities:preparing for the visit, reviewing tests, obtaining and/or reviewing a separately obtained history, performing a medically appropriate examination and/or evaluation , counseling and educating the patient/family/caregiver, ordering medications, tests, or procedures and documenting  information in the medical record     Left hand and left thumb strain continue to improve.  Continue wearing the velcro wrist brace for an additional week with gentle ROM exercises.  Continue Tylenol for any pain.  Fall risks and precautions reviewed, especially with the cat in the home staying underfoot.    Continue diet and exercise efforts to help achieve weight loss and manage hypertension.     Blood pressure is at goal today with current meds.  No orthostatic symptoms.    Return 10/26/2022 for routine follow up with fasting labs one week prior or sooner if needed.     Scribed for Dr. Mckeon by Dayami Reynoso.     Follow Up   Return for Next scheduled follow up, Recheck.  Patient was given instructions and counseling regarding her condition or for health maintenance advice. Please see specific information pulled into the AVS if appropriate.

## 2022-09-19 ENCOUNTER — LAB (OUTPATIENT)
Dept: LAB | Facility: OTHER | Age: 67
End: 2022-09-19

## 2022-09-19 DIAGNOSIS — D53.1 MEGALOBLASTIC ANEMIA DUE TO VITAMIN B12 DEFICIENCY: Chronic | ICD-10-CM

## 2022-09-19 DIAGNOSIS — M85.832 OSTEOPENIA OF LEFT FOREARM: Chronic | ICD-10-CM

## 2022-09-19 DIAGNOSIS — I10 ESSENTIAL HYPERTENSION: Chronic | ICD-10-CM

## 2022-09-19 DIAGNOSIS — R73.01 IMPAIRED FASTING GLUCOSE: Chronic | ICD-10-CM

## 2022-09-19 DIAGNOSIS — K21.9 GASTROESOPHAGEAL REFLUX DISEASE WITHOUT ESOPHAGITIS: Chronic | ICD-10-CM

## 2022-09-19 DIAGNOSIS — E78.2 MIXED HYPERLIPIDEMIA: Chronic | ICD-10-CM

## 2022-09-19 LAB
ALBUMIN SERPL-MCNC: 4.1 G/DL (ref 3.5–5)
ALBUMIN/GLOB SERPL: 1.6 G/DL (ref 1.1–1.8)
ALP SERPL-CCNC: 112 U/L (ref 38–126)
ALT SERPL W P-5'-P-CCNC: 16 U/L
ANION GAP SERPL CALCULATED.3IONS-SCNC: 5 MMOL/L (ref 5–15)
AST SERPL-CCNC: 24 U/L (ref 14–36)
BASOPHILS # BLD AUTO: 0.03 10*3/MM3 (ref 0–0.2)
BASOPHILS NFR BLD AUTO: 0.4 % (ref 0–1.5)
BILIRUB SERPL-MCNC: 0.5 MG/DL (ref 0.2–1.3)
BUN SERPL-MCNC: 15 MG/DL (ref 7–23)
BUN/CREAT SERPL: 17.2 (ref 7–25)
CALCIUM SPEC-SCNC: 9.1 MG/DL (ref 8.4–10.2)
CHLORIDE SERPL-SCNC: 103 MMOL/L (ref 101–112)
CO2 SERPL-SCNC: 31 MMOL/L (ref 22–30)
CREAT SERPL-MCNC: 0.87 MG/DL (ref 0.52–1.04)
DEPRECATED RDW RBC AUTO: 44 FL (ref 37–54)
EGFRCR SERPLBLD CKD-EPI 2021: 72.7 ML/MIN/1.73
EOSINOPHIL # BLD AUTO: 0.19 10*3/MM3 (ref 0–0.4)
EOSINOPHIL NFR BLD AUTO: 2.8 % (ref 0.3–6.2)
ERYTHROCYTE [DISTWIDTH] IN BLOOD BY AUTOMATED COUNT: 14 % (ref 12.3–15.4)
GLOBULIN UR ELPH-MCNC: 2.6 GM/DL (ref 2.3–3.5)
GLUCOSE SERPL-MCNC: 104 MG/DL (ref 70–99)
HCT VFR BLD AUTO: 40.8 % (ref 34–46.6)
HGB BLD-MCNC: 12.8 G/DL (ref 12–15.9)
LYMPHOCYTES # BLD AUTO: 1.46 10*3/MM3 (ref 0.7–3.1)
LYMPHOCYTES NFR BLD AUTO: 21.2 % (ref 19.6–45.3)
MCH RBC QN AUTO: 27.8 PG (ref 26.6–33)
MCHC RBC AUTO-ENTMCNC: 31.4 G/DL (ref 31.5–35.7)
MCV RBC AUTO: 88.5 FL (ref 79–97)
MONOCYTES # BLD AUTO: 0.53 10*3/MM3 (ref 0.1–0.9)
MONOCYTES NFR BLD AUTO: 7.7 % (ref 5–12)
NEUTROPHILS NFR BLD AUTO: 4.68 10*3/MM3 (ref 1.7–7)
NEUTROPHILS NFR BLD AUTO: 67.9 % (ref 42.7–76)
PLATELET # BLD AUTO: 206 10*3/MM3 (ref 140–450)
PMV BLD AUTO: 12.5 FL (ref 6–12)
POTASSIUM SERPL-SCNC: 3.9 MMOL/L (ref 3.4–5)
PROT SERPL-MCNC: 6.7 G/DL (ref 6.3–8.6)
RBC # BLD AUTO: 4.61 10*6/MM3 (ref 3.77–5.28)
SODIUM SERPL-SCNC: 139 MMOL/L (ref 137–145)
WBC NRBC COR # BLD: 6.89 10*3/MM3 (ref 3.4–10.8)

## 2022-09-19 PROCEDURE — 84443 ASSAY THYROID STIM HORMONE: CPT | Performed by: INTERNAL MEDICINE

## 2022-09-19 PROCEDURE — 82607 VITAMIN B-12: CPT | Performed by: INTERNAL MEDICINE

## 2022-09-19 PROCEDURE — 36415 COLL VENOUS BLD VENIPUNCTURE: CPT | Performed by: INTERNAL MEDICINE

## 2022-09-19 PROCEDURE — 82306 VITAMIN D 25 HYDROXY: CPT | Performed by: INTERNAL MEDICINE

## 2022-09-19 PROCEDURE — 85025 COMPLETE CBC W/AUTO DIFF WBC: CPT | Performed by: INTERNAL MEDICINE

## 2022-09-19 PROCEDURE — 83036 HEMOGLOBIN GLYCOSYLATED A1C: CPT | Performed by: INTERNAL MEDICINE

## 2022-09-19 PROCEDURE — 80053 COMPREHEN METABOLIC PANEL: CPT | Performed by: INTERNAL MEDICINE

## 2022-09-19 PROCEDURE — 83721 ASSAY OF BLOOD LIPOPROTEIN: CPT | Performed by: INTERNAL MEDICINE

## 2022-09-20 LAB
25(OH)D3 SERPL-MCNC: 24.6 NG/ML (ref 30–100)
ARTICHOKE IGE QN: 76 MG/DL (ref 0–100)
HBA1C MFR BLD: 5.5 % (ref 4.8–5.6)
TSH SERPL DL<=0.05 MIU/L-ACNC: 0.72 UIU/ML (ref 0.27–4.2)
VIT B12 BLD-MCNC: 1019 PG/ML (ref 211–946)

## 2022-09-21 DIAGNOSIS — G30.0 EARLY ONSET ALZHEIMER'S DEMENTIA WITHOUT BEHAVIORAL DISTURBANCE: ICD-10-CM

## 2022-09-21 DIAGNOSIS — F02.80 EARLY ONSET ALZHEIMER'S DEMENTIA WITHOUT BEHAVIORAL DISTURBANCE: ICD-10-CM

## 2022-09-21 RX ORDER — ATORVASTATIN CALCIUM 20 MG/1
20 TABLET, FILM COATED ORAL DAILY
Qty: 30 TABLET | Refills: 11 | Status: SHIPPED | OUTPATIENT
Start: 2022-09-21

## 2022-09-21 RX ORDER — DONEPEZIL HYDROCHLORIDE 10 MG/1
10 TABLET, FILM COATED ORAL NIGHTLY
Qty: 30 TABLET | Refills: 11 | Status: SHIPPED | OUTPATIENT
Start: 2022-09-21

## 2022-10-05 RX ORDER — AMLODIPINE BESYLATE 5 MG/1
5 TABLET ORAL DAILY
Qty: 30 TABLET | Refills: 0 | Status: SHIPPED | OUTPATIENT
Start: 2022-10-05 | End: 2022-11-09

## 2022-10-07 DIAGNOSIS — F02.80 EARLY ONSET ALZHEIMER'S DEMENTIA WITHOUT BEHAVIORAL DISTURBANCE: ICD-10-CM

## 2022-10-07 DIAGNOSIS — G30.0 EARLY ONSET ALZHEIMER'S DEMENTIA WITHOUT BEHAVIORAL DISTURBANCE: ICD-10-CM

## 2022-10-07 RX ORDER — MEMANTINE HYDROCHLORIDE 10 MG/1
TABLET ORAL
Qty: 60 TABLET | Refills: 11 | Status: SHIPPED | OUTPATIENT
Start: 2022-10-07

## 2022-10-24 RX ORDER — TIZANIDINE 4 MG/1
4 TABLET ORAL NIGHTLY PRN
Qty: 30 TABLET | Refills: 5 | Status: SHIPPED | OUTPATIENT
Start: 2022-10-24

## 2022-10-26 ENCOUNTER — OFFICE VISIT (OUTPATIENT)
Dept: FAMILY MEDICINE CLINIC | Facility: CLINIC | Age: 67
End: 2022-10-26

## 2022-10-26 VITALS
DIASTOLIC BLOOD PRESSURE: 60 MMHG | TEMPERATURE: 97.5 F | HEIGHT: 59 IN | HEART RATE: 72 BPM | WEIGHT: 184.2 LBS | SYSTOLIC BLOOD PRESSURE: 128 MMHG | BODY MASS INDEX: 37.13 KG/M2

## 2022-10-26 DIAGNOSIS — E78.2 MIXED HYPERLIPIDEMIA: Primary | Chronic | ICD-10-CM

## 2022-10-26 DIAGNOSIS — F41.1 GENERALIZED ANXIETY DISORDER: Chronic | ICD-10-CM

## 2022-10-26 DIAGNOSIS — R73.01 IMPAIRED FASTING GLUCOSE: Chronic | ICD-10-CM

## 2022-10-26 DIAGNOSIS — F33.41 RECURRENT MAJOR DEPRESSIVE DISORDER, IN PARTIAL REMISSION: Chronic | ICD-10-CM

## 2022-10-26 DIAGNOSIS — I10 ESSENTIAL HYPERTENSION: Chronic | ICD-10-CM

## 2022-10-26 DIAGNOSIS — D53.1 MEGALOBLASTIC ANEMIA DUE TO VITAMIN B12 DEFICIENCY: Chronic | ICD-10-CM

## 2022-10-26 DIAGNOSIS — G30.0 EARLY ONSET ALZHEIMER'S DEMENTIA WITHOUT BEHAVIORAL DISTURBANCE: Chronic | ICD-10-CM

## 2022-10-26 DIAGNOSIS — E55.9 VITAMIN D DEFICIENCY: Chronic | ICD-10-CM

## 2022-10-26 DIAGNOSIS — K21.9 GASTROESOPHAGEAL REFLUX DISEASE WITHOUT ESOPHAGITIS: Chronic | ICD-10-CM

## 2022-10-26 DIAGNOSIS — E66.01 CLASS 2 SEVERE OBESITY DUE TO EXCESS CALORIES WITH SERIOUS COMORBIDITY AND BODY MASS INDEX (BMI) OF 37.0 TO 37.9 IN ADULT: Chronic | ICD-10-CM

## 2022-10-26 DIAGNOSIS — M50.30 DEGENERATION OF CERVICAL INTERVERTEBRAL DISC: ICD-10-CM

## 2022-10-26 DIAGNOSIS — F02.80 EARLY ONSET ALZHEIMER'S DEMENTIA WITHOUT BEHAVIORAL DISTURBANCE: Chronic | ICD-10-CM

## 2022-10-26 PROCEDURE — 90662 IIV NO PRSV INCREASED AG IM: CPT | Performed by: INTERNAL MEDICINE

## 2022-10-26 PROCEDURE — G0008 ADMIN INFLUENZA VIRUS VAC: HCPCS | Performed by: INTERNAL MEDICINE

## 2022-10-26 PROCEDURE — 99214 OFFICE O/P EST MOD 30 MIN: CPT | Performed by: INTERNAL MEDICINE

## 2022-10-26 RX ORDER — GABAPENTIN 800 MG/1
TABLET ORAL
Qty: 60 TABLET | Refills: 5 | Status: SHIPPED | OUTPATIENT
Start: 2022-10-26

## 2022-11-09 RX ORDER — MONTELUKAST SODIUM 10 MG/1
10 TABLET ORAL NIGHTLY
Qty: 30 TABLET | Refills: 5 | Status: SHIPPED | OUTPATIENT
Start: 2022-11-09

## 2022-11-09 RX ORDER — AMLODIPINE BESYLATE 5 MG/1
5 TABLET ORAL DAILY
Qty: 30 TABLET | Refills: 5 | Status: SHIPPED | OUTPATIENT
Start: 2022-11-09

## 2023-01-16 ENCOUNTER — OFFICE VISIT (OUTPATIENT)
Dept: FAMILY MEDICINE CLINIC | Facility: CLINIC | Age: 68
End: 2023-01-16
Payer: MEDICARE

## 2023-01-16 VITALS
BODY MASS INDEX: 34.47 KG/M2 | DIASTOLIC BLOOD PRESSURE: 60 MMHG | OXYGEN SATURATION: 95 % | HEIGHT: 59 IN | WEIGHT: 171 LBS | SYSTOLIC BLOOD PRESSURE: 110 MMHG | HEART RATE: 70 BPM | TEMPERATURE: 98.8 F

## 2023-01-16 DIAGNOSIS — J45.21 MILD INTERMITTENT ASTHMA WITH ACUTE EXACERBATION: ICD-10-CM

## 2023-01-16 DIAGNOSIS — J10.1 INFLUENZA B: Primary | ICD-10-CM

## 2023-01-16 DIAGNOSIS — I10 ESSENTIAL HYPERTENSION: Chronic | ICD-10-CM

## 2023-01-16 DIAGNOSIS — J06.9 VIRAL URI WITH COUGH: ICD-10-CM

## 2023-01-16 DIAGNOSIS — E66.09 CLASS 1 OBESITY DUE TO EXCESS CALORIES WITH SERIOUS COMORBIDITY AND BODY MASS INDEX (BMI) OF 34.0 TO 34.9 IN ADULT: Chronic | ICD-10-CM

## 2023-01-16 PROBLEM — F32.A DEPRESSIVE DISORDER: Status: ACTIVE | Noted: 2023-01-08

## 2023-01-16 PROBLEM — F41.9 ANXIETY: Status: ACTIVE | Noted: 2023-01-08

## 2023-01-16 PROCEDURE — 99214 OFFICE O/P EST MOD 30 MIN: CPT | Performed by: INTERNAL MEDICINE

## 2023-01-16 RX ORDER — ALBUTEROL SULFATE 90 UG/1
2 AEROSOL, METERED RESPIRATORY (INHALATION) EVERY 4 HOURS PRN
Qty: 18 G | Refills: 0 | Status: SHIPPED | OUTPATIENT
Start: 2023-01-16 | End: 2023-01-16 | Stop reason: SDUPTHER

## 2023-01-16 RX ORDER — DEXTROMETHORPHAN HYDROBROMIDE AND PROMETHAZINE HYDROCHLORIDE 15; 6.25 MG/5ML; MG/5ML
SYRUP ORAL
COMMUNITY
Start: 2023-01-08

## 2023-01-16 RX ORDER — ALBUTEROL SULFATE 90 UG/1
2 AEROSOL, METERED RESPIRATORY (INHALATION) EVERY 4 HOURS PRN
Qty: 18 G | Refills: 0 | Status: SHIPPED | OUTPATIENT
Start: 2023-01-16

## 2023-01-16 NOTE — PROGRESS NOTES
"Chief Complaint  Follow-up (Urgent care 01/08/223 for flu B. She had been since x 1 week before going to Urgent care.She was given Phenergan DM for cough. She feels better but continues to have a cough)    Subjective        History of Present Illness     Andria Goldstein presents to the office for follow up from [facility] 01/08/2023 where she was diagnosed with influenza type B.  She was prescribed promethazine for the cough and also has an OTC cough suppressant.  Overall, she reports improvement, although, she continues to have persistent cough with some wheezing.  She has used an inhaler in the past.         Objective   Vital Signs:  /60   Pulse 70   Temp 98.8 °F (37.1 °C)   Ht 149.9 cm (59\")   Wt 77.6 kg (171 lb)   SpO2 95%   BMI 34.54 kg/m²   Estimated body mass index is 34.54 kg/m² as calculated from the following:    Height as of this encounter: 149.9 cm (59\").    Weight as of this encounter: 77.6 kg (171 lb).             Physical Exam  Constitutional:       General: She is not in acute distress.     Appearance: She is well-developed.      Comments: Pleasant female, obese.  Accompanied by her , Edgar.    HENT:      Head: Normocephalic and atraumatic.      Nose: Nose normal.      Mouth/Throat:      Pharynx: No oropharyngeal exudate.   Eyes:      Pupils: Pupils are equal, round, and reactive to light.   Neck:      Thyroid: No thyromegaly.      Vascular: No JVD.   Cardiovascular:      Rate and Rhythm: Normal rate and regular rhythm.      Heart sounds: Normal heart sounds.   Pulmonary:      Effort: Pulmonary effort is normal. No accessory muscle usage or respiratory distress.      Breath sounds: Normal breath sounds. No wheezing or rales.      Comments: Increased bronchial sounds and wheezing.   Abdominal:      General: Bowel sounds are normal. There is no distension.      Palpations: Abdomen is soft.      Tenderness: There is no abdominal tenderness.   Musculoskeletal: "      Cervical back: Neck supple.   Lymphadenopathy:      Cervical: No cervical adenopathy.   Neurological:      Mental Status: She is alert and oriented to person, place, and time.      Cranial Nerves: No cranial nerve deficit.   Psychiatric:         Speech: Speech normal.         Behavior: Behavior normal.         Thought Content: Thought content normal.         Judgment: Judgment normal.            Result Review :      Data reviewed: [facility] 01/08/2023    Future Appointments   Date Time Provider Department Center   5/2/2023  2:30 PM Christofer Mckeon MD [facility]            Assessment and Plan   Diagnoses and all orders for this visit:    1. Influenza B (Primary)    2. Viral URI with cough    3. Mild intermittent asthma with acute exacerbation    4. Essential hypertension    5. Class 1 obesity due to excess calories with serious comorbidity and body mass index (BMI) of 34.0 to 34.9 in adult    Other orders  -     Discontinue: albuterol sulfate  (90 Base) MCG/ACT inhaler; Inhale 2 puffs Every 4 (Four) Hours As Needed for Wheezing (Cough).  Dispense: 18 g; Refill: 0                For the mild intermittent asthma exacerbation post influenza B, prescription sent for albuterol inhaler to use q. 4h. p.r.n. wheezing.  She can continue the promethazine cough medication at night if needed or OTC cough suppressant.    She declines Tessalon Perles.    Blood pressure is improved with weight loss.  She has lost 13 pounds in the past 3 months, although BMI is still far above goal at 34.5.  Continue current blood pressure medications, but I may be able to reduce or stop Norvasc in the future.  Continue dietary changes and weight loss efforts.    Return in May for routine follow up with fasting labs one week prior or sooner if needed.      Scribed for Dr. Mckeon by Cesilia Gutierrez [facility].     Follow Up   Return if symptoms worsen or fail to improve, for Next scheduled follow up.  Patient was given  instructions and counseling regarding her condition or for health maintenance advice. Please see specific information pulled into the AVS if appropriate.

## 2023-03-03 DIAGNOSIS — I10 ESSENTIAL HYPERTENSION: ICD-10-CM

## 2023-03-03 RX ORDER — CARVEDILOL 6.25 MG/1
6.25 TABLET ORAL 2 TIMES DAILY WITH MEALS
Qty: 60 TABLET | Refills: 11 | Status: SHIPPED | OUTPATIENT
Start: 2023-03-03

## 2023-03-21 RX ORDER — VENLAFAXINE HYDROCHLORIDE 150 MG/1
150 CAPSULE, EXTENDED RELEASE ORAL DAILY
Qty: 90 CAPSULE | Refills: 3 | Status: SHIPPED | OUTPATIENT
Start: 2023-03-21

## 2023-04-17 RX ORDER — AMLODIPINE BESYLATE 5 MG/1
5 TABLET ORAL DAILY
Qty: 30 TABLET | Refills: 5 | Status: SHIPPED | OUTPATIENT
Start: 2023-04-17

## 2023-04-25 ENCOUNTER — LAB (OUTPATIENT)
Dept: LAB | Facility: OTHER | Age: 68
End: 2023-04-25
Payer: MEDICARE

## 2023-04-25 DIAGNOSIS — K21.9 GASTROESOPHAGEAL REFLUX DISEASE WITHOUT ESOPHAGITIS: Chronic | ICD-10-CM

## 2023-04-25 DIAGNOSIS — E78.2 MIXED HYPERLIPIDEMIA: Chronic | ICD-10-CM

## 2023-04-25 DIAGNOSIS — E66.01 CLASS 2 SEVERE OBESITY DUE TO EXCESS CALORIES WITH SERIOUS COMORBIDITY AND BODY MASS INDEX (BMI) OF 37.0 TO 37.9 IN ADULT: Chronic | ICD-10-CM

## 2023-04-25 DIAGNOSIS — I10 ESSENTIAL HYPERTENSION: Chronic | ICD-10-CM

## 2023-04-25 DIAGNOSIS — R73.01 IMPAIRED FASTING GLUCOSE: Chronic | ICD-10-CM

## 2023-04-25 DIAGNOSIS — E55.9 VITAMIN D DEFICIENCY: Chronic | ICD-10-CM

## 2023-04-25 DIAGNOSIS — D53.1 MEGALOBLASTIC ANEMIA DUE TO VITAMIN B12 DEFICIENCY: Chronic | ICD-10-CM

## 2023-04-25 LAB
ALBUMIN SERPL-MCNC: 4.4 G/DL (ref 3.5–5)
ALBUMIN/GLOB SERPL: 1.5 G/DL (ref 1.1–1.8)
ALP SERPL-CCNC: 114 U/L (ref 38–126)
ALT SERPL W P-5'-P-CCNC: 16 U/L
ANION GAP SERPL CALCULATED.3IONS-SCNC: 6 MMOL/L (ref 5–15)
AST SERPL-CCNC: 20 U/L (ref 14–36)
BASOPHILS # BLD AUTO: 0.02 10*3/MM3 (ref 0–0.2)
BASOPHILS NFR BLD AUTO: 0.3 % (ref 0–1.5)
BILIRUB SERPL-MCNC: 0.7 MG/DL (ref 0.2–1.3)
BUN SERPL-MCNC: 20 MG/DL (ref 7–23)
BUN/CREAT SERPL: 22 (ref 7–25)
CALCIUM SPEC-SCNC: 9.4 MG/DL (ref 8.4–10.2)
CHLORIDE SERPL-SCNC: 104 MMOL/L (ref 101–112)
CHOLEST SERPL-MCNC: 194 MG/DL (ref 150–200)
CO2 SERPL-SCNC: 31 MMOL/L (ref 22–30)
CREAT SERPL-MCNC: 0.91 MG/DL (ref 0.52–1.04)
DEPRECATED RDW RBC AUTO: 45.7 FL (ref 37–54)
EGFRCR SERPLBLD CKD-EPI 2021: 68.4 ML/MIN/1.73
EOSINOPHIL # BLD AUTO: 0.11 10*3/MM3 (ref 0–0.4)
EOSINOPHIL NFR BLD AUTO: 1.7 % (ref 0.3–6.2)
ERYTHROCYTE [DISTWIDTH] IN BLOOD BY AUTOMATED COUNT: 14.4 % (ref 12.3–15.4)
GLOBULIN UR ELPH-MCNC: 2.9 GM/DL (ref 2.3–3.5)
GLUCOSE SERPL-MCNC: 111 MG/DL (ref 70–99)
HCT VFR BLD AUTO: 43.1 % (ref 34–46.6)
HDLC SERPL-MCNC: 76 MG/DL (ref 40–59)
HGB BLD-MCNC: 13.7 G/DL (ref 12–15.9)
LDLC SERPL CALC-MCNC: 94 MG/DL
LDLC/HDLC SERPL: 1.19 {RATIO} (ref 0–3.22)
LYMPHOCYTES # BLD AUTO: 1.2 10*3/MM3 (ref 0.7–3.1)
LYMPHOCYTES NFR BLD AUTO: 18.8 % (ref 19.6–45.3)
MCH RBC QN AUTO: 28.1 PG (ref 26.6–33)
MCHC RBC AUTO-ENTMCNC: 31.8 G/DL (ref 31.5–35.7)
MCV RBC AUTO: 88.3 FL (ref 79–97)
MONOCYTES # BLD AUTO: 0.39 10*3/MM3 (ref 0.1–0.9)
MONOCYTES NFR BLD AUTO: 6.1 % (ref 5–12)
NEUTROPHILS NFR BLD AUTO: 4.67 10*3/MM3 (ref 1.7–7)
NEUTROPHILS NFR BLD AUTO: 73.1 % (ref 42.7–76)
PLATELET # BLD AUTO: 191 10*3/MM3 (ref 140–450)
PMV BLD AUTO: 12.8 FL (ref 6–12)
POTASSIUM SERPL-SCNC: 4.1 MMOL/L (ref 3.4–5)
PROT SERPL-MCNC: 7.3 G/DL (ref 6.3–8.6)
RBC # BLD AUTO: 4.88 10*6/MM3 (ref 3.77–5.28)
SODIUM SERPL-SCNC: 141 MMOL/L (ref 137–145)
TRIGL SERPL-MCNC: 139 MG/DL
VLDLC SERPL-MCNC: 24 MG/DL (ref 5–40)
WBC NRBC COR # BLD: 6.39 10*3/MM3 (ref 3.4–10.8)

## 2023-04-25 PROCEDURE — 82607 VITAMIN B-12: CPT | Performed by: INTERNAL MEDICINE

## 2023-04-25 PROCEDURE — 82306 VITAMIN D 25 HYDROXY: CPT | Performed by: INTERNAL MEDICINE

## 2023-04-25 PROCEDURE — 83036 HEMOGLOBIN GLYCOSYLATED A1C: CPT | Performed by: INTERNAL MEDICINE

## 2023-04-25 PROCEDURE — 36415 COLL VENOUS BLD VENIPUNCTURE: CPT | Performed by: INTERNAL MEDICINE

## 2023-04-25 PROCEDURE — 80061 LIPID PANEL: CPT | Performed by: INTERNAL MEDICINE

## 2023-04-25 PROCEDURE — 85025 COMPLETE CBC W/AUTO DIFF WBC: CPT | Performed by: INTERNAL MEDICINE

## 2023-04-25 PROCEDURE — 80053 COMPREHEN METABOLIC PANEL: CPT | Performed by: INTERNAL MEDICINE

## 2023-04-26 LAB
25(OH)D3 SERPL-MCNC: 29.5 NG/ML (ref 30–100)
HBA1C MFR BLD: 5.5 % (ref 4.8–5.6)
VIT B12 BLD-MCNC: 707 PG/ML (ref 211–946)

## 2023-05-02 ENCOUNTER — OFFICE VISIT (OUTPATIENT)
Dept: FAMILY MEDICINE CLINIC | Facility: CLINIC | Age: 68
End: 2023-05-02
Payer: MEDICARE

## 2023-05-02 VITALS
SYSTOLIC BLOOD PRESSURE: 118 MMHG | HEART RATE: 72 BPM | OXYGEN SATURATION: 97 % | TEMPERATURE: 96.9 F | DIASTOLIC BLOOD PRESSURE: 68 MMHG | WEIGHT: 175.8 LBS | HEIGHT: 59 IN | BODY MASS INDEX: 35.44 KG/M2

## 2023-05-02 DIAGNOSIS — G30.0 EARLY ONSET ALZHEIMER'S DEMENTIA WITHOUT BEHAVIORAL DISTURBANCE: Chronic | ICD-10-CM

## 2023-05-02 DIAGNOSIS — M53.86 DISORDER OF LUMBAR SPINE: Chronic | ICD-10-CM

## 2023-05-02 DIAGNOSIS — R73.01 IMPAIRED FASTING GLUCOSE: ICD-10-CM

## 2023-05-02 DIAGNOSIS — M85.832 OSTEOPENIA OF LEFT FOREARM: ICD-10-CM

## 2023-05-02 DIAGNOSIS — L82.1 SK (SEBORRHEIC KERATOSIS): ICD-10-CM

## 2023-05-02 DIAGNOSIS — Z23 NEED FOR SHINGLES VACCINE: ICD-10-CM

## 2023-05-02 DIAGNOSIS — D53.1 MEGALOBLASTIC ANEMIA DUE TO VITAMIN B12 DEFICIENCY: Chronic | ICD-10-CM

## 2023-05-02 DIAGNOSIS — K21.9 GASTROESOPHAGEAL REFLUX DISEASE WITHOUT ESOPHAGITIS: Chronic | ICD-10-CM

## 2023-05-02 DIAGNOSIS — M50.30 DEGENERATION OF CERVICAL INTERVERTEBRAL DISC: Chronic | ICD-10-CM

## 2023-05-02 DIAGNOSIS — I10 ESSENTIAL HYPERTENSION: Chronic | ICD-10-CM

## 2023-05-02 DIAGNOSIS — L57.0 AK (ACTINIC KERATOSIS): ICD-10-CM

## 2023-05-02 DIAGNOSIS — F41.1 GENERALIZED ANXIETY DISORDER: Chronic | ICD-10-CM

## 2023-05-02 DIAGNOSIS — F02.80 EARLY ONSET ALZHEIMER'S DEMENTIA WITHOUT BEHAVIORAL DISTURBANCE: Chronic | ICD-10-CM

## 2023-05-02 DIAGNOSIS — Z00.00 MEDICARE ANNUAL WELLNESS VISIT, SUBSEQUENT: Primary | ICD-10-CM

## 2023-05-02 DIAGNOSIS — E55.9 VITAMIN D DEFICIENCY: ICD-10-CM

## 2023-05-02 DIAGNOSIS — E78.2 MIXED HYPERLIPIDEMIA: Chronic | ICD-10-CM

## 2023-05-02 RX ORDER — GABAPENTIN 800 MG/1
TABLET ORAL
Qty: 60 TABLET | Refills: 5 | Status: SHIPPED | OUTPATIENT
Start: 2023-05-02

## 2023-05-02 NOTE — PATIENT INSTRUCTIONS
Medicare Wellness  Personal Prevention Plan of Service     Date of Office Visit:    Encounter Provider:  Christofer Mckeon MD  Place of Service:  [facility]  Patient Name: Andria Goldstein  :  1955    As part of the Medicare Wellness portion of your visit today, we are providing you with this personalized preventive plan of services (PPPS). This plan is based upon recommendations of the [place] Preventive Services Task Force (USPSTF) and the Advisory Committee on Immunization Practices (ACIP).    This lists the preventive care services that should be considered, and provides dates of when you are due. Items listed as completed are up-to-date and do not require any further intervention.    Health Maintenance   Topic Date Due    ZOSTER VACCINE (1 of 2) Never done    COVID-19 Vaccine (3 - Booster) 2021    ANNUAL WELLNESS VISIT  2023    INFLUENZA VACCINE  2023    MAMMOGRAM  2023    DXA SCAN  2023    LIPID PANEL  2024    COLORECTAL CANCER SCREENING  2026    TDAP/TD VACCINES (3 - Td or Tdap) 2033    HEPATITIS C SCREENING  Completed    Pneumococcal Vaccine 65+  Completed    PAP SMEAR  Discontinued       No orders of the defined types were placed in this encounter.      Return in about 6 months (around 2023) for Follow up in six months with labs one week prior..        Exercising to Lose Weight  Getting regular exercise is important for everyone. It is especially important if you are overweight. Being overweight increases your risk of heart disease, stroke, diabetes, high blood pressure, and several types of cancer. Exercising, and reducing the calories you consume, can help you lose weight and improve fitness and health.  Exercise can be moderate or vigorous intensity. To lose weight, most people need to do a certain amount of moderate or vigorous-intensity exercise each week.  How can exercise affect  me?  You lose weight when you exercise enough to burn more calories than you eat. Exercise also reduces body fat and builds muscle. The more muscle you have, the more calories you burn. Exercise also:  Improves mood.  Reduces stress and tension.  Improves your overall fitness, flexibility, and endurance.  Increases bone strength.  Moderate-intensity exercise  Moderate-intensity exercise is any activity that gets you moving enough to burn at least three times more energy (calories) than if you were sitting.  Examples of moderate exercise include:  Walking a mile in 15 minutes.  Doing light yard work.  Biking at an easy pace.  Most people should get at least 150 minutes of moderate-intensity exercise a week to maintain their body weight.  Vigorous-intensity exercise  Vigorous-intensity exercise is any activity that gets you moving enough to burn at least six times more calories than if you were sitting. When you exercise at this intensity, you should be working hard enough that you are not able to carry on a conversation.  Examples of vigorous exercise include:  Running.  Playing a team sport, such as football, basketball, and soccer.  Jumping rope.  Most people should get at least 75 minutes a week of vigorous exercise to maintain their body weight.  What actions can I take to lose weight?  The amount of exercise you need to lose weight depends on:  Your age.  The type of exercise.  Any health conditions you have.  Your overall physical ability.  Talk to your health care provider about how much exercise you need and what types of activities are safe for you.  Nutrition    Make changes to your diet as told by your health care provider or diet and nutrition specialist (dietitian). This may include:  Eating fewer calories.  Eating more protein.  Eating less unhealthy fats.  Eating a diet that includes fresh fruits and vegetables, whole grains, low-fat dairy products, and lean protein.  Avoiding foods with added fat,  salt, and sugar.  Drink plenty of water while you exercise to prevent dehydration or heat stroke.  Activity  Choose an activity that you enjoy and set realistic goals. Your health care provider can help you make an exercise plan that works for you.  Exercise at a moderate or vigorous intensity most days of the week.  The intensity of exercise may vary from person to person. You can tell how intense a workout is for you by paying attention to your breathing and heartbeat. Most people will notice their breathing and heartbeat get faster with more intense exercise.  Do resistance training twice each week, such as:  Push-ups.  Sit-ups.  Lifting weights.  Using resistance bands.  Getting short amounts of exercise can be just as helpful as long, structured periods of exercise. If you have trouble finding time to exercise, try doing these things as part of your daily routine:  Get up, stretch, and walk around every 30 minutes throughout the day.  Go for a walk during your lunch break.  Park your car farther away from your destination.  If you take public transportation, get off one stop early and walk the rest of the way.  Make phone calls while standing up and walking around.  Take the stairs instead of elevators or escalators.  Wear comfortable clothes and shoes with good support.  Do not exercise so much that you hurt yourself, feel dizzy, or get very short of breath.  Where to find more information  [place] Department of Health and Human Services: www.hhs.gov  Centers for Disease Control and Prevention: www.cdc.gov  Contact a health care provider:  Before starting a new exercise program.  If you have questions or concerns about your weight.  If you have a medical problem that keeps you from exercising.  Get help right away if:  You have any of the following while exercising:  Injury.  Dizziness.  Difficulty breathing or shortness of breath that does not go away when you stop exercising.  Chest pain.  Rapid  heartbeat.  These symptoms may represent a serious problem that is an emergency. Do not wait to see if the symptoms will go away. Get medical help right away. Call your local emergency services (911 in the [place] Do not drive yourself to the hospital.  Summary  Getting regular exercise is especially important if you are overweight.  Being overweight increases your risk of heart disease, stroke, diabetes, high blood pressure, and several types of cancer.  Losing weight happens when you burn more calories than you eat.  Reducing the amount of calories you eat, and getting regular moderate or vigorous exercise each week, helps you lose weight.  This information is not intended to replace advice given to you by your health care provider. Make sure you discuss any questions you have with your health care provider.  Document Revised: 02/13/2022 Document Reviewed: 02/13/2022  [employer] Patient Education © 2022 [employer]  Calorie Counting for Weight Loss  Calories are units of energy. Your body needs a certain number of calories from food to keep going throughout the day. When you eat or drink more calories than your body needs, your body stores the extra calories mostly as fat. When you eat or drink fewer calories than your body needs, your body burns fat to get the energy it needs.  Calorie counting means keeping track of how many calories you eat and drink each day. Calorie counting can be helpful if you need to lose weight. If you eat fewer calories than your body needs, you should lose weight. Ask your health care provider what a healthy weight is for you.  For calorie counting to work, you will need to eat the right number of calories each day to lose a healthy amount of weight per week. A dietitian can help you figure out how many calories you need in a day and will suggest ways to reach your calorie goal.  A healthy amount of weight to lose each week is usually 1-2 lb (0.5-0.9 kg). This usually means that your  daily calorie intake should be reduced by 500-750 calories.  Eating 1,200-1,500 calories a day can help most women lose weight.  Eating 1,500-1,800 calories a day can help most men lose weight.  What do I need to know about calorie counting?  Work with your health care provider or dietitian to determine how many calories you should get each day. To meet your daily calorie goal, you will need to:  Find out how many calories are in each food that you would like to eat. Try to do this before you eat.  Decide how much of the food you plan to eat.  Keep a food log. Do this by writing down what you ate and how many calories it had.  To successfully lose weight, it is important to balance calorie counting with a healthy lifestyle that includes regular activity.  Where do I find calorie information?  The number of calories in a food can be found on a Nutrition Facts label. If a food does not have a Nutrition Facts label, try to look up the calories online or ask your dietitian for help.  Remember that calories are listed per serving. If you choose to have more than one serving of a food, you will have to multiply the calories per serving by the number of servings you plan to eat. For example, the label on a package of bread might say that a serving size is 1 slice and that there are 90 calories in a serving. If you eat 1 slice, you will have eaten 90 calories. If you eat 2 slices, you will have eaten 180 calories.  How do I keep a food log?  After each time that you eat, record the following in your food log as soon as possible:  What you ate. Be sure to include toppings, sauces, and other extras on the food.  How much you ate. This can be measured in cups, ounces, or number of items.  How many calories were in each food and drink.  The total number of calories in the food you ate.  Keep your food log near you, such as in a pocket-sized notebook or on an [employer] or website on your mobile phone. Some programs will calculate  calories for you and show you how many calories you have left to meet your daily goal.  What are some portion-control tips?  Know how many calories are in a serving. This will help you know how many servings you can have of a certain food.  Use a measuring cup to measure serving sizes. You could also try weighing out portions on a kitchen scale. With time, you will be able to estimate serving sizes for some foods.  Take time to put servings of different foods on your favorite plates or in your favorite bowls and cups so you know what a serving looks like.  Try not to eat straight from a food's packaging, such as from a bag or box. Eating straight from the package makes it hard to see how much you are eating and can lead to overeating. Put the amount you would like to eat in a cup or on a plate to make sure you are eating the right portion.  Use smaller plates, glasses, and bowls for smaller portions and to prevent overeating.  Try not to multitask. For example, avoid watching TV or using your computer while eating. If it is time to eat, sit down at a table and enjoy your food. This will help you recognize when you are full. It will also help you be more mindful of what and how much you are eating.  What are tips for following this plan?  Reading food labels  Check the calorie count compared with the serving size. The serving size may be smaller than what you are used to eating.  Check the source of the calories. Try to choose foods that are high in protein, fiber, and vitamins, and low in saturated fat, trans fat, and sodium.  Shopping  Read nutrition labels while you shop. This will help you make healthy decisions about which foods to buy.  Pay attention to nutrition labels for low-fat or fat-free foods. These foods sometimes have the same number of calories or more calories than the full-fat versions. They also often have added sugar, starch, or salt to make up for flavor that was removed with the fat.  Make a  grocery list of lower-calorie foods and stick to it.  Cooking  Try to cook your favorite foods in a healthier way. For example, try baking instead of frying.  Use low-fat dairy products.  Meal planning  Use more fruits and vegetables. One-half of your plate should be fruits and vegetables.  Include lean proteins, such as chicken, turkey, and fish.  Lifestyle  Each week, aim to do one of the followin minutes of moderate exercise, such as walking.  75 minutes of vigorous exercise, such as running.  General information  Know how many calories are in the foods you eat most often. This will help you calculate calorie counts faster.  Find a way of tracking calories that works for you. Get creative. Try different apps or programs if writing down calories does not work for you.  What foods should I eat?    Eat nutritious foods. It is better to have a nutritious, high-calorie food, such as an avocado, than a food with few nutrients, such as a bag of potato chips.  Use your calories on foods and drinks that will fill you up and will not leave you hungry soon after eating.  Examples of foods that fill you up are nuts and nut butters, vegetables, lean proteins, and high-fiber foods such as whole grains. High-fiber foods are foods with more than 5 g of fiber per serving.  Pay attention to calories in drinks. Low-calorie drinks include water and unsweetened drinks.  The items listed above may not be a complete list of foods and beverages you can eat. Contact a dietitian for more information.  What foods should I limit?  Limit foods or drinks that are not good sources of vitamins, minerals, or protein or that are high in unhealthy fats. These include:  Candy.  Other sweets.  Sodas, specialty coffee drinks, alcohol, and juice.  The items listed above may not be a complete list of foods and beverages you should avoid. Contact a dietitian for more information.  How do I count calories when eating out?  Pay attention to  portions. Often, portions are much larger when eating out. Try these tips to keep portions smaller:  Consider sharing a meal instead of getting your own.  If you get your own meal, eat only half of it. Before you start eating, ask for a container and put half of your meal into it.  When available, consider ordering smaller portions from the menu instead of full portions.  Pay attention to your food and drink choices. Knowing the way food is cooked and what is included with the meal can help you eat fewer calories.  If calories are listed on the menu, choose the lower-calorie options.  Choose dishes that include vegetables, fruits, whole grains, low-fat dairy products, and lean proteins.  Choose items that are boiled, broiled, grilled, or steamed. Avoid items that are buttered, battered, fried, or served with cream sauce. Items labeled as crispy are usually fried, unless stated otherwise.  Choose water, low-fat milk, unsweetened iced tea, or other drinks without added sugar. If you want an alcoholic beverage, choose a lower-calorie option, such as a glass of wine or light beer.  Ask for dressings, sauces, and syrups on the side. These are usually high in calories, so you should limit the amount you eat.  If you want a salad, choose a garden salad and ask for grilled meats. Avoid extra toppings such as saleem, cheese, or fried items. Ask for the dressing on the side, or ask for olive oil and vinegar or lemon to use as dressing.  Estimate how many servings of a food you are given. Knowing serving sizes will help you be aware of how much food you are eating at restaurants.  Where to find more information  Centers for Disease Control and Prevention: www.cdc.gov  [place] Department of Agriculture: myplate.gov  Summary  Calorie counting means keeping track of how many calories you eat and drink each day. If you eat fewer calories than your body needs, you should lose weight.  A healthy amount of weight to lose per week is  usually 1-2 lb (0.5-0.9 kg). This usually means reducing your daily calorie intake by 500-750 calories.  The number of calories in a food can be found on a Nutrition Facts label. If a food does not have a Nutrition Facts label, try to look up the calories online or ask your dietitian for help.  Use smaller plates, glasses, and bowls for smaller portions and to prevent overeating.  Use your calories on foods and drinks that will fill you up and not leave you hungry shortly after a meal.  This information is not intended to replace advice given to you by your health care provider. Make sure you discuss any questions you have with your health care provider.  Document Revised: 01/28/2021 Document Reviewed: 01/28/2021  [employer] Patient Education © 2022 [employer]

## 2023-05-02 NOTE — PROGRESS NOTES
The ABCs of the Annual Wellness Visit  Subsequent Medicare Wellness Visit    Subjective    Andria Goldstein is a 68 y.o. female who presents for a Subsequent Medicare Wellness Visit.    The following portions of the patient's history were reviewed and   updated as appropriate: allergies, current medications, past family history, past medical history, past social history, past surgical history and problem list.    Compared to one year ago, the patient feels her physical   health is better.    Compared to one year ago, the patient feels her mental   health is better.    Recent Hospitalizations:  She was not admitted to the hospital during the last year.       Current Medical Providers:  Patient Care Team:  Christofer Mckeon MD as PCP - General (Internal Medicine)    Outpatient Medications Prior to Visit   Medication Sig Dispense Refill   • albuterol sulfate  (90 Base) MCG/ACT inhaler Inhale 2 puffs Every 4 (Four) Hours As Needed for Wheezing (Cough). 18 g 0   • amLODIPine (NORVASC) 5 MG tablet TAKE 1 TABLET BY MOUTH DAILY. 30 tablet 5   • aspirin 81 MG tablet Take 1 tablet by mouth Daily.     • atorvastatin (LIPITOR) 20 MG tablet TAKE 1 TABLET BY MOUTH DAILY. 30 tablet 11   • carvedilol (COREG) 6.25 MG tablet TAKE 1 TABLET BY MOUTH 2 (TWO) TIMES A DAY WITH MEALS. 60 tablet 11   • dicyclomine (BENTYL) 20 MG tablet Take 1 tablet by mouth 3 (Three) Times a Day As Needed (diarrhea/cramping). CAUTION diarrhea 90 tablet 5   • diphenoxylate-atropine (LOMOTIL) 2.5-0.025 MG per tablet Take 1 tablet by mouth 4 (Four) Times a Day As Needed for Diarrhea. 30 tablet 0   • donepezil (ARICEPT) 10 MG tablet TAKE 1 TABLET BY MOUTH EVERY NIGHT. 30 tablet 11   • fluticasone (FLONASE) 50 MCG/ACT nasal spray 1 spray into the nostril(s) as directed by provider 2 (Two) Times a Day. Administer 1 spray in each nostril twice daily. 1 bottle 11   • lisinopril-hydrochlorothiazide (PRINZIDE,ZESTORETIC) 20-12.5 MG per tablet Take 1 tablet  by mouth Every Morning. For blood pressure. 90 tablet 3   • loratadine (CLARITIN) 10 MG tablet Take 1 tablet by mouth Daily As Needed for Allergies.     • memantine (NAMENDA) 10 MG tablet TAKE 1 TABLET BY MOUTH TWICE DAILY. 60 tablet 11   • montelukast (SINGULAIR) 10 MG tablet TAKE 1 TABLET BY MOUTH EVERY NIGHT. 30 tablet 5   • omeprazole (priLOSEC) 40 MG capsule Take 1 capsule by mouth Every Morning. 30 capsule 11   • ondansetron (ZOFRAN) 4 MG tablet Take 1 tablet by mouth Every 8 (Eight) Hours As Needed for Nausea or Vomiting. 30 tablet 1   • phenylephrine (SUDAFED PE) 10 MG tablet 1 po 2-3 times daily prn congestion 36 tablet 0   • tiZANidine (ZANAFLEX) 4 MG tablet TAKE 1 TABLET BY MOUTH AT NIGHT AS NEEDED FOR MUSCLE SPASMS. 30 tablet 5   • venlafaxine XR (EFFEXOR-XR) 150 MG 24 hr capsule TAKE 1 CAPSULE BY MOUTH DAILY. 90 capsule 3   • vitamin B-12 (CYANOCOBALAMIN) 500 MCG tablet Take  by mouth. Take 1 tablet every other day.     • gabapentin (NEURONTIN) 800 MG tablet 1/2 tablet in the morning and 1/2 tablet at noon and one tablet at bedtime 60 tablet 5   • cholecalciferol (Vitamin D) 25 MCG (1000 UT) tablet Take 2 tablets by mouth Daily.     • promethazine-dextromethorphan (PROMETHAZINE-DM) 6.25-15 MG/5ML syrup TAKE 5 ML BY MOUTH FOUR TIMES DAILY FOR UP TO 7 DAYS AS NEEDED       No facility-administered medications prior to visit.       No opioid medication identified on active medication list. I have reviewed chart for other potential  high risk medication/s and harmful drug interactions in the elderly.          Aspirin is on active medication list. Aspirin use is indicated based on review of current medical condition/s. Pros and cons of this therapy have been discussed today. Benefits of this medication outweigh potential harm.  Patient has been encouraged to continue taking this medication.  .      Patient Active Problem List   Diagnosis   • Vaginitis and vulvovaginitis   • Umbilical hernia without  "obstruction or gangrene   • Spasm of back muscles   • Osteoarthritis of knees, bilateral   • Osteoarthritis   • Obesity due to excess calories with serious comorbidity   • Mild intermittent asthma without complication   • Melena   • Megaloblastic anemia due to vitamin B12 deficiency   • Malaise and fatigue   • Lumbar radiculopathy   • Chronic bilateral low back pain with bilateral sciatica   • Impaired fasting glucose   • Mixed hyperlipidemia   • History of colon polyps   • Generalized anxiety disorder   • Generalized abdominal pain   • Gastroesophageal reflux disease   • Gastritis   • Essential hypertension   • Disorder of lumbar spine   • Disc disorder of lumbar region   • Diarrhea   • Degeneration of thoracic intervertebral disc   • Degeneration of cervical intervertebral disc   • Chronic headache disorder   • Cerebrovascular disease   • Candidiasis   • Actinic keratosis   • Early onset Alzheimer's dementia without behavioral disturbance   • Clostridium difficile diarrhea- h/o   • Postcholecystectomy diarrhea   • Recurrent major depressive disorder, in full remission   • Irritable bowel syndrome with diarrhea   • Trigger thumb of right hand   • Dysfunction of right eustachian tube   • Acute conjunctivitis of left eye   • Bell's palsy   • Osteopenia of left forearm - dx'd 12/2021. Normal hip   • Vitamin D deficiency   • Anxiety   • Depressive disorder     Advance Care Planning   Advance Care Planning     Advance Directive is not on file.  ACP discussion was held with the patient during this visit. Patient does not have an advance directive, information provided.     Objective    Vitals:    05/02/23 1417   BP: 118/68   Pulse: 72   Temp: 96.9 °F (36.1 °C)   SpO2: 97%   Weight: 79.7 kg (175 lb 12.8 oz)   Height: 149.9 cm (59\")   PainSc: 0-No pain     Estimated body mass index is 35.51 kg/m² as calculated from the following:    Height as of this encounter: 149.9 cm (59\").    Weight as of this encounter: 79.7 kg (175 " lb 12.8 oz).    BMI is >= 30 and <35. (Class 1 Obesity). The following options were offered after discussion;: weight loss educational material (shared in after visit summary)      Does the patient have evidence of cognitive impairment? Yes    Lab Results   Component Value Date    TRIG 139 2023    HDL 76 (H) 2023    LDL 94 2023    VLDL 24 2023    HGBA1C 5.50 2023        HEALTH RISK ASSESSMENT    Smoking Status:  Social History     Tobacco Use   Smoking Status Never   • Passive exposure: Past   Smokeless Tobacco Never   Tobacco Comments    Tobacco reviewed with patient 2016     Alcohol Consumption:  Social History     Substance and Sexual Activity   Alcohol Use No     Fall Risk Screen:    CHRIS Fall Risk Assessment was completed, and patient is at LOW risk for falls.Assessment completed on:2023    Depression Screenin/2/2023     2:20 PM   PHQ-2/PHQ-9 Depression Screening   Little Interest or Pleasure in Doing Things 0-->not at all   Feeling Down, Depressed or Hopeless 0-->not at all   PHQ-9: Brief Depression Severity Measure Score 0       Health Habits and Functional and Cognitive Screenin/2/2023     2:19 PM   Functional & Cognitive Status   Do you have difficulty preparing food and eating? No   Do you have difficulty bathing yourself, getting dressed or grooming yourself? No   Do you have difficulty using the toilet? No   Do you have difficulty moving around from place to place? No   Do you have trouble with steps or getting out of a bed or a chair? No   Current Diet Well Balanced Diet   Dental Exam Up to date   Eye Exam Up to date   Exercise (times per week) 3 times per week   Current Exercises Include House Cleaning;Yard Work   Do you need help using the phone?  No   Are you deaf or do you have serious difficulty hearing?  No   Do you need help with transportation? No   Do you need help shopping? No   Do you need help preparing meals?  No   Do you need help  with housework?  No   Do you need help with laundry? No   Do you need help taking your medications? No   Do you need help managing money? No   Do you ever drive or ride in a car without wearing a seat belt? No   Have you felt unusual stress, anger or loneliness in the last month? No   Who do you live with? Spouse   If you need help, do you have trouble finding someone available to you? No   Have you been bothered in the last four weeks by sexual problems? No   Do you have difficulty concentrating, remembering or making decisions? Yes       Age-appropriate Screening Schedule:  Refer to the list below for future screening recommendations based on patient's age, sex and/or medical conditions. Orders for these recommended tests are listed in the plan section. The patient has been provided with a written plan.    Health Maintenance   Topic Date Due   • COVID-19 Vaccine (3 - Booster) 06/02/2021   • ZOSTER VACCINE (2 of 2) 06/27/2023   • INFLUENZA VACCINE  08/01/2023   • MAMMOGRAM  12/01/2023   • DXA SCAN  12/01/2023   • LIPID PANEL  04/25/2024   • ANNUAL WELLNESS VISIT  05/02/2024   • COLORECTAL CANCER SCREENING  06/20/2026   • TDAP/TD VACCINES (3 - Td or Tdap) 02/27/2033   • HEPATITIS C SCREENING  Completed   • Pneumococcal Vaccine 65+  Completed   • PAP SMEAR  Discontinued                  CMS Preventative Services Quick Reference  Risk Factors Identified During Encounter  Immunizations Discussed/Encouraged: Shingrix and COVID19   Mental health and dementia issues:    A prescription sent to [facility] for Shingrix.  She brings the Shingrix back to the office where 1st dose is administered after informed verbal consent,  2nd dose to be due in 2 to 6 months.     Declines COVID booster for now.     We will request a copy of eye exam completed earlier today from Dr. Stephanie Crocker's office.       The above risks/problems have been discussed with the patient.  Pertinent information has been shared with the patient  in the After Visit Summary.  An After Visit Summary and PPPS were made available to the patient.    Follow Up:   Next Medicare Wellness visit to be scheduled in 1 year.       Additional E&M Note during same encounter follows:  Patient has multiple medical problems which are significant and separately identifiable that require additional work above and beyond the Medicare Wellness Visit.      Chief Complaint  Follow-up (6 month), Medicare Wellness-subsequent, and Med Refill (Gabapentin)    Subjective        HPI  Andria Goldstein is also being seen today for 6-month follow up on hyperlipidemia, hypertension, cerebrovascular disease, early onset Alzheimer's dementia, impaired fasting glucose and vitamin B-12 deficiency among other issues.  She continues on Aricept and Namenda for Alzheimer's dementia and continues on Effexor XR for anxiety and depression.  She and her  both agree there has been a mild decline on dementia symptoms despite maximum therapy doses of Aricept and Namenda.     Patient has sun-damaged skin with a keratinized SK, and scattered AKs destroyed with cryotherapy today in the office today.      Patient continues on Neurontin for chronic low back pain with bilateral sciatica. She is currently taking Neurontin 400 mg in the morning, 400 mg mid-day, and 800 mg at night and is tolerating the Neurontin without significant side effects including excessive daytime sedation.     Weight is down 9 pounds in the past six months.  With the weight loss, labs continue to reveal stable IFG with no progression to diabetes.     DEXA 12/2021 revealed left forearm density met criteria for osteopenia, although, hip density remained normal density.  Six months ago, we added OTC vitamin D 1000 IU daily.  Vitamin D improved, but remains below goal at 29 with OTC vitamin D 1000 units daily.     Lab results are reviewed with the patient today.  CBC unremarkable.  Cholesterol at goal with Lipitor.  Normal renal  "and liver function.  Vitamin B-12 at goal with oral B-12.        Objective   Vital Signs:  /68   Pulse 72   Temp 96.9 °F (36.1 °C)   Ht 149.9 cm (59\")   Wt 79.7 kg (175 lb 12.8 oz)   SpO2 97%   BMI 35.51 kg/m²     Physical Exam  Vitals reviewed.   Constitutional:       General: She is not in acute distress.     Appearance: She is well-developed.      Comments: Pleasant female, obvious dementia.  No agitation.  Obese.  Accompanied by , Edgar.    HENT:      Head: Normocephalic and atraumatic.      Nose:      Right Sinus: No maxillary sinus tenderness or frontal sinus tenderness.      Left Sinus: No maxillary sinus tenderness or frontal sinus tenderness.      Mouth/Throat:      Mouth: No oral lesions.      Pharynx: Uvula midline.      Tonsils: No tonsillar exudate.   Eyes:      Conjunctiva/sclera: Conjunctivae normal.      Pupils: Pupils are equal, round, and reactive to light.   Neck:      Thyroid: No thyroid mass or thyromegaly.      Vascular: No carotid bruit or JVD.      Trachea: Trachea normal. No tracheal deviation.   Cardiovascular:      Rate and Rhythm: Normal rate and regular rhythm.  No extrasystoles are present.     Chest Wall: PMI is not displaced.      Heart sounds: Normal heart sounds. No murmur heard.  Pulmonary:      Effort: Pulmonary effort is normal. No accessory muscle usage or respiratory distress.      Breath sounds: Normal breath sounds. No decreased breath sounds, wheezing, rhonchi or rales.   Abdominal:      General: Bowel sounds are normal. There is no distension.      Palpations: Abdomen is soft.      Tenderness: There is no abdominal tenderness.      Comments: Obese abdomen.    Musculoskeletal:      Cervical back: Neck supple.   Lymphadenopathy:      Cervical: No cervical adenopathy.   Skin:     General: Skin is warm and dry.      Findings: No rash.      Nails: There is no clubbing.      Comments: Sun-damaged skin with a total of 9 skin lesions including 5 actinic " keratoses and 1 keratinized SK on right forearm and 2 actinic keratoses on left forearm and 1 actinic keratosis on dorsum of left hand destroyed with cryotherapy in the office today.   Neurological:      Mental Status: She is alert and oriented to person, place, and time.      Cranial Nerves: No cranial nerve deficit.      Coordination: Coordination normal.   Psychiatric:         Speech: Speech normal.         Behavior: Behavior normal.         Thought Content: Thought content normal.         Judgment: Judgment normal.                CMP        9/19/2022    13:15 4/25/2023    11:19   CMP   Glucose 104   111     BUN 15   20     Creatinine 0.87   0.91     EGFR 72.7   68.4     Sodium 139   141     Potassium 3.9   4.1     Chloride 103   104     Calcium 9.1   9.4     Total Protein 6.7   7.3     Albumin 4.10   4.4     Globulin 2.6   2.9     Total Bilirubin 0.5   0.7     Alkaline Phosphatase 112   114     AST (SGOT) 24   20     ALT (SGPT) 16   16     Albumin/Globulin Ratio 1.6   1.5     BUN/Creatinine Ratio 17.2   22.0     Anion Gap 5.0   6.0       CBC w/diff        9/19/2022    13:15 4/25/2023    11:19   CBC w/Diff   WBC 6.89   6.39     RBC 4.61   4.88     Hemoglobin 12.8   13.7     Hematocrit 40.8   43.1     MCV 88.5   88.3     MCH 27.8   28.1     MCHC 31.4   31.8     RDW 14.0   14.4     Platelets 206   191     Neutrophil Rel % 67.9   73.1     Lymphocyte Rel % 21.2   18.8     Monocyte Rel % 7.7   6.1     Eosinophil Rel % 2.8   1.7     Basophil Rel % 0.4   0.3       Lipid Panel        9/19/2022    13:15 4/25/2023    11:19   Lipid Panel   Total Cholesterol  194     Triglycerides  139     HDL Cholesterol  76     VLDL Cholesterol  24     LDL Cholesterol  76   94     LDL/HDL Ratio  1.19       TSH        9/19/2022    13:15   TSH   TSH 0.716       A1C Last 3 Results        9/19/2022    13:15 4/25/2023    11:19   HGBA1C Last 3 Results   Hemoglobin A1C 5.50   5.50         Cryotherapy, Skin Lesion    Date/Time: 5/2/2023 2:45  PM  Performed by: Christofer Mckeon MD  Authorized by: Christofer Mckeon MD   Preparation: Patient was prepped and draped in the usual sterile fashion.  Local anesthesia used: no    Anesthesia:  Local anesthesia used: no    Sedation:  Patient sedated: no    Patient tolerance: patient tolerated the procedure well with no immediate complications  Comments: After obtaining verbal informed consent, the patient receives cryotherapy to a total of 8 AK's on the bilateral upper extremities without difficulty or complication.  She also receives cryotherapy to 1 keratinized seborrheic keratosis on the right forearm without difficulty or complication.  The patient tolerated the procedure well.  Total procedure time: 11 minutes              Assessment and Plan   Diagnoses and all orders for this visit:    1. Medicare annual wellness visit, subsequent (Primary)    2. Need for shingles vaccine  -     Shingrix Vaccine    3. Degeneration of cervical intervertebral disc  -     gabapentin (NEURONTIN) 800 MG tablet; 1/2 tablet in the morning and 1/2 tablet at noon and one tablet at bedtime  Dispense: 60 tablet; Refill: 5    4. Disorder of lumbar spine    5. Essential hypertension  -     Comprehensive Metabolic Panel; Future    6. Mixed hyperlipidemia  -     Comprehensive Metabolic Panel; Future  -     LDL Cholesterol, Direct; Future  -     TSH; Future    7. Gastroesophageal reflux disease without esophagitis  -     CBC Auto Differential; Future    8. Megaloblastic anemia due to vitamin B12 deficiency  -     CBC Auto Differential; Future  -     Vitamin B12; Future    9. Generalized anxiety disorder    10. Early onset Alzheimer's dementia without behavioral disturbance    11. AK (actinic keratosis)  -     Cryotherapy, Skin Lesion    12. SK (seborrheic keratosis)  -     Cryotherapy, Skin Lesion    13. Impaired fasting glucose  -     Hemoglobin A1c; Future    14. Vitamin D deficiency  -     Vitamin D,25-Hydroxy; Future    15. Osteopenia of left  forearm  -     Vitamin D,25-Hydroxy; Future    Other orders  -     Zoster Vac Recomb Adjuvanted 50 MCG/0.5ML reconstituted suspension; Inject 0.5 mL into the appropriate muscle as directed by prescriber Every 2 (Two) Months.  Dispense: 1 each; Refill: 1            I spent 32 minutes caring for Andria on this date of service. This time includes time spent by me in the following activities:preparing for the visit, reviewing tests, performing a medically appropriate examination and/or evaluation , counseling and educating the patient/family/caregiver, ordering medications, tests, or procedures, documenting information in the medical record and care coordination with her .  This does not include time spent on her cryotherapy procedure today.  This does not include time spent on her Medicare AWV today.    Vitamin D deficiency is not at goal.  Increase vitamin D to 2000 units daily.  She will be due repeat DEXA 12/2023 to reassess osteopenia    After informed verbal consent, cryotherapy is used to destroy a total of 9 skin lesions including 5 actinic keratoses and 1 keratinized SK on right forearm and 2 actinic keratoses on left forearm and 1 actinic keratosis on dorsum of left hand.  Recommended safe sun exposure measures including applying sunscreen prior to sun exposure and wearing protective clothing with sun exposure to  block UV light with sun exposure.     Refill is sent for Neurontin 800 mg to take 1/2 tablet in a.m., 1/2 tablet midday, and 1 tablet daily at bedtime.  Patient understands the risks associated with this controlled medication, including tolerance and addiction. She also agrees to only obtain this medication from me, and not from a another provider, unless that provider is covering for me in my absence. She also agrees to be compliant in dosing, and not self adjust the dose of medication. A signed controlled substance agreement is on file, and she has received a controlled substance education  sheet at this a previous visit. She has also signed a consent for treatment with a controlled substance as per [facility] policy. KARO was obtained    Continue the current doses of Aricept and Namenda for dementia and Effexor XR for HALI/depression.  Patient's  has noticed mild progressing dementia symptoms with maximum medical therapy.      Continue Lipitor 10 mg q.d.   Cholesterol at goal.  Continue current BP medications and monitoring.  Pursue additional weight loss.       Continue Prilosec for GERD, stable.      Continue the oral B-12, at goal.     Return in 6 months for routine follow up with fasting labs one week prior or sooner if needed.      Scribed for Dr. Mckeon by Cesilia Gutierrez [facility].     Follow Up   Return in about 6 months (around 11/2/2023) for Follow up in six months with labs one week prior..  Patient was given instructions and counseling regarding her condition or for health maintenance advice. Please see specific information pulled into the AVS if appropriate.

## 2023-05-03 PROBLEM — F33.42 RECURRENT MAJOR DEPRESSIVE DISORDER, IN FULL REMISSION: Chronic | Status: ACTIVE | Noted: 2017-04-26

## 2023-05-03 RX ORDER — MELATONIN
2000 DAILY
COMMUNITY

## 2023-05-16 RX ORDER — TIZANIDINE 4 MG/1
4 TABLET ORAL NIGHTLY PRN
Qty: 30 TABLET | Refills: 5 | Status: SHIPPED | OUTPATIENT
Start: 2023-05-16

## 2023-05-16 RX ORDER — LISINOPRIL AND HYDROCHLOROTHIAZIDE 20; 12.5 MG/1; MG/1
1 TABLET ORAL EVERY MORNING
Qty: 30 TABLET | Refills: 11 | Status: SHIPPED | OUTPATIENT
Start: 2023-05-16

## 2023-06-16 RX ORDER — MONTELUKAST SODIUM 10 MG/1
10 TABLET ORAL NIGHTLY
Qty: 30 TABLET | Refills: 11 | Status: SHIPPED | OUTPATIENT
Start: 2023-06-16

## 2023-08-16 RX ORDER — ATORVASTATIN CALCIUM 20 MG/1
20 TABLET, FILM COATED ORAL DAILY
Qty: 30 TABLET | Refills: 11 | Status: SHIPPED | OUTPATIENT
Start: 2023-08-16

## 2023-09-06 DIAGNOSIS — G30.0 EARLY ONSET ALZHEIMER'S DEMENTIA WITHOUT BEHAVIORAL DISTURBANCE: ICD-10-CM

## 2023-09-06 DIAGNOSIS — F02.80 EARLY ONSET ALZHEIMER'S DEMENTIA WITHOUT BEHAVIORAL DISTURBANCE: ICD-10-CM

## 2023-09-06 RX ORDER — DONEPEZIL HYDROCHLORIDE 10 MG/1
10 TABLET, FILM COATED ORAL NIGHTLY
Qty: 30 TABLET | Refills: 11 | Status: SHIPPED | OUTPATIENT
Start: 2023-09-06

## 2023-09-20 DIAGNOSIS — F02.80 EARLY ONSET ALZHEIMER'S DEMENTIA WITHOUT BEHAVIORAL DISTURBANCE: ICD-10-CM

## 2023-09-20 DIAGNOSIS — G30.0 EARLY ONSET ALZHEIMER'S DEMENTIA WITHOUT BEHAVIORAL DISTURBANCE: ICD-10-CM

## 2023-09-20 RX ORDER — MEMANTINE HYDROCHLORIDE 10 MG/1
TABLET ORAL
Qty: 60 TABLET | Refills: 11 | Status: SHIPPED | OUTPATIENT
Start: 2023-09-20

## 2023-09-22 ENCOUNTER — TELEPHONE (OUTPATIENT)
Dept: FAMILY MEDICINE CLINIC | Facility: CLINIC | Age: 68
End: 2023-09-22
Payer: MEDICARE

## 2023-09-22 RX ORDER — OMEPRAZOLE 40 MG/1
40 CAPSULE, DELAYED RELEASE ORAL EVERY MORNING
Qty: 90 CAPSULE | Refills: 3 | Status: SHIPPED | OUTPATIENT
Start: 2023-09-22

## 2023-09-22 NOTE — TELEPHONE ENCOUNTER
Needs refill on omeprazole (priLOSEC) 40 MG, to [facility].

## 2024-11-14 NOTE — PROGRESS NOTES
"Subjective     History of Present Illness     Andria Goldstein is a 63 y.o. female who presents for follow-up of chronic issues including chronic low back pain with bilateral sciatica, for which she continues on Mobic and Neurontin for pain control.  She denies significant side effects including excessive daytime sedation causing impairment in operating a motor vehicle.       She is requesting a refill on her Flonase to help with seasonal allergies and nasal congestion.  I recommended using a humidifier during the winter heating months.       Her blood pressure is well controlled today with lisinopril HCT and Coreg.    She is following with Erick Collins, orthopedist in [place] for bilateral knee pain.  She is currently in physical therapy.      Blood pressure and heart rate at goal.  Weight is down 3 pounds in the past three months.        Review of Systems   Constitutional: Negative for chills, fatigue and fever.   HENT: Negative for congestion, ear pain, postnasal drip, sinus pressure and sore throat.    Respiratory: Negative for cough, shortness of breath and wheezing.    Cardiovascular: Negative for chest pain, palpitations and leg swelling.   Gastrointestinal: Negative for abdominal pain, blood in stool, constipation, diarrhea, nausea and vomiting.   Endocrine: Negative for cold intolerance, heat intolerance, polydipsia and polyuria.   Genitourinary: Negative for dysuria, frequency, hematuria and urgency.   Musculoskeletal: Positive for back pain.        Knee pain   Skin: Negative for rash.   Neurological: Negative for syncope and weakness.        Objective     Vitals:    12/18/18 0920   BP: 124/74   BP Location: Right arm   Patient Position: Sitting   Cuff Size: Adult   Pulse: 66   Temp: 98 °F (36.7 °C)   TempSrc: Oral   Weight: 82.6 kg (182 lb)   Height: 152.4 cm (60\")     Physical Exam   Constitutional: She is oriented to person, place, and time. She appears well-developed and well-nourished. No " distress.   Obese female.    HENT:   Head: Normocephalic and atraumatic.   Nose: Nose normal.   Mouth/Throat: Oropharynx is clear and moist. No oropharyngeal exudate.   Eyes: EOM are normal. Pupils are equal, round, and reactive to light.   Neck: Neck supple. No JVD present. No thyromegaly present.   Cardiovascular: Normal rate, regular rhythm and normal heart sounds.   Pulmonary/Chest: Effort normal and breath sounds normal. No accessory muscle usage. No respiratory distress. She has no wheezes. She has no rales.   Abdominal: Soft. Bowel sounds are normal. She exhibits no distension. There is no tenderness.   Obese/overweight abdomen.     Musculoskeletal: She exhibits no edema.   Lymphadenopathy:     She has no cervical adenopathy.   Neurological: She is alert and oriented to person, place, and time. No cranial nerve deficit.   Psychiatric: She has a normal mood and affect. Her speech is normal and behavior is normal. Judgment and thought content normal.       Future Appointments   Date Time Provider Department Center   2/18/2019 11:15 AM Christofer Mckeon MD [facility] None       Assessment/Plan      Refill is sent for Neurontin 800 mg to take 1/2 tablet in a.m., 1/2 tablet midday, and 1 tablet daily at bedtime.  Continue the Mobic 15 mg daily as needed.  Patient understands the risks associated with this controlled medication, including tolerance and addiction.  She also agrees to only obtain this medication from me, and not from a another provider, unless that provider is covering for me in my absence.  She also agrees to be compliant in dosing, and not self adjust the dose of medication.  A signed controlled substance agreement is on file, and she has received a controlled substance education sheet at this a previous visit.  She has also signed a consent for treatment with a controlled substance as per [facility] policy. KARO was obtained.    Refill is sent for Flonase nasal spray to use one spray each  nostril b.i.d.     Continue the current blood pressure medications.  Pursue sodium restriction and weight loss.    Keep follow-up appointments with her orthopedist and continue current medications.    Return in February for routine follow up with fasting labs one week prior.     Scribed for Dr. Mckeon by Cesilia Gutierrez [facility].     Diagnoses and all orders for this visit:    Chronic bilateral low back pain with bilateral sciatica    Essential hypertension    Class 2 severe obesity due to excess calories with serious comorbidity and body mass index (BMI) of 35.0 to 35.9 in adult (CMS/[facility])    Seasonal allergic rhinitis due to pollen    Primary osteoarthritis of both knees    Other orders  -     gabapentin (NEURONTIN) 800 MG tablet; TAKE 1/2 TABLET IN THE MORNING AND AT NOON AND 1 BY MOUTH EVERY NIGHT AT BEDTIME FOR CHRONIC BACK PAIN  -     fluticasone (FLONASE) 50 MCG/ACT nasal spray; 1 spray into the nostril(s) as directed by provider 2 (Two) Times a Day. Administer 1 spray in each nostril twice daily.        No visits with results within 3 Week(s) from this visit.   Latest known visit with results is:   Lab on 08/10/2018   Component Date Value Ref Range Status   • Glucose 08/10/2018 108* 74 - 99 mg/dL Final   • BUN 08/10/2018 15  7 - 17 mg/dL Final   • Creatinine 08/10/2018 0.90  0.52 - 1.04 mg/dL Final   • Sodium 08/10/2018 145  137 - 145 mmol/L Final   • Potassium 08/10/2018 4.4  3.4 - 5.0 mmol/L Final   • Chloride 08/10/2018 106  98 - 107 mmol/L Final   • CO2 08/10/2018 31.0* 22.0 - 30.0 mmol/L Final   • Calcium 08/10/2018 9.8  8.4 - 10.2 mg/dL Final   • Total Protein 08/10/2018 7.0  6.3 - 8.2 g/dL Final   • Albumin 08/10/2018 4.20  3.50 - 5.00 g/dL Final   • ALT (SGPT) 08/10/2018 19  <=35 U/L Final   • AST (SGOT) 08/10/2018 23  14 - 36 U/L Final   • Alkaline Phosphatase 08/10/2018 92  38 - 126 U/L Final   • Total Bilirubin 08/10/2018 0.6  0.2 - 1.3 mg/dL Final   • eGFR Non African Amer 08/10/2018 63  45 - 104  mL/min/1.73 Final   • Globulin 08/10/2018 2.8  2.3 - 3.5 gm/dL Final   • A/G Ratio 08/10/2018 1.5  1.1 - 1.8 g/dL Final   • BUN/Creatinine Ratio 08/10/2018 16.7  7.0 - 25.0 Final   • Anion Gap 08/10/2018 8.0  5.0 - 15.0 mmol/L Final   • WBC 08/10/2018 5.81  3.20 - 9.80 10*3/mm3 Final   • RBC 08/10/2018 4.60  3.77 - 5.16 10*6/mm3 Final   • Hemoglobin 08/10/2018 13.4  12.0 - 15.5 g/dL Final   • Hematocrit 08/10/2018 41.2  35.0 - 45.0 % Final   • MCV 08/10/2018 89.6  80.0 - 98.0 fL Final   • MCH 08/10/2018 29.1  26.5 - 34.0 pg Final   • MCHC 08/10/2018 32.5  31.4 - 36.0 g/dL Final   • RDW 08/10/2018 13.3  11.5 - 14.5 % Final   • RDW-SD 08/10/2018 42.4  36.4 - 46.3 fl Final   • MPV 08/10/2018 12.2* 8.0 - 12.0 fL Final   • Platelets 08/10/2018 175  150 - 450 10*3/mm3 Final   • Neutrophil % 08/10/2018 65.6  37.0 - 80.0 % Final   • Lymphocyte % 08/10/2018 22.9  10.0 - 50.0 % Final   • Monocyte % 08/10/2018 8.1  0.0 - 12.0 % Final   • Eosinophil % 08/10/2018 3.1  0.0 - 7.0 % Final   • Basophil % 08/10/2018 0.3  0.0 - 2.0 % Final   • Neutrophils, Absolute 08/10/2018 3.81  2.00 - 8.60 10*3/mm3 Final   • Lymphocytes, Absolute 08/10/2018 1.33  0.60 - 4.20 10*3/mm3 Final   • Monocytes, Absolute 08/10/2018 0.47  0.00 - 0.90 10*3/mm3 Final   • Eosinophils, Absolute 08/10/2018 0.18  0.00 - 0.70 10*3/mm3 Final   • Basophils, Absolute 08/10/2018 0.02  0.00 - 0.20 10*3/mm3 Final   • Hemoglobin A1C 08/10/2018 5.7* 4 - 5.6 % Final   • Total Cholesterol 08/10/2018 177  150 - 200 mg/dL Final   • Triglycerides 08/10/2018 142  <=150 mg/dL Final   • HDL Cholesterol 08/10/2018 61* 40 - 59 mg/dL Final   • LDL Cholesterol  08/10/2018 88  <=100 mg/dL Final   • VLDL Cholesterol 08/10/2018 28.4  mg/dL Final   • LDL/HDL Ratio 08/10/2018 1.44  0.00 - 3.22 Final   • Vitamin B-12 08/10/2018 621  239 - 931 pg/mL Final   • TSH 08/10/2018 0.950  0.460 - 4.680 mIU/mL Final   ]     14-Nov-2024